# Patient Record
Sex: MALE | Race: WHITE | Employment: UNEMPLOYED | URBAN - METROPOLITAN AREA
[De-identification: names, ages, dates, MRNs, and addresses within clinical notes are randomized per-mention and may not be internally consistent; named-entity substitution may affect disease eponyms.]

---

## 2019-04-30 ENCOUNTER — HOSPITAL ENCOUNTER (OUTPATIENT)
Dept: LAB | Age: 54
Discharge: HOME | End: 2019-04-30
Payer: MEDICAID

## 2019-04-30 LAB — GLUCOSE SERPL-MCNC: 491 MG/DL (ref 65–100)

## 2019-04-30 PROCEDURE — 36415 COLL VENOUS BLD VENIPUNCTURE: CPT

## 2019-04-30 PROCEDURE — 82947 ASSAY GLUCOSE BLOOD QUANT: CPT

## 2021-06-10 ENCOUNTER — APPOINTMENT (OUTPATIENT)
Dept: GENERAL RADIOLOGY | Age: 56
DRG: 143 | End: 2021-06-10
Attending: EMERGENCY MEDICINE
Payer: COMMERCIAL

## 2021-06-10 ENCOUNTER — APPOINTMENT (OUTPATIENT)
Dept: CT IMAGING | Age: 56
DRG: 143 | End: 2021-06-10
Attending: EMERGENCY MEDICINE
Payer: COMMERCIAL

## 2021-06-10 ENCOUNTER — HOSPITAL ENCOUNTER (INPATIENT)
Age: 56
LOS: 8 days | Discharge: LEFT BEFORE DISCHARGE | DRG: 143 | End: 2021-06-18
Attending: EMERGENCY MEDICINE | Admitting: HOSPITALIST
Payer: COMMERCIAL

## 2021-06-10 DIAGNOSIS — I10 ESSENTIAL HYPERTENSION: Chronic | ICD-10-CM

## 2021-06-10 DIAGNOSIS — I47.20 V TACH: Chronic | ICD-10-CM

## 2021-06-10 DIAGNOSIS — J98.2 PNEUMOMEDIASTINUM (HCC): Primary | ICD-10-CM

## 2021-06-10 DIAGNOSIS — F19.10 SUBSTANCE ABUSE (HCC): Chronic | ICD-10-CM

## 2021-06-10 DIAGNOSIS — S11.021A: ICD-10-CM

## 2021-06-10 DIAGNOSIS — I50.22 SYSTOLIC CHF, CHRONIC (HCC): ICD-10-CM

## 2021-06-10 DIAGNOSIS — J44.1 COPD EXACERBATION (HCC): ICD-10-CM

## 2021-06-10 DIAGNOSIS — E11.65 UNCONTROLLED TYPE 2 DIABETES MELLITUS WITH HYPERGLYCEMIA (HCC): Chronic | ICD-10-CM

## 2021-06-10 DIAGNOSIS — Z72.0 TOBACCO ABUSE: Chronic | ICD-10-CM

## 2021-06-10 DIAGNOSIS — R73.9 HYPERGLYCEMIA: ICD-10-CM

## 2021-06-10 DIAGNOSIS — S11.021D: ICD-10-CM

## 2021-06-10 DIAGNOSIS — E87.1 ACUTE HYPONATREMIA: ICD-10-CM

## 2021-06-10 DIAGNOSIS — F15.10 METHAMPHETAMINE USE (HCC): Chronic | ICD-10-CM

## 2021-06-10 DIAGNOSIS — R09.02 HYPOXIA: ICD-10-CM

## 2021-06-10 PROBLEM — E86.0 DEHYDRATION: Status: ACTIVE | Noted: 2021-06-10

## 2021-06-10 LAB
ALBUMIN SERPL-MCNC: 2.2 G/DL (ref 3.5–5)
ALBUMIN/GLOB SERPL: 0.3 {RATIO} (ref 1.2–3.5)
ALP SERPL-CCNC: 184 U/L (ref 50–136)
ALT SERPL-CCNC: 23 U/L (ref 12–65)
ANION GAP SERPL CALC-SCNC: 9 MMOL/L (ref 7–16)
AST SERPL-CCNC: 120 U/L (ref 15–37)
ATRIAL RATE: 89 BPM
ATRIAL RATE: 96 BPM
BASOPHILS # BLD: 0.1 K/UL (ref 0–0.2)
BASOPHILS NFR BLD: 1 % (ref 0–2)
BILIRUB SERPL-MCNC: 1.3 MG/DL (ref 0.2–1.1)
BUN SERPL-MCNC: 31 MG/DL (ref 6–23)
CALCIUM SERPL-MCNC: 9.3 MG/DL (ref 8.3–10.4)
CALCULATED P AXIS, ECG09: 82 DEGREES
CALCULATED P AXIS, ECG09: 86 DEGREES
CALCULATED R AXIS, ECG10: -54 DEGREES
CALCULATED R AXIS, ECG10: -64 DEGREES
CALCULATED T AXIS, ECG11: 101 DEGREES
CALCULATED T AXIS, ECG11: 105 DEGREES
CHLORIDE SERPL-SCNC: 80 MMOL/L (ref 98–107)
CO2 SERPL-SCNC: 32 MMOL/L (ref 21–32)
CREAT SERPL-MCNC: 0.97 MG/DL (ref 0.8–1.5)
DIAGNOSIS, 93000: NORMAL
DIAGNOSIS, 93000: NORMAL
DIFFERENTIAL METHOD BLD: ABNORMAL
EOSINOPHIL # BLD: 0.1 K/UL (ref 0–0.8)
EOSINOPHIL NFR BLD: 0 % (ref 0.5–7.8)
ERYTHROCYTE [DISTWIDTH] IN BLOOD BY AUTOMATED COUNT: 13.9 % (ref 11.9–14.6)
GLOBULIN SER CALC-MCNC: 6.8 G/DL (ref 2.3–3.5)
GLUCOSE SERPL-MCNC: 464 MG/DL (ref 65–100)
HCT VFR BLD AUTO: 54.4 % (ref 41.1–50.3)
HGB BLD-MCNC: 18.4 G/DL (ref 13.6–17.2)
IMM GRANULOCYTES # BLD AUTO: 0 K/UL (ref 0–0.5)
IMM GRANULOCYTES NFR BLD AUTO: 0 % (ref 0–5)
LACTATE SERPL-SCNC: 2.6 MMOL/L (ref 0.4–2)
LYMPHOCYTES # BLD: 1.7 K/UL (ref 0.5–4.6)
LYMPHOCYTES NFR BLD: 15 % (ref 13–44)
MAGNESIUM SERPL-MCNC: 2.2 MG/DL (ref 1.8–2.4)
MCH RBC QN AUTO: 26.7 PG (ref 26.1–32.9)
MCHC RBC AUTO-ENTMCNC: 33.8 G/DL (ref 31.4–35)
MCV RBC AUTO: 79 FL (ref 79.6–97.8)
MONOCYTES # BLD: 0.9 K/UL (ref 0.1–1.3)
MONOCYTES NFR BLD: 8 % (ref 4–12)
NEUTS SEG # BLD: 8.9 K/UL (ref 1.7–8.2)
NEUTS SEG NFR BLD: 76 % (ref 43–78)
NRBC # BLD: 0 K/UL (ref 0–0.2)
P-R INTERVAL, ECG05: 196 MS
P-R INTERVAL, ECG05: 198 MS
PLATELET # BLD AUTO: 384 K/UL (ref 150–450)
PMV BLD AUTO: 10.8 FL (ref 9.4–12.3)
POTASSIUM SERPL-SCNC: 4.5 MMOL/L (ref 3.5–5.1)
POTASSIUM SERPL-SCNC: 7.7 MMOL/L (ref 3.5–5.1)
PROT SERPL-MCNC: 9 G/DL (ref 6.3–8.2)
Q-T INTERVAL, ECG07: 410 MS
Q-T INTERVAL, ECG07: 450 MS
QRS DURATION, ECG06: 150 MS
QRS DURATION, ECG06: 152 MS
QTC CALCULATION (BEZET), ECG08: 517 MS
QTC CALCULATION (BEZET), ECG08: 547 MS
RBC # BLD AUTO: 6.89 M/UL (ref 4.23–5.6)
SODIUM SERPL-SCNC: 121 MMOL/L (ref 136–145)
TROPONIN-HIGH SENSITIVITY: 77.5 PG/ML (ref 0–14)
TROPONIN-HIGH SENSITIVITY: 84.7 PG/ML (ref 0–14)
VENTRICULAR RATE, ECG03: 89 BPM
VENTRICULAR RATE, ECG03: 96 BPM
WBC # BLD AUTO: 11.6 K/UL (ref 4.3–11.1)

## 2021-06-10 PROCEDURE — 74011250636 HC RX REV CODE- 250/636: Performed by: EMERGENCY MEDICINE

## 2021-06-10 PROCEDURE — 81003 URINALYSIS AUTO W/O SCOPE: CPT

## 2021-06-10 PROCEDURE — 71260 CT THORAX DX C+: CPT

## 2021-06-10 PROCEDURE — 74011000258 HC RX REV CODE- 258: Performed by: EMERGENCY MEDICINE

## 2021-06-10 PROCEDURE — 93005 ELECTROCARDIOGRAM TRACING: CPT | Performed by: EMERGENCY MEDICINE

## 2021-06-10 PROCEDURE — 87040 BLOOD CULTURE FOR BACTERIA: CPT

## 2021-06-10 PROCEDURE — 85025 COMPLETE CBC W/AUTO DIFF WBC: CPT

## 2021-06-10 PROCEDURE — 99285 EMERGENCY DEPT VISIT HI MDM: CPT

## 2021-06-10 PROCEDURE — 84132 ASSAY OF SERUM POTASSIUM: CPT

## 2021-06-10 PROCEDURE — 96365 THER/PROPH/DIAG IV INF INIT: CPT

## 2021-06-10 PROCEDURE — 71045 X-RAY EXAM CHEST 1 VIEW: CPT

## 2021-06-10 PROCEDURE — 80053 COMPREHEN METABOLIC PANEL: CPT

## 2021-06-10 PROCEDURE — 65660000000 HC RM CCU STEPDOWN

## 2021-06-10 PROCEDURE — 96361 HYDRATE IV INFUSION ADD-ON: CPT

## 2021-06-10 PROCEDURE — 74011000636 HC RX REV CODE- 636: Performed by: EMERGENCY MEDICINE

## 2021-06-10 PROCEDURE — 83605 ASSAY OF LACTIC ACID: CPT

## 2021-06-10 PROCEDURE — 84484 ASSAY OF TROPONIN QUANT: CPT

## 2021-06-10 PROCEDURE — 83735 ASSAY OF MAGNESIUM: CPT

## 2021-06-10 RX ORDER — SODIUM CHLORIDE 0.9 % (FLUSH) 0.9 %
5-10 SYRINGE (ML) INJECTION AS NEEDED
Status: DISCONTINUED | OUTPATIENT
Start: 2021-06-10 | End: 2021-06-18 | Stop reason: HOSPADM

## 2021-06-10 RX ORDER — SODIUM CHLORIDE 0.9 % (FLUSH) 0.9 %
10 SYRINGE (ML) INJECTION
Status: COMPLETED | OUTPATIENT
Start: 2021-06-10 | End: 2021-06-10

## 2021-06-10 RX ORDER — LISINOPRIL 10 MG/1
10 TABLET ORAL DAILY
COMMUNITY

## 2021-06-10 RX ORDER — INSULIN GLARGINE 100 [IU]/ML
25 INJECTION, SOLUTION SUBCUTANEOUS
COMMUNITY

## 2021-06-10 RX ORDER — METOPROLOL TARTRATE 25 MG/1
25 TABLET, FILM COATED ORAL 2 TIMES DAILY
COMMUNITY
End: 2021-06-18

## 2021-06-10 RX ORDER — ASPIRIN 81 MG/1
81 TABLET ORAL DAILY
COMMUNITY

## 2021-06-10 RX ORDER — SODIUM CHLORIDE 0.9 % (FLUSH) 0.9 %
5-10 SYRINGE (ML) INJECTION EVERY 8 HOURS
Status: DISCONTINUED | OUTPATIENT
Start: 2021-06-10 | End: 2021-06-18 | Stop reason: HOSPADM

## 2021-06-10 RX ADMIN — Medication 10 ML: at 20:53

## 2021-06-10 RX ADMIN — PIPERACILLIN SODIUM AND TAZOBACTAM SODIUM 4.5 G: 4; .5 INJECTION, POWDER, LYOPHILIZED, FOR SOLUTION INTRAVENOUS at 21:32

## 2021-06-10 RX ADMIN — SODIUM CHLORIDE 100 ML: 900 INJECTION, SOLUTION INTRAVENOUS at 20:53

## 2021-06-10 RX ADMIN — IOPAMIDOL 100 ML: 755 INJECTION, SOLUTION INTRAVENOUS at 20:53

## 2021-06-10 RX ADMIN — SODIUM CHLORIDE 1000 ML: 900 INJECTION, SOLUTION INTRAVENOUS at 17:39

## 2021-06-10 NOTE — ED TRIAGE NOTES
Arrives via EMS from home. Complains of SOB beginning 2 days ago. States productive cough. 88% on room air.  Pt placed on 2L supp O2

## 2021-06-10 NOTE — ED PROVIDER NOTES
48-year-old white male history of hypertension and diabetes and heavy tobacco use presents with 3 days of cough and shortness of breath. Cough is productive of a yellow nonbloody sputum. Has had subjective fever. No chest pain. No leg pain or swelling. The history is provided by the patient. Shortness of Breath  Associated symptoms include a fever and cough. Pertinent negatives include no headaches, no neck pain, no chest pain, no vomiting, no abdominal pain and no rash. No past medical history on file. No past surgical history on file. No family history on file. Social History     Socioeconomic History    Marital status: SINGLE     Spouse name: Not on file    Number of children: Not on file    Years of education: Not on file    Highest education level: Not on file   Occupational History    Not on file   Tobacco Use    Smoking status: Not on file   Substance and Sexual Activity    Alcohol use: Not on file    Drug use: Not on file    Sexual activity: Not on file   Other Topics Concern    Not on file   Social History Narrative    Not on file     Social Determinants of Health     Financial Resource Strain:     Difficulty of Paying Living Expenses:    Food Insecurity:     Worried About Running Out of Food in the Last Year:     [address] in the Last Year:    Transportation Needs:     Lack of Transportation (Medical):      Lack of Transportation (Non-Medical):    Physical Activity:     Days of Exercise per Week:     Minutes of Exercise per Session:    Stress:     Feeling of Stress :    Social Connections:     Frequency of Communication with Friends and Family:     Frequency of Social Gatherings with Friends and Family:     Attends Church Services:     Active Member of Clubs or Organizations:     Attends Club or Organization Meetings:     Marital Status:    Intimate Partner Violence:     Fear of Current or Ex-Partner:     Emotionally Abused:     Physically Abused:     Sexually Abused: ALLERGIES: Patient has no known allergies. Review of Systems   Constitutional: Positive for chills and fever. HENT: Positive for congestion. Respiratory: Positive for cough and shortness of breath. Cardiovascular: Negative for chest pain. Gastrointestinal: Negative for abdominal pain, nausea and vomiting. Genitourinary: Negative for dysuria. Musculoskeletal: Negative for back pain and neck pain. Skin: Negative for rash. Neurological: Negative for headaches. Vitals:    06/10/21 1715 06/10/21 1720   BP: 128/80    Pulse: 96    Resp: 24    Temp: 97.9 °F (36.6 °C)    SpO2: (!) 88% 97%            Physical Exam  Vitals and nursing note reviewed. Constitutional:       General: He is not in acute distress. Appearance: Normal appearance. He is not toxic-appearing. HENT:      Head: Normocephalic and atraumatic. Nose: Nose normal.      Mouth/Throat:      Mouth: Mucous membranes are moist.      Pharynx: Oropharynx is clear. Eyes:      Conjunctiva/sclera: Conjunctivae normal.      Pupils: Pupils are equal, round, and reactive to light. Cardiovascular:      Rate and Rhythm: Normal rate and regular rhythm. Pulmonary:      Comments: Appears dyspneic however lungs are clear. Abdominal:      General: There is no distension. Palpations: Abdomen is soft. Tenderness: There is no abdominal tenderness. Musculoskeletal:         General: No swelling. Normal range of motion. Cervical back: Normal range of motion. Skin:     General: Skin is warm and dry. Neurological:      Mental Status: He is alert and oriented to person, place, and time. Psychiatric:         Mood and Affect: Mood normal.         Behavior: Behavior normal.          MDM  Number of Diagnoses or Management Options  Diagnosis management comments: EKG shows sinus rhythm with occasional PVC, nonspecific interventricular block some ST elevation in V3 V4.   Chest x-ray no acute abnormality. Blood work shows leukocytosis 11.6, hyperglycemia 464 without DKA, initial potassium 7.7 with hemolysis. Repeated 4.5. Troponin 84 on repeat 77. Due to patient's hypoxia on arrival he underwent chest CT which shows no evidence of PE. He does have a pneumomediastinum believed to be due to a rupture of the posterior tracheal wall. No other acute abnormality. Bilateral lower lobe bronchi obstructed by mucus impaction.        Amount and/or Complexity of Data Reviewed  Clinical lab tests: ordered and reviewed  Tests in the radiology section of CPT®: ordered and reviewed  Tests in the medicine section of CPT®: ordered and reviewed  Discuss the patient with other providers: yes  Independent visualization of images, tracings, or specimens: yes    Risk of Complications, Morbidity, and/or Mortality  Presenting problems: high  Diagnostic procedures: high  Management options: high           Procedures

## 2021-06-11 PROBLEM — Z72.0 TOBACCO ABUSE: Status: ACTIVE | Noted: 2018-03-31

## 2021-06-11 PROBLEM — J38.00 VOCAL CORD PARALYSIS: Status: ACTIVE | Noted: 2021-03-17

## 2021-06-11 PROBLEM — I10 ESSENTIAL HYPERTENSION: Status: ACTIVE | Noted: 2018-04-09

## 2021-06-11 PROBLEM — J44.1 COPD EXACERBATION (HCC): Status: ACTIVE | Noted: 2021-06-11

## 2021-06-11 PROBLEM — F15.10 METHAMPHETAMINE USE (HCC): Status: ACTIVE | Noted: 2018-03-31

## 2021-06-11 PROBLEM — E11.9 TYPE 2 DIABETES MELLITUS WITHOUT COMPLICATION, WITHOUT LONG-TERM CURRENT USE OF INSULIN (HCC): Status: ACTIVE | Noted: 2018-03-31

## 2021-06-11 LAB
AMPHET UR QL SCN: POSITIVE
ANION GAP SERPL CALC-SCNC: 10 MMOL/L (ref 7–16)
ANION GAP SERPL CALC-SCNC: 7 MMOL/L (ref 7–16)
BARBITURATES UR QL SCN: NEGATIVE
BENZODIAZ UR QL: NEGATIVE
BUN SERPL-MCNC: 24 MG/DL (ref 6–23)
BUN SERPL-MCNC: 29 MG/DL (ref 6–23)
CALCIUM SERPL-MCNC: 8.4 MG/DL (ref 8.3–10.4)
CALCIUM SERPL-MCNC: 8.5 MG/DL (ref 8.3–10.4)
CANNABINOIDS UR QL SCN: NEGATIVE
CHLORIDE SERPL-SCNC: 90 MMOL/L (ref 98–107)
CHLORIDE SERPL-SCNC: 93 MMOL/L (ref 98–107)
CO2 SERPL-SCNC: 28 MMOL/L (ref 21–32)
CO2 SERPL-SCNC: 32 MMOL/L (ref 21–32)
COCAINE UR QL SCN: NEGATIVE
CREAT SERPL-MCNC: 0.61 MG/DL (ref 0.8–1.5)
CREAT SERPL-MCNC: 0.73 MG/DL (ref 0.8–1.5)
ERYTHROCYTE [DISTWIDTH] IN BLOOD BY AUTOMATED COUNT: 12.9 % (ref 11.9–14.6)
GLUCOSE BLD STRIP.AUTO-MCNC: 267 MG/DL (ref 65–100)
GLUCOSE BLD STRIP.AUTO-MCNC: 340 MG/DL (ref 65–100)
GLUCOSE BLD STRIP.AUTO-MCNC: 345 MG/DL (ref 65–100)
GLUCOSE BLD STRIP.AUTO-MCNC: 392 MG/DL (ref 65–100)
GLUCOSE BLD STRIP.AUTO-MCNC: 514 MG/DL (ref 65–100)
GLUCOSE SERPL-MCNC: 348 MG/DL (ref 65–100)
GLUCOSE SERPL-MCNC: 393 MG/DL (ref 65–100)
HCT VFR BLD AUTO: 46.8 % (ref 41.1–50.3)
HGB BLD-MCNC: 15.8 G/DL (ref 13.6–17.2)
LACTATE SERPL-SCNC: 2 MMOL/L (ref 0.4–2)
LACTATE SERPL-SCNC: 2.9 MMOL/L (ref 0.4–2)
MAGNESIUM SERPL-MCNC: 2.1 MG/DL (ref 1.8–2.4)
MAGNESIUM SERPL-MCNC: 2.2 MG/DL (ref 1.8–2.4)
MCH RBC QN AUTO: 26.4 PG (ref 26.1–32.9)
MCHC RBC AUTO-ENTMCNC: 33.8 G/DL (ref 31.4–35)
MCV RBC AUTO: 78.3 FL (ref 79.6–97.8)
METHADONE UR QL: NEGATIVE
NRBC # BLD: 0 K/UL (ref 0–0.2)
OPIATES UR QL: NEGATIVE
PCP UR QL: NEGATIVE
PLATELET # BLD AUTO: 301 K/UL (ref 150–450)
PMV BLD AUTO: 10.1 FL (ref 9.4–12.3)
POTASSIUM SERPL-SCNC: 4.1 MMOL/L (ref 3.5–5.1)
POTASSIUM SERPL-SCNC: 4.4 MMOL/L (ref 3.5–5.1)
RBC # BLD AUTO: 5.98 M/UL (ref 4.23–5.6)
SERVICE CMNT-IMP: ABNORMAL
SODIUM SERPL-SCNC: 128 MMOL/L (ref 138–145)
SODIUM SERPL-SCNC: 132 MMOL/L (ref 136–145)
WBC # BLD AUTO: 16.1 K/UL (ref 4.3–11.1)

## 2021-06-11 PROCEDURE — 80307 DRUG TEST PRSMV CHEM ANLYZR: CPT

## 2021-06-11 PROCEDURE — 99223 1ST HOSP IP/OBS HIGH 75: CPT | Performed by: INTERNAL MEDICINE

## 2021-06-11 PROCEDURE — 74011636637 HC RX REV CODE- 636/637: Performed by: HOSPITALIST

## 2021-06-11 PROCEDURE — 94640 AIRWAY INHALATION TREATMENT: CPT

## 2021-06-11 PROCEDURE — 74011000250 HC RX REV CODE- 250: Performed by: NURSE PRACTITIONER

## 2021-06-11 PROCEDURE — 36415 COLL VENOUS BLD VENIPUNCTURE: CPT

## 2021-06-11 PROCEDURE — 80048 BASIC METABOLIC PNL TOTAL CA: CPT

## 2021-06-11 PROCEDURE — 82962 GLUCOSE BLOOD TEST: CPT

## 2021-06-11 PROCEDURE — 83735 ASSAY OF MAGNESIUM: CPT

## 2021-06-11 PROCEDURE — 74011250637 HC RX REV CODE- 250/637: Performed by: NURSE PRACTITIONER

## 2021-06-11 PROCEDURE — 83605 ASSAY OF LACTIC ACID: CPT

## 2021-06-11 PROCEDURE — 74011000250 HC RX REV CODE- 250: Performed by: INTERNAL MEDICINE

## 2021-06-11 PROCEDURE — 74011000258 HC RX REV CODE- 258: Performed by: HOSPITALIST

## 2021-06-11 PROCEDURE — 85027 COMPLETE CBC AUTOMATED: CPT

## 2021-06-11 PROCEDURE — 74011250637 HC RX REV CODE- 250/637: Performed by: HOSPITALIST

## 2021-06-11 PROCEDURE — 65660000000 HC RM CCU STEPDOWN

## 2021-06-11 PROCEDURE — 77010033678 HC OXYGEN DAILY

## 2021-06-11 PROCEDURE — 74011250636 HC RX REV CODE- 250/636: Performed by: INTERNAL MEDICINE

## 2021-06-11 PROCEDURE — 74011250636 HC RX REV CODE- 250/636: Performed by: HOSPITALIST

## 2021-06-11 PROCEDURE — 94760 N-INVAS EAR/PLS OXIMETRY 1: CPT

## 2021-06-11 PROCEDURE — 94664 DEMO&/EVAL PT USE INHALER: CPT

## 2021-06-11 RX ORDER — BUDESONIDE 0.5 MG/2ML
500 INHALANT ORAL
Status: DISCONTINUED | OUTPATIENT
Start: 2021-06-11 | End: 2021-06-18 | Stop reason: HOSPADM

## 2021-06-11 RX ORDER — INSULIN GLARGINE 100 [IU]/ML
35 INJECTION, SOLUTION SUBCUTANEOUS
Status: DISCONTINUED | OUTPATIENT
Start: 2021-06-11 | End: 2021-06-12

## 2021-06-11 RX ORDER — ALBUTEROL SULFATE 0.83 MG/ML
2.5 SOLUTION RESPIRATORY (INHALATION)
Status: DISCONTINUED | OUTPATIENT
Start: 2021-06-11 | End: 2021-06-14

## 2021-06-11 RX ORDER — ACETAMINOPHEN 325 MG/1
650 TABLET ORAL
Status: DISCONTINUED | OUTPATIENT
Start: 2021-06-11 | End: 2021-06-18 | Stop reason: HOSPADM

## 2021-06-11 RX ORDER — POLYETHYLENE GLYCOL 3350 17 G/17G
17 POWDER, FOR SOLUTION ORAL DAILY PRN
Status: DISCONTINUED | OUTPATIENT
Start: 2021-06-11 | End: 2021-06-18 | Stop reason: HOSPADM

## 2021-06-11 RX ORDER — ACETAMINOPHEN 650 MG/1
650 SUPPOSITORY RECTAL
Status: DISCONTINUED | OUTPATIENT
Start: 2021-06-11 | End: 2021-06-18 | Stop reason: HOSPADM

## 2021-06-11 RX ORDER — SODIUM CHLORIDE 9 MG/ML
50 INJECTION, SOLUTION INTRAVENOUS CONTINUOUS
Status: DISCONTINUED | OUTPATIENT
Start: 2021-06-11 | End: 2021-06-12

## 2021-06-11 RX ORDER — LORAZEPAM 2 MG/ML
1 INJECTION INTRAMUSCULAR
Status: DISCONTINUED | OUTPATIENT
Start: 2021-06-11 | End: 2021-06-18 | Stop reason: HOSPADM

## 2021-06-11 RX ORDER — ASPIRIN 81 MG/1
81 TABLET ORAL DAILY
Status: DISCONTINUED | OUTPATIENT
Start: 2021-06-11 | End: 2021-06-18 | Stop reason: HOSPADM

## 2021-06-11 RX ORDER — ONDANSETRON 4 MG/1
4 TABLET, ORALLY DISINTEGRATING ORAL
Status: DISCONTINUED | OUTPATIENT
Start: 2021-06-11 | End: 2021-06-18 | Stop reason: HOSPADM

## 2021-06-11 RX ORDER — IPRATROPIUM BROMIDE AND ALBUTEROL SULFATE 2.5; .5 MG/3ML; MG/3ML
3 SOLUTION RESPIRATORY (INHALATION)
Status: DISCONTINUED | OUTPATIENT
Start: 2021-06-11 | End: 2021-06-11

## 2021-06-11 RX ORDER — INSULIN GLARGINE 100 [IU]/ML
20 INJECTION, SOLUTION SUBCUTANEOUS
Status: DISCONTINUED | OUTPATIENT
Start: 2021-06-11 | End: 2021-06-11

## 2021-06-11 RX ORDER — INSULIN LISPRO 100 [IU]/ML
INJECTION, SOLUTION INTRAVENOUS; SUBCUTANEOUS
Status: DISCONTINUED | OUTPATIENT
Start: 2021-06-11 | End: 2021-06-11 | Stop reason: SDUPTHER

## 2021-06-11 RX ORDER — INSULIN LISPRO 100 [IU]/ML
INJECTION, SOLUTION INTRAVENOUS; SUBCUTANEOUS
Status: DISCONTINUED | OUTPATIENT
Start: 2021-06-11 | End: 2021-06-18 | Stop reason: HOSPADM

## 2021-06-11 RX ORDER — INSULIN GLARGINE 100 [IU]/ML
35 INJECTION, SOLUTION SUBCUTANEOUS
Status: DISCONTINUED | OUTPATIENT
Start: 2021-06-11 | End: 2021-06-11

## 2021-06-11 RX ORDER — IBUPROFEN 200 MG
1 TABLET ORAL EVERY 24 HOURS
Status: DISCONTINUED | OUTPATIENT
Start: 2021-06-11 | End: 2021-06-18 | Stop reason: HOSPADM

## 2021-06-11 RX ORDER — ONDANSETRON 2 MG/ML
4 INJECTION INTRAMUSCULAR; INTRAVENOUS
Status: DISCONTINUED | OUTPATIENT
Start: 2021-06-11 | End: 2021-06-18 | Stop reason: HOSPADM

## 2021-06-11 RX ORDER — ALBUTEROL SULFATE 0.83 MG/ML
2.5 SOLUTION RESPIRATORY (INHALATION)
Status: DISCONTINUED | OUTPATIENT
Start: 2021-06-11 | End: 2021-06-18 | Stop reason: HOSPADM

## 2021-06-11 RX ORDER — METOPROLOL TARTRATE 25 MG/1
25 TABLET, FILM COATED ORAL EVERY 12 HOURS
Status: DISCONTINUED | OUTPATIENT
Start: 2021-06-11 | End: 2021-06-15

## 2021-06-11 RX ADMIN — METHYLPREDNISOLONE SODIUM SUCCINATE 60 MG: 40 INJECTION, POWDER, FOR SOLUTION INTRAMUSCULAR; INTRAVENOUS at 17:08

## 2021-06-11 RX ADMIN — INSULIN GLARGINE 35 UNITS: 100 INJECTION, SOLUTION SUBCUTANEOUS at 21:45

## 2021-06-11 RX ADMIN — PIPERACILLIN SODIUM AND TAZOBACTAM SODIUM 3.38 G: 3; .375 INJECTION, POWDER, LYOPHILIZED, FOR SOLUTION INTRAVENOUS at 23:10

## 2021-06-11 RX ADMIN — ALBUTEROL SULFATE 2.5 MG: 2.5 SOLUTION RESPIRATORY (INHALATION) at 20:55

## 2021-06-11 RX ADMIN — PIPERACILLIN SODIUM AND TAZOBACTAM SODIUM 3.38 G: 3; .375 INJECTION, POWDER, LYOPHILIZED, FOR SOLUTION INTRAVENOUS at 15:24

## 2021-06-11 RX ADMIN — ALBUTEROL SULFATE 2.5 MG: 2.5 SOLUTION RESPIRATORY (INHALATION) at 15:51

## 2021-06-11 RX ADMIN — INSULIN GLARGINE 35 UNITS: 100 INJECTION, SOLUTION SUBCUTANEOUS at 01:32

## 2021-06-11 RX ADMIN — PIPERACILLIN SODIUM AND TAZOBACTAM SODIUM 3.38 G: 3; .375 INJECTION, POWDER, LYOPHILIZED, FOR SOLUTION INTRAVENOUS at 05:56

## 2021-06-11 RX ADMIN — BUDESONIDE 500 MCG: 0.5 INHALANT RESPIRATORY (INHALATION) at 10:56

## 2021-06-11 RX ADMIN — ASPIRIN 81 MG: 81 TABLET ORAL at 11:31

## 2021-06-11 RX ADMIN — SODIUM CHLORIDE 5 ML: 9 INJECTION, SOLUTION INTRAMUSCULAR; INTRAVENOUS; SUBCUTANEOUS at 00:54

## 2021-06-11 RX ADMIN — SODIUM CHLORIDE 10 ML: 9 INJECTION, SOLUTION INTRAMUSCULAR; INTRAVENOUS; SUBCUTANEOUS at 21:56

## 2021-06-11 RX ADMIN — SODIUM CHLORIDE 50 ML/HR: 900 INJECTION, SOLUTION INTRAVENOUS at 00:51

## 2021-06-11 RX ADMIN — SODIUM CHLORIDE 10 ML: 9 INJECTION, SOLUTION INTRAMUSCULAR; INTRAVENOUS; SUBCUTANEOUS at 06:02

## 2021-06-11 RX ADMIN — METOPROLOL TARTRATE 25 MG: 25 TABLET, FILM COATED ORAL at 11:31

## 2021-06-11 RX ADMIN — INSULIN LISPRO 12 UNITS: 100 INJECTION, SOLUTION INTRAVENOUS; SUBCUTANEOUS at 17:08

## 2021-06-11 RX ADMIN — ALBUTEROL SULFATE 2.5 MG: 2.5 SOLUTION RESPIRATORY (INHALATION) at 10:56

## 2021-06-11 RX ADMIN — METOPROLOL TARTRATE 25 MG: 25 TABLET, FILM COATED ORAL at 21:55

## 2021-06-11 RX ADMIN — INSULIN LISPRO 9 UNITS: 100 INJECTION, SOLUTION INTRAVENOUS; SUBCUTANEOUS at 11:36

## 2021-06-11 RX ADMIN — INSULIN LISPRO 15 UNITS: 100 INJECTION, SOLUTION INTRAVENOUS; SUBCUTANEOUS at 05:56

## 2021-06-11 RX ADMIN — INSULIN LISPRO 12 UNITS: 100 INJECTION, SOLUTION INTRAVENOUS; SUBCUTANEOUS at 21:45

## 2021-06-11 RX ADMIN — BUDESONIDE 500 MCG: 0.5 INHALANT RESPIRATORY (INHALATION) at 20:55

## 2021-06-11 RX ADMIN — METHYLPREDNISOLONE SODIUM SUCCINATE 60 MG: 40 INJECTION, POWDER, FOR SOLUTION INTRAMUSCULAR; INTRAVENOUS at 11:31

## 2021-06-11 RX ADMIN — SODIUM CHLORIDE 5 ML: 9 INJECTION, SOLUTION INTRAMUSCULAR; INTRAVENOUS; SUBCUTANEOUS at 11:32

## 2021-06-11 NOTE — H&P (VIEW-ONLY)
CONSULT NOTE Marisol Guevara 6/11/2021 Date of Admission:  6/10/2021 The patient's chart is reviewed and the patient is discussed with the staff. Subjective:  
 
Patient is a 63 y.o.  male, PMH DM, HTN, tobacco abuse, methamphetamine abuse, Hep C positive in 2018, and cervical disc disorder, seen and evaluated at the request of Dr. Ramos June for pneumomediastinum. The patient presented to the ED here on yesterday via EMS with c/o productive cough w/ yellow sputum and shortness of breath X 3 days. O2 sat on RA was noted to be 88%. CT Chest was performed to r/o PE which revealed no PE but did indicate pneumomediastinum and obstruction of the lower lobar bronchi, likely mucus impaction. WBC has went from 11.6 last night to 16.1 this morning. Serum glucose have ranged from high 300s to high 400s, and Lactic acid on admission was 2.9. Blood cultures X 2 are pending. The patient was started on Zosyn and is on O2 at 3L NC. The patient is very lethargic this morning and unable to stay awake long enough to answer pertinent questions. He does endorse coughing and being hoarse. The patient was admitted to [facility] in March of this year after presenting to the ED at [facility] with c/o cough, dysphonia, and worsening dyspnea. ENT had [facility] was contacted and the patient was transferred there for ENT consultation for stridor. Laryngoscopy was performed and demonstrated nodular mass right vocal cord and thick whitish exudate overlying both vocal cords. Intubation was recommended for potential impending airway compromise. At discharge from that admission the patient was placed on Augmentin and Nystatin for vocal cord mass vs lagryngeal thrush. The patient was also noted to have heavy Haemophilus influenza growth in sputum culture. Echocardiogram during that admission also revealed EF 35-40% with septal hypokinesis and grade 1 diastolic dysfunction.   Hgb A1c was 15.0 at that time as well. Review of Systems A comprehensive review of systems was negative except for that written in the HPI. Patient Active Problem List  
Diagnosis Code  Pneumomediastinum ([facility] J98.2  Substance abuse ([facility] F19.10  Acute hyponatremia E87.1  Uncontrolled type II diabetes mellitus ([facility] E11.65  Dehydration E86.0  Type 2 diabetes mellitus without complication, without long-term current use of insulin (HCC) E11.9  Vocal cord paralysis J38.00  Tobacco abuse Z72.0  Methamphetamine use ([facility] F15.10  Essential hypertension [employer] none Prior to Admission Medications Prescriptions Last Dose Informant Patient Reported? Taking?  
aspirin delayed-release 81 mg tablet Not Taking at Unknown time  Yes No  
Sig: Take 81 mg by mouth daily. Patient not taking: Reported on 2021 insulin glargine (Lantus Solostar U-100 Insulin) 100 unit/mL (3 mL) inpn 2021  Yes Yes Si Units by SubCUTAneous route nightly. lisinopriL (PRINIVIL, ZESTRIL) 10 mg tablet 6/10/2021  Yes Yes Sig: Take 10 mg by mouth daily. metoprolol tartrate (LOPRESSOR) 25 mg tablet 6/10/2021  Yes Yes Sig: Take 25 mg by mouth two (2) times a day. Facility-Administered Medications: None Past Medical History:  
Diagnosis Date  Diabetes mellitus ([facility]  HCV (hepatitis C virus)  HTN (hypertension)  Substance abuse ([facility]  Vocal cord mass History reviewed. No pertinent surgical history. Social History Socioeconomic History  Marital status: SINGLE Spouse name: Not on file  Number of children: Not on file  Years of education: Not on file  Highest education level: Not on file Occupational History  Not on file Tobacco Use  Smoking status: Former Smoker Quit date: 2021 Years since quittin.0  Smokeless tobacco: Never Used Vaping Use  Vaping Use: Never used Substance and Sexual Activity  Alcohol use: Not Currently  Drug use: Not Currently  Sexual activity: Not on file Other Topics Concern   Service No  
 Blood Transfusions No  
 Caffeine Concern No  
 Occupational Exposure No  
 Hobby Hazards No  
 Sleep Concern No  
 Stress Concern No  
 Weight Concern No  
 Special Diet No  
 Back Care No  
 Exercise No  
 Bike Helmet No  
 Seat Belt No  
 Self-Exams No  
Social History Narrative  Not on file Social Determinants of Health Financial Resource Strain:  Difficulty of Paying Living Expenses:   
Food Insecurity:  Worried About [address] in the Last Year:   
[address] in the Last Year:   
Transportation Needs:   
 Lack of Transportation (Medical):  Lack of Transportation (Non-Medical): Physical Activity:   
 Days of Exercise per Week:  Minutes of Exercise per Session:   
Stress:  Feeling of Stress :   
Social Connections:  Frequency of Communication with Friends and Family:  Frequency of Social Gatherings with Friends and Family:  Attends Confucianist Services:  Active Member of Clubs or Organizations:  Attends Club or Organization Meetings:  Marital Status: Intimate Partner Violence:  Fear of Current or Ex-Partner:  Emotionally Abused:   
 Physically Abused:   
 Sexually Abused:   
 
History reviewed. No pertinent family history. No Known Allergies Current Facility-Administered Medications Medication Dose Route Frequency  acetaminophen (TYLENOL) tablet 650 mg  650 mg Oral Q6H PRN Or  
 acetaminophen (TYLENOL) suppository 650 mg  650 mg Rectal Q6H PRN  polyethylene glycol (MIRALAX) packet 17 g  17 g Oral DAILY PRN  
 ondansetron (ZOFRAN ODT) tablet 4 mg  4 mg Oral Q8H PRN Or  
 ondansetron (ZOFRAN) injection 4 mg  4 mg IntraVENous Q6H PRN  
 0.9% sodium chloride infusion  50 mL/hr IntraVENous CONTINUOUS  
 insulin lispro (HUMALOG) injection   SubCUTAneous AC&HS  piperacillin-tazobactam (ZOSYN) 3.375 g in 0.9% sodium chloride (MBP/ADV) 100 mL MBP  3.375 g IntraVENous Q8H  
 metoprolol tartrate (LOPRESSOR) tablet 25 mg  25 mg Oral Q12H  aspirin delayed-release tablet 81 mg  81 mg Oral DAILY  LORazepam (ATIVAN) injection 1 mg  1 mg IntraVENous Q6H PRN  
 insulin glargine (LANTUS) injection 35 Units  35 Units SubCUTAneous QHS  nicotine (NICODERM CQ) 21 mg/24 hr patch 1 Patch  1 Patch TransDERmal Q24H  
 albuterol (PROVENTIL VENTOLIN) nebulizer solution 2.5 mg  2.5 mg Nebulization Q4H PRN  
 budesonide (PULMICORT) 500 mcg/2 ml nebulizer suspension  500 mcg Nebulization BID RT  
 sodium chloride (NS) flush 5-10 mL  5-10 mL IntraVENous Q8H  
 sodium chloride (NS) flush 5-10 mL  5-10 mL IntraVENous PRN Objective:  
 
Vitals:  
 06/10/21 2314 06/11/21 3117 06/11/21 0347 06/11/21 0715 BP:  138/77 (!) 145/78 (!) 143/76 Pulse: 96 85 (!) 104 80 Resp:  25 26 28 Temp:  97.7 °F (36.5 °C) 98.1 °F (36.7 °C) 98 °F (36.7 °C) SpO2: 94% 94% 94% 95% Weight:  161 lb 3.2 oz (73.1 kg) 161 lb 12.8 oz (73.4 kg) Height:  5' 11\" (1.803 m) PHYSICAL EXAM  
 
Constitutional:  the patient is thin and in no acute distress EENMT:  Sclera clear, pupils equal, oral mucosa moist 
Respiratory: On 3L O2 NC, rhonchi especial in upper airways Cardiovascular:  RRR without M,G,R 
Gastrointestinal: soft and non-tender; with positive bowel sounds. Musculoskeletal: warm without cyanosis. There is no lower extremity edema. Skin:  no jaundice or rashes, no wounds Neurologic: no gross neuro deficits Psychiatric:  Lethargic and drowsy, rouses to voice CXR:  6/10/2021 CT Chest: 6/10/2021 IMPRESSION 1. No acute pulmonary embolus. 2. Defect or rupture of the posterior tracheal wall near the thoracic inlet with 
air extending into the upper mediastinal soft tissues indicative of 
pneumomediastinum. 3. No acute abnormality noted in the lungs.  
4. Nodular morphology to the liver can be seen with cirrhosis. 5. Obstruction of the bilateral lower lobar bronchi likely mucus impaction. Aspirated fluid is not excluded. ECHOCARDIOGRAM:  3/21/2021 Recent Labs  
  06/11/21 
0459 06/10/21 
1825 WBC 16.1* 11.6* HGB 15.8 18.4* HCT 46.8 54.4*  
 384 Recent Labs  
  06/11/21 
0459 06/10/21 
1951 06/10/21 
1825 *  --  121*  
K 4.4 4.5 7.7* CL 90*  --  80* *  --  464* CO2 28  --  32 BUN 29*  --  31* CREA 0.61*  --  0.97  
MG 2.1  --  2.2 CA 8.4  --  9.3 ALB  --   --  2.2* TBILI  --   --  1.3* ALT  --   --  23 No results for input(s): PH, PCO2, PO2, HCO3, PHI, PCO2I, PO2I, HCO3I in the last 72 hours. Recent Labs  
  06/11/21 
0459 06/10/21 
2313 06/10/21 
1825 LAC 2.0 2.9* 2.6* Cultures: 
Blood x2 Urine- 
Sputum- Wound- 
Pleural fluid Abdominal fluid- 
 
Inpat Anti-Infectives (From admission, onward) Start     Ordered Stop  
 06/11/21 0600  piperacillin-tazobactam (ZOSYN) 3.375 g in 0.9% sodium chloride (MBP/ADV) 100 mL MBP  3.375 g,   IntraVENous,   EVERY 8 HOURS    
 06/11/21 0031 -- Assessment:  (Medical Decision Making) Hospital Problems  Date Reviewed: 6/11/2021 Codes Class Noted POA * (Principal) Pneumomediastinum ([facility] ICD-10-CM: J98.2 ICD-9-CM: 518.1  6/10/2021 Unknown Substance abuse (HCC) (Chronic) ICD-10-CM: F19.10 ICD-9-CM: 305.90  6/10/2021 Unknown Acute hyponatremia ICD-10-CM: E87.1 ICD-9-CM: 276.1  6/10/2021 Unknown Uncontrolled type II diabetes mellitus (HCC) (Chronic) ICD-10-CM: E11.65 ICD-9-CM: 250.02  6/10/2021 Unknown Dehydration ICD-10-CM: E86.0 ICD-9-CM: 276.51  6/10/2021 Unknown Plan:  (Medical Decision Making) --On 3L O2 NC, wean as tolerated 
--Consider bronchoscopy, patient is NPO however patient's hyponatremia would need to be corrected first 
--Awaiting blood cultures, will check sputum culture and UDS, continue Zosyn 
--Change Duoneb to Albuterol, add Pulmicort 
--Add Nicoderm patch daily 
--GI/DVT prophylaxis 
--Full Code More than 50% of the time documented was spent in face-to-face contact with the patient and in the care of the patient on the floor/unit where the patient is located. Thank you very much for this referral.  We appreciate the opportunity to participate in this patient's care. Will follow along with above stated plan. Gregoria Fowler, NP Lungs:  Diffuse B exp wheeze, rhonchi Heart:  RRR with no Murmur/Rubs/Gallops Additional Comments:   
Patient presenting with what appears to be COPD exacerbation. Wheeze and rhonchi on exam. Will start solumedrol and add albuterol to pulmicort. Also looks to have mucus plugging of RLL. Already on zosyn. F/u sputum culture if can obtain one. Possibility of tracheal wall tear. These typically occur either due to injury (he has had none recently) or iatrogenically during surgery or intubation. He was intubated at [facility] in March so it is possible he sustained injury at that time. However, if it has been present this long, does not seem like an emergent problem. Will need to perform direct visual inspection but safest to do this when his COPD exacerbation has improved and with MAC anesthesia given his reported history of meth abuse. Will look to schedule this for Monday and reassess his respiratory status that day. Can eat for now and will make NPO Sunday night. I have spoken with and examined the patient. I agree with the above assessment and plan as documented.  
 
Payal Oconnell MD

## 2021-06-11 NOTE — ED NOTES
TRANSFER - IN REPORT:    Verbal report received from 210 Samantha Hooker RN(name) on Balaji Book  being received from 809(unit) for routine progression of care      Report consisted of patients Situation, Background, Assessment and   Recommendations(SBAR). Information from the following report(s) SBAR, ED Summary and Med Rec Status was reviewed with the receiving nurse. Opportunity for questions and clarification was provided. Assessment completed upon patients arrival to unit and care assumed.

## 2021-06-11 NOTE — CONSULTS
CONSULT NOTE    Ebenezer Darby    6/11/2021    Date of Admission:  6/10/2021    The patient's chart is reviewed and the patient is discussed with the staff. Subjective:     Patient is a 63 y.o.  male, PMH DM, HTN, tobacco abuse, methamphetamine abuse, Hep C positive in 2018, and cervical disc disorder, seen and evaluated at the request of Dr. Darleen Gonzalez for pneumomediastinum. The patient presented to the ED here on yesterday via EMS with c/o productive cough w/ yellow sputum and shortness of breath X 3 days. O2 sat on RA was noted to be 88%. CT Chest was performed to r/o PE which revealed no PE but did indicate pneumomediastinum and obstruction of the lower lobar bronchi, likely mucus impaction. WBC has went from 11.6 last night to 16.1 this morning. Serum glucose have ranged from high 300s to high 400s, and Lactic acid on admission was 2.9. Blood cultures X 2 are pending. The patient was started on Zosyn and is on O2 at 3L NC. The patient is very lethargic this morning and unable to stay awake long enough to answer pertinent questions. He does endorse coughing and being hoarse. The patient was admitted to [facility] in March of this year after presenting to the ED at [facility] with c/o cough, dysphonia, and worsening dyspnea. ENT had [facility] was contacted and the patient was transferred there for ENT consultation for stridor. Laryngoscopy was performed and demonstrated nodular mass right vocal cord and thick whitish exudate overlying both vocal cords. Intubation was recommended for potential impending airway compromise. At discharge from that admission the patient was placed on Augmentin and Nystatin for vocal cord mass vs lagryngeal thrush. The patient was also noted to have heavy Haemophilus influenza growth in sputum culture. Echocardiogram during that admission also revealed EF 35-40% with septal hypokinesis and grade 1 diastolic dysfunction.   Hgb A1c was 15.0 at that time as well. Review of Systems  A comprehensive review of systems was negative except for that written in the HPI. Patient Active Problem List   Diagnosis Code    Pneumomediastinum ([facility] J98.2    Substance abuse ([facility] F19.10    Acute hyponatremia E87.1    Uncontrolled type II diabetes mellitus ([facility] E11.65    Dehydration E86.0    Type 2 diabetes mellitus without complication, without long-term current use of insulin ([facility] E11.9    Vocal cord paralysis J38.00    Tobacco abuse Z72.0    Methamphetamine use ([facility] F15.10    Essential hypertension [facility] [employer] none    Prior to Admission Medications   Prescriptions Last Dose Informant Patient Reported? Taking?   aspirin delayed-release 81 mg tablet Not Taking at Unknown time  Yes No   Sig: Take 81 mg by mouth daily. Patient not taking: Reported on 2021   insulin glargine (Lantus Solostar U-100 Insulin) 100 unit/mL (3 mL) inpn 2021  Yes Yes   Si Units by SubCUTAneous route nightly. lisinopriL (PRINIVIL, ZESTRIL) 10 mg tablet 6/10/2021  Yes Yes   Sig: Take 10 mg by mouth daily. metoprolol tartrate (LOPRESSOR) 25 mg tablet 6/10/2021  Yes Yes   Sig: Take 25 mg by mouth two (2) times a day. Facility-Administered Medications: None       Past Medical History:   Diagnosis Date    Diabetes mellitus ([facility]     HCV (hepatitis C virus)     HTN (hypertension)     Substance abuse ([facility]     Vocal cord mass      History reviewed. No pertinent surgical history.   Social History     Socioeconomic History    Marital status: SINGLE     Spouse name: Not on file    Number of children: Not on file    Years of education: Not on file    Highest education level: Not on file   Occupational History    Not on file   Tobacco Use    Smoking status: Former Smoker     Quit date: 2021     Years since quittin.0    Smokeless tobacco: Never Used   Vaping Use    Vaping Use: Never used   Substance and Sexual Activity    Alcohol use: Not Currently    Drug use: Not Currently    Sexual activity: Not on file   Other Topics Concern     Service No    Blood Transfusions No    Caffeine Concern No    Occupational Exposure No    Hobby Hazards No    Sleep Concern No    Stress Concern No    Weight Concern No    Special Diet No    Back Care No    Exercise No    Bike Helmet No    Seat Belt No    Self-Exams No   Social History Narrative    Not on file     Social Determinants of Health     Financial Resource Strain:     Difficulty of Paying Living Expenses:    Food Insecurity:     Worried About Running Out of Food in the Last Year:     Ran Out of Food in the Last Year:    Transportation Needs:     Lack of Transportation (Medical):  Lack of Transportation (Non-Medical):    Physical Activity:     Days of Exercise per Week:     Minutes of Exercise per Session:    Stress:     Feeling of Stress :    Social Connections:     Frequency of Communication with Friends and Family:     Frequency of Social Gatherings with Friends and Family:     Attends [employer] Services:     Active Member of Clubs or Organizations:     Attends Club or Organization Meetings:     Marital Status:    Intimate Partner Violence:     Fear of Current or Ex-Partner:     Emotionally Abused:     Physically Abused:     Sexually Abused:      History reviewed. No pertinent family history.   No Known Allergies    Current Facility-Administered Medications   Medication Dose Route Frequency    acetaminophen (TYLENOL) tablet 650 mg  650 mg Oral Q6H PRN    Or    acetaminophen (TYLENOL) suppository 650 mg  650 mg Rectal Q6H PRN    polyethylene glycol (MIRALAX) packet 17 g  17 g Oral DAILY PRN    ondansetron (ZOFRAN ODT) tablet 4 mg  4 mg Oral Q8H PRN    Or    ondansetron (ZOFRAN) injection 4 mg  4 mg IntraVENous Q6H PRN    0.9% sodium chloride infusion  50 mL/hr IntraVENous CONTINUOUS    insulin lispro (HUMALOG) injection   SubCUTAneous AC&HS    piperacillin-tazobactam (ZOSYN) 3.375 g in 0.9% sodium chloride (MBP/ADV) 100 mL MBP  3.375 g IntraVENous Q8H    metoprolol tartrate (LOPRESSOR) tablet 25 mg  25 mg Oral Q12H    aspirin delayed-release tablet 81 mg  81 mg Oral DAILY    LORazepam (ATIVAN) injection 1 mg  1 mg IntraVENous Q6H PRN    insulin glargine (LANTUS) injection 35 Units  35 Units SubCUTAneous QHS    nicotine (NICODERM CQ) 21 mg/24 hr patch 1 Patch  1 Patch TransDERmal Q24H    albuterol (PROVENTIL VENTOLIN) nebulizer solution 2.5 mg  2.5 mg Nebulization Q4H PRN    budesonide (PULMICORT) 500 mcg/2 ml nebulizer suspension  500 mcg Nebulization BID RT    sodium chloride (NS) flush 5-10 mL  5-10 mL IntraVENous Q8H    sodium chloride (NS) flush 5-10 mL  5-10 mL IntraVENous PRN         Objective:     Vitals:    06/10/21 2314 06/11/21 0039 06/11/21 0347 06/11/21 0715   BP:  138/77 (!) 145/78 (!) 143/76   Pulse: 96 85 (!) 104 80   Resp:  25 26 28   Temp:  97.7 °F (36.5 °C) 98.1 °F (36.7 °C) 98 °F (36.7 °C)   SpO2: 94% 94% 94% 95%   Weight:  161 lb 3.2 oz (73.1 kg) 161 lb 12.8 oz (73.4 kg)    Height:  5' 11\" (1.803 m)         PHYSICAL EXAM     Constitutional:  the patient is thin and in no acute distress  EENMT:  Sclera clear, pupils equal, oral mucosa moist  Respiratory: On 3L O2 NC, rhonchi especial in upper airways  Cardiovascular:  RRR without M,G,R  Gastrointestinal: soft and non-tender; with positive bowel sounds. Musculoskeletal: warm without cyanosis. There is no lower extremity edema. Skin:  no jaundice or rashes, no wounds   Neurologic: no gross neuro deficits     Psychiatric:  Lethargic and drowsy, rouses to voice    CXR:  6/10/2021          CT Chest: 6/10/2021      IMPRESSION  1. No acute pulmonary embolus. 2. Defect or rupture of the posterior tracheal wall near the thoracic inlet with  air extending into the upper mediastinal soft tissues indicative of  pneumomediastinum. 3. No acute abnormality noted in the lungs.   4. Nodular morphology to the liver can be seen with cirrhosis. 5. Obstruction of the bilateral lower lobar bronchi likely mucus impaction. Aspirated fluid is not excluded. ECHOCARDIOGRAM:  3/21/2021                    Recent Labs     06/11/21  0459 06/10/21  1825   WBC 16.1* 11.6*   HGB 15.8 18.4*   HCT 46.8 54.4*    384     Recent Labs     06/11/21  0459 06/10/21  1951 06/10/21  1825   *  --  121*   K 4.4 4.5 7.7*   CL 90*  --  80*   *  --  464*   CO2 28  --  32   BUN 29*  --  31*   CREA 0.61*  --  0.97   MG 2.1  --  2.2   CA 8.4  --  9.3   ALB  --   --  2.2*   TBILI  --   --  1.3*   ALT  --   --  23     No results for input(s): PH, PCO2, PO2, HCO3, PHI, PCO2I, PO2I, HCO3I in the last 72 hours.   Recent Labs     06/11/21  0459 06/10/21  2313 06/10/21  1825   LAC 2.0 2.9* 2.6*       Cultures:  Blood x2  Urine-  Sputum-  Wound-  Pleural fluid  Abdominal fluid-    Inpat Anti-Infectives (From admission, onward)     Start     Ordered Stop    06/11/21 0600  piperacillin-tazobactam (ZOSYN) 3.375 g in 0.9% sodium chloride (MBP/ADV) 100 mL MBP  3.375 g,   IntraVENous,   EVERY 8 HOURS      06/11/21 0031 --                  Assessment:  (Medical Decision Making)     Hospital Problems  Date Reviewed: 6/11/2021        Codes Class Noted POA    * (Principal) Pneumomediastinum ([facility] ICD-10-CM: J98.2  ICD-9-CM: 518.1  6/10/2021 Unknown        Substance abuse ([facility] (Chronic) ICD-10-CM: F19.10  ICD-9-CM: 305.90  6/10/2021 Unknown        Acute hyponatremia ICD-10-CM: E87.1  ICD-9-CM: 276.1  6/10/2021 Unknown        Uncontrolled type II diabetes mellitus ([facility] (Chronic) ICD-10-CM: E11.65  ICD-9-CM: 250.02  6/10/2021 Unknown        Dehydration ICD-10-CM: E86.0  ICD-9-CM: 276.51  6/10/2021 Unknown              Plan:  (Medical Decision Making)     --On 3L O2 NC, wean as tolerated  --Consider bronchoscopy, patient is NPO however patient's hyponatremia would need to be corrected first  --Awaiting blood cultures, will check sputum culture and UDS, continue Zosyn  --Change Duoneb to Albuterol, add Pulmicort  --Add Nicoderm patch daily  --GI/DVT prophylaxis  --Full Code      More than 50% of the time documented was spent in face-to-face contact with the patient and in the care of the patient on the floor/unit where the patient is located. Thank you very much for this referral.  We appreciate the opportunity to participate in this patient's care. Will follow along with above stated plan. Jeet Panchal NP       Lungs:  Diffuse B exp wheeze, rhonchi  Heart:  RRR with no Murmur/Rubs/Gallops    Additional Comments:    Patient presenting with what appears to be COPD exacerbation. Wheeze and rhonchi on exam. Will start solumedrol and add albuterol to pulmicort. Also looks to have mucus plugging of RLL. Already on zosyn. F/u sputum culture if can obtain one. Possibility of tracheal wall tear. These typically occur either due to injury (he has had none recently) or iatrogenically during surgery or intubation. He was intubated at [facility] in March so it is possible he sustained injury at that time. However, if it has been present this long, does not seem like an emergent problem. Will need to perform direct visual inspection but safest to do this when his COPD exacerbation has improved and with MAC anesthesia given his reported history of meth abuse. Will look to schedule this for Monday and reassess his respiratory status that day. Can eat for now and will make NPO Sunday night. I have spoken with and examined the patient. I agree with the above assessment and plan as documented.     Tito Duffy MD

## 2021-06-11 NOTE — PROGRESS NOTES
MSN, CM:  Spoke with patient this AM about discharge planning. Patient lives with his wife Silverio Short" in an 8th floor apartment. Patient states there is a working elevator at the apartment complex. Patient has a daughter Jose Harvey" that lives in [address] [facility]. Patient requires help with ADL's in which the wife helps with that. Patient requires the use of a cane or walker for ambulation. Wife requested a motorized w/c in which she was informed that those orders would come from PCP and his insurance would make a determination if patient needed one. Wife verbalized understanding. Wife also inquired about BSC in which this CM explained this CM would get one for the patient via [employer].  Patient denies any home oxygen or rehab in the past.  Patient has received HH with [facility] per wife. Patient confirms PCP is Dr. Roshan Parson and Gamal El drives him to all appointments. PT and OT consulted for evaluatoin and recommendations. Case Management will continue to follow for discharge needs. Care Management Interventions  PCP Verified by CM: Yes (Dr. Roshan Parson)  Mode of Transport at Discharge:  Other (see comment) (family to transport)  Transition of Care Consult (CM Consult): Discharge Planning  Health Maintenance Reviewed: Yes  Physical Therapy Consult: Yes  Occupational Therapy Consult: Yes  Current Support Network: Lives with Spouse, Other (8th floor apartment)  Confirm Follow Up Transport: Family  Freedom of Choice List was Provided with Basic Dialogue that Supports the Patient's Individualized Plan of Care/Goals, Treatment Preferences and Shares the Quality Data Associated with the Providers?: Yes  Discharge Location  Discharge Placement: Unable to determine at this time

## 2021-06-11 NOTE — PROGRESS NOTES
Assumed care of patient. Assessment completed and documented, see docflow. Patient resting quietly in bed. Clammy, afebrile. Attempted to discuss care, assess LOC; patient lethargic, returns quickly to sleep. NAD noted at present. Tachypneic, rhonchi noted bilaterally. IVF infusing without difficulty. Patient remains NPO for Pulmonary consult. Call light within reach. Will continue to monitor.

## 2021-06-11 NOTE — PROGRESS NOTES
Monitor room notified this RN of 12 beat run of VT. Assessed patient. Patient sleeping. VS WNL. Friend at bedside. Dr Priscilla Reid made aware.

## 2021-06-11 NOTE — PROGRESS NOTES
Patient resting in bed, alert and oriented, cooperative with care. Patient on 3 liters of Oxygen via NC. Patient denies pain or distress. Dual skin assessment completed with Wayne Hartley RN no skin issues noted at this time multiple tattoos noted on both arms and right lower shin. Safety measures in place, call light within reach.

## 2021-06-11 NOTE — H&P
[facility] History and Physical       Name:  Koko Griffith  Age:55 y.o. Sex:male   :  1965    MRN:  [id number]   PCP:  None      Admit Date:  6/10/2021  5:12 PM   Chief Complaint: [place] Man via EMS from home. Complains of SOB beginning 2 days ago. States productive cough. 88% on room air. Pt placed on 2L supp O2\"    Reason for Admission:   Pneumomediastinum [facility]) [J98.2]    Assessment & Plan:     Principal Problem:    Pneumomediastinum ([facility] [address] (6/10/2021)    62 yo with h/o  COPD, meth use, NSVT, poorly controlled DM, laryngeal thrush (vs vocal cord mass)  here with persistent SOB and cough, found to have pneumomediastinum. ER  Has contacted pulmonary with plans for possible procedure in AM    Active Problems:    Substance abuse ([facility] [address] (6/10/2021)          Acute hyponatremia (6/10/2021) - due to hypovolemia          Uncontrolled type II diabetes mellitus ([facility] [address] (6/10/2021)          Dehydration (6/10/2021)            PLAN:  1) Admit to tele remote  2) Will keep NPO, hydrate, pain control prn  3) Will continue patient on Abx as started in the ER, due to elevated lactic acid, possible aspiration, post-obstructive process  4) BG control. Resume home meds including lantus and titrate  5) Pulmonary consult in AM  6) Correct electrolyte abnormalities with IVF and BMP q 6h  7) trend lactic acid      Disposition/Expected LOS: 3  Diet: DIET NPO  VTE ppx: scd  GI ppx:   Code status: No Order  Surrogate decision-maker:       History of Presenting Illness:     Koko Griffith is a 63 y.o. male with medical history of poorly controlled DM2 and HTN, COPD, NSVT,  HCV, CHF, and heavy tobacco use presents with 3 days of cough and shortness of breath. Cough is productive of a yellow nonbloody sputum. Has had subjective fever. No chest pain. No leg pain or swelling. He was just seen in ER yesterday at [facility] for same, had workup including NEG CXR, got some nebs and was discharge home.  Family member mentions decreased po intake, weakness and fatigue    Today, he also had CXR which was also benign. This was followed by CT chest which showed pneumomediastinum. Labs revealed hyponatremia 121 and elevated lactic acid. Hospitalist asked to admit. Chart reviewed, shows most recent admissions have been at [facility], last one in march wherein he was found to be in resp failure, required intubation revealing vocal cord mass vs laryngeal thrush. He was evaluated by ENT and treated with diflucan. During his hospitalization an echocardiogram was performed and showed decreased EF 35-40% with septal hypokinesis. Review of Systems:  A 14 point review of systems was taken and pertinent positive as per HPI. Past Medical History:   Diagnosis Date    Diabetes mellitus ([facility] [address]     HCV (hepatitis C virus)     HTN (hypertension)     [facility]     Vocal cord mass        Past surgical history: NONE    Family History : reviewed, nothing significant to present illness       Social History     Tobacco Use    Smoking status: 2-3 ppd   Substance Use Topics    Alcohol use:        No Known Allergies          PTA Medications:  Current Outpatient Medications   Medication Instructions    aspirin delayed-release 81 mg, Oral, DAILY    Lantus Solostar U-100 Insulin 25 Units, SubCUTAneous, EVERY BEDTIME    lisinopriL (PRINIVIL, ZESTRIL) 10 mg, Oral, DAILY    metoprolol tartrate (LOPRESSOR) 25 mg, Oral, 2 TIMES DAILY       Objective:     Patient Vitals for the past 24 hrs:   Temp Pulse Resp BP SpO2   06/10/21 1720     97 %   06/10/21 1715 97.9 °F (36.6 °C) 96 24 128/80 (!) 88 %       Oxygen Therapy  O2 Sat (%): 97 % (06/10/21 1720)  Pulse via Oximetry: 96 beats per minute (06/10/21 1715)  O2 Device: Nasal cannula (06/10/21 2051)  O2 Flow Rate (L/min): 3 l/min (06/10/21 2051)    There is no height or weight on file to calculate BMI.     Physical Exam:    General:  Alert and oriented x 3, moving around, unable to get comfortable, raspy voice, appears older than stated age. HEENT:  Pupils equal and reactive to light and accommodation, oropharynx is clear   Neck:   Supple, no lymphadenopathy, no JVD   Lungs:  Rhonchi throughout bilaterally   CV:   Regular rate, regular rhythm, with normal S1 and S2   Abdomen:  Soft, nontender, nondistended, normoactive bowel sounds   Extremities:  No cyanosis clubbing or edema   Neuro:  Nonfocal, A&O x3   Psych:  Normal mood and affect       Data Reviewed: I have reviewed all labs, meds, and studies. Recent Results (from the past 24 hour(s))   EKG, 12 LEAD, INITIAL    Collection Time: 06/10/21  5:32 PM   Result Value Ref Range    Ventricular Rate 89 BPM    Atrial Rate 89 BPM    P-R Interval 198 ms    QRS Duration 152 ms    Q-T Interval 450 ms    QTC Calculation (Bezet) 547 ms    Calculated P Axis 82 degrees    Calculated R Axis -64 degrees    Calculated T Axis 101 degrees    Diagnosis       !! AGE AND GENDER SPECIFIC ECG ANALYSIS !! Sinus rhythm with Premature supraventricular complexes with occasional   Premature ventricular complexes  Biatrial enlargement  Non-specific intra-ventricular conduction block  anterior infarct, age indeterminant  Abnormal ECG  No previous ECGs available  Confirmed by Sterling Harvey MD (), ELLIS RIZVI ([id number]) on 6/10/2021 6:07:12 PM     CBC WITH AUTOMATED DIFF    Collection Time: 06/10/21  6:25 PM   Result Value Ref Range    WBC 11.6 (H) 4.3 - 11.1 K/uL    RBC 6.89 (H) 4.23 - 5.6 M/uL    HGB 18.4 (H) 13.6 - 17.2 g/dL    HCT 54.4 (H) 41.1 - 50.3 %    MCV 79.0 (L) 79.6 - 97.8 FL    MCH 26.7 26.1 - 32.9 PG    MCHC 33.8 31.4 - 35.0 g/dL    RDW 13.9 11.9 - 14.6 %    PLATELET 514 076 - 632 K/uL    MPV 10.8 9.4 - 12.3 FL    ABSOLUTE NRBC 0.00 0.0 - 0.2 K/uL    DF AUTOMATED      NEUTROPHILS 76 43 - 78 %    LYMPHOCYTES 15 13 - 44 %    MONOCYTES 8 4.0 - 12.0 %    EOSINOPHILS 0 (L) 0.5 - 7.8 %    BASOPHILS 1 0.0 - 2.0 %    IMMATURE GRANULOCYTES 0 0.0 - 5.0 %    ABS. NEUTROPHILS 8.9 (H) 1.7 - 8.2 K/UL    ABS. LYMPHOCYTES 1.7 0.5 - 4.6 K/UL    ABS. MONOCYTES 0.9 0.1 - 1.3 K/UL    ABS. EOSINOPHILS 0.1 0.0 - 0.8 K/UL    ABS. BASOPHILS 0.1 0.0 - 0.2 K/UL    ABS. IMM. GRANS. 0.0 0.0 - 0.5 K/UL   MAGNESIUM    Collection Time: 06/10/21  6:25 PM   Result Value Ref Range    Magnesium 2.2 1.8 - 2.4 mg/dL   LACTIC ACID    Collection Time: 06/10/21  6:25 PM   Result Value Ref Range    Lactic acid 2.6 (H) 0.4 - 2.0 MMOL/L   METABOLIC PANEL, COMPREHENSIVE    Collection Time: 06/10/21  6:25 PM   Result Value Ref Range    Sodium 121 (L) 136 - 145 mmol/L    Potassium 7.7 (HH) 3.5 - 5.1 mmol/L    Chloride 80 (L) 98 - 107 mmol/L    CO2 32 21 - 32 mmol/L    Anion gap 9 7 - 16 mmol/L    Glucose 464 (HH) 65 - 100 mg/dL    BUN 31 (H) 6 - 23 MG/DL    Creatinine 0.97 0.8 - 1.5 MG/DL    GFR est AA >60 >60 ml/min/1.73m2    GFR est non-AA >60 >60 ml/min/1.73m2    Calcium 9.3 8.3 - 10.4 MG/DL    Bilirubin, total 1.3 (H) 0.2 - 1.1 MG/DL    ALT (SGPT) 23 12 - 65 U/L    AST (SGOT) 120 (H) 15 - 37 U/L    Alk.  phosphatase 184 (H) 50 - 136 U/L    Protein, total 9.0 (H) 6.3 - 8.2 g/dL    Albumin 2.2 (L) 3.5 - 5.0 g/dL    Globulin 6.8 (H) 2.3 - 3.5 g/dL    A-G Ratio 0.3 (L) 1.2 - 3.5     TROPONIN-HIGH SENSITIVITY    Collection Time: 06/10/21  6:25 PM   Result Value Ref Range    Troponin-High Sensitivity 84.7 (H) 0 - 14 pg/mL   POTASSIUM    Collection Time: 06/10/21  7:51 PM   Result Value Ref Range    Potassium 4.5 3.5 - 5.1 mmol/L   TROPONIN-HIGH SENSITIVITY    Collection Time: 06/10/21  7:51 PM   Result Value Ref Range    Troponin-High Sensitivity 77.5 (H) 0 - 14 pg/mL   EKG, 12 LEAD, INITIAL    Collection Time: 06/10/21  7:54 PM   Result Value Ref Range    Ventricular Rate 96 BPM    Atrial Rate 96 BPM    P-R Interval 196 ms    QRS Duration 150 ms    Q-T Interval 410 ms    QTC Calculation (Bezet) 517 ms    Calculated P Axis 86 degrees    Calculated R Axis -54 degrees    Calculated T Axis 105 degrees    Diagnosis Sinus rhythm with sinus arrhythmia with occasional Premature ventricular   complexes  Biatrial enlargement  Non-specific intra-ventricular conduction block  Abnormal ECG  When compared with ECG of 10-NICHOLE-2021 17:32,  Premature supraventricular complexes are no longer Present  Serial changes of Lateral infarct Present  Confirmed by Nikole Santos ([id number]) on 6/10/2021 8:52:09 PM         EKG Results     Procedure 720 Value Units Date/Time    EKG, 12 LEAD, INITIAL [[id number]] Collected: 06/10/21 1954    Order Status: Completed Updated: 06/10/21 2052     Ventricular Rate 96 BPM      Atrial Rate 96 BPM      P-R Interval 196 ms      QRS Duration 150 ms      Q-T Interval 410 ms      QTC Calculation (Bezet) 517 ms      Calculated P Axis 86 degrees      Calculated R Axis -54 degrees      Calculated T Axis 105 degrees      Diagnosis --       Sinus rhythm with sinus arrhythmia with occasional Premature ventricular   complexes  Biatrial enlargement  Non-specific intra-ventricular conduction block  Abnormal ECG  When compared with ECG of 10-NICHOLE-2021 17:32,  Premature supraventricular complexes are no longer Present  Serial changes of Lateral infarct Present  Confirmed by Nikole Santos ([id number]) on 6/10/2021 8:52:09 PM      EKG [[id number]] Collected: 06/10/21 1732    Order Status: Completed Updated: 06/10/21 1807     Ventricular Rate 89 BPM      Atrial Rate 89 BPM      P-R Interval 198 ms      QRS Duration 152 ms      Q-T Interval 450 ms      QTC Calculation (Bezet) 547 ms      Calculated P Axis 82 degrees      Calculated R Axis -64 degrees      Calculated T Axis 101 degrees      Diagnosis --     !! AGE AND GENDER SPECIFIC ECG ANALYSIS !!   Sinus rhythm with Premature supraventricular complexes with occasional   Premature ventricular complexes  Biatrial enlargement  Non-specific intra-ventricular conduction block  anterior infarct, age indeterminant  Abnormal ECG  No previous ECGs available  Confirmed by Sterling Harvey MD (), Enoch Maciel ([id number]) on 6/10/2021 6:07:12 PM            All Micro Results     Procedure Component Value Units Date/Time    CULTURE, BLOOD [[id number]] Collected: 06/10/21 1825    Order Status: Completed Specimen: Blood Updated: 06/10/21 2008    CULTURE, BLOOD [[id number]] Collected: 06/10/21 1825    Order Status: Completed Specimen: Blood Updated: 06/10/21 2008          Other Studies:  XR CHEST PORT    Result Date: 6/10/2021  Portable chest x-ray CLINICAL INDICATION: Syncope FINDINGS: Single AP view of the chest submitted without comparison show the lungs to be expanded and clear. No pleural effusion or pneumothorax. The cardiac silhouette and mediastinum are unremarkable. The bones are normal.     Unremarkable portable chest x-ray. No acute abnormality evident. CT CHEST PULMONARY EMBOLISM    Result Date: 6/10/2021  CT OF THE CHEST WITH CONTRAST, PULMONARY EMBOLUS PROTOCOL. CLINICAL INDICATION: Shortness of breath for two days; productive cough, hypoxia PROCEDURE: Serial thin section axial images are obtained from the thoracic inlet through the upper abdomen following the administration of intravenous contrast per a dedicated, institutional pulmonary embolus protocol. Coronal MIP reformatted images are generated. Radiation dose reduction techniques were used for this study. Our CT scanners use one or all of the following: Automated exposure control, adjusted of the mA and/or kV according to patient size, iterative reconstruction COMPARISON: No prior FINDINGS: There is adequate opacification of the central pulmonary arterial tree. No central intraluminal filling defect noted to indicate acute pulmonary embolus. No mediastinal or axillary adenopathy evident. There is no pneumothorax. No airspace consolidation noted. There is no pleural or pericardial effusion. There is no mediastinal or axillary adenopathy.  There is a defect in the posterior wall of the trachea in the upper mediastinum at the thoracic inlet with extension of air into the paratracheal soft tissues in keeping with a small pneumomediastinum. There is obstruction of the lobar bronchi to the bilateral lower lobes with intrabronchial substance presumably mucus impaction Limited evaluation of the upper abdomen shows a nodular morphology to the liver. Cirrhosis could be considered. No aggressive osseous lesions identified. 1. No acute pulmonary embolus. 2. Defect or rupture of the posterior tracheal wall near the thoracic inlet with air extending into the upper mediastinal soft tissues indicative of pneumomediastinum. 3. No acute abnormality noted in the lungs. 4. Nodular morphology to the liver can be seen with cirrhosis. 5. Obstruction of the bilateral lower lobar bronchi likely mucus impaction. Aspirated fluid is not excluded.          Medications:  Medications Administered      Medications Administered     iopamidoL (ISOVUE-370) 76 % injection 100 mL     Admin Date  06/10/2021 Action  Given Dose  100 mL Route  IntraVENous Administered By  Melanie VALDES          piperacillin-tazobactam (ZOSYN) 4.5 g in 0.9% sodium chloride (MBP/ADV) 100 mL MBP     Admin Date  06/10/2021 Action  New Bag Dose  4.5 g Rate  200 mL/hr Route  IntraVENous Administered By  Makeda Rosales RN          saline peripheral flush soln 10 mL     Admin Date  06/10/2021 Action  Given Dose  10 mL Route  InterCATHeter Administered By  Jose Juan Lam          sodium chloride 0.9 % bolus infusion 1,000 mL     Admin Date  06/10/2021 Action  New Bag Dose  1,000 mL Rate  1,000 mL/hr Route  IntraVENous Administered By  Jocelynn Cuevas RN          sodium chloride 0.9 % bolus infusion 100 mL     Admin Date  06/10/2021 Action  New Bag Dose  100 mL Rate   Route  IntraVENous Administered By  Jose Juan Lam                    Signed By: Scarlet Carrizales MD   [employer] [facility]    Jessica 10, 2021   10:32 PM

## 2021-06-11 NOTE — PROGRESS NOTES
TRANSFER - IN REPORT:    Verbal report received from [facility] on Dellila Colla  being received from ED for routine progression of care      Report consisted of patients Situation, Background, Assessment and   Recommendations(SBAR). Information from the following report(s) Kardex was reviewed with the receiving nurse. Opportunity for questions and clarification was provided. Assessment completed upon patients arrival to unit and care assumed.

## 2021-06-11 NOTE — PROGRESS NOTES
[facility]ist Progress Note     Name:  Aide Rosenberg  Age:55 y.o. Sex:male   :  1965    MRN:  [id number]     Admit Date:  6/10/2021    Reason for Admission:  Pneumomediastinum [facility]) [J98.2]    Assessment & Plan       Acute respiratory failure with COPD exacerbation, pneumonia, possible tracheal defect with pneumomediastinum:  · Wean O2 as tolerant  · D1 zosyn  · IV steroids  · Scheduled nebs, pulmicort  · Possible bronch  · appreciate pulmonary  · Needs smoking cessation       Dm2:  · SSI  · Continued lantus   · Gentle IVF        Hyponatremia:  · Corrected for hyperglycemia 135  · Trend BMP      VTACH:  · followup remote tele   · Recheck potassium/ magnesium  · Continued metoprolol       Elevated LFTS, HEPC:  · Trend LFTS            Diet:  DIET ADULT  DIET NPO  DVT PPx: SCD  GI Ppx:  none  Code: Full Code    Dispo/Discharge Planning: pending     Hospital Course/Subjective:       Mr. Kathy Dennis is a 64 yo male PMH of COPD, DM2, HTN, tobacco use, methamphetamine use, HEPC, HFrEF, VTACH admitted with COPD exacerbation. CTA chest shows defect or rupture of posterior tracheal wall near thoracic inlet with air into the upper mediastinum and soft tissues, obstruction of bilateral lower bronchi. He has been seen by pulmonary and placed on nebs, pulmicort, IV steroids and zosyn. BC pending. Plans are for bronch when respiratory status has improved.        Discharge plans pending         Subjective/24 hr Events (21):    Sleeping, significant other present , some dyspnea, didn't eat too much    RN reports VTACH    Objective:     Patient Vitals for the past 24 hrs:   Temp Pulse Resp BP SpO2   21 1245 97.5 °F (36.4 °C) 79 24 113/60 91 %   21 1126 98.2 °F (36.8 °C) 78 24 122/70 92 %   21 1056     92 %   21 0715 98 °F (36.7 °C) 80 28 (!) 143/76 95 %   21 0347 98.1 °F (36.7 °C) (!) 104 26 (!) 145/78 94 %   21 0039 97.7 °F (36.5 °C) 85 25 138/77 94 %   06/10/21 2314  96   94 %   06/10/21 2238  92   98 %   06/10/21 2203  (!) 103 27  92 %   06/10/21 2111  (!) 107  137/81 91 %   06/10/21 1720     97 %   06/10/21 1715 97.9 °F (36.6 °C) 96 24 128/80 (!) 88 %     Oxygen Therapy  O2 Sat (%): 91 % (06/11/21 1245)  Pulse via Oximetry: 60 beats per minute (06/11/21 1056)  O2 Device: Nasal cannula (06/11/21 1056)  Skin Assessment: Clean, dry, & intact (06/11/21 1056)  O2 Flow Rate (L/min): 3 l/min (06/11/21 1056)    Body mass index is 22.57 kg/m². Physical Exam:   General:     Sleepy, no distress    Lungs:   Coarse anterior   Cardiac:   RRR, Normal S1 and S2. No S3, S4 or murmurs. No edema   Abdomen:   Soft, non distended, nontender, +BS, no guarding/rebound  Neuro:   nonfocal        Data Review:  I have reviewed all labs, meds, and studies from the last 24 hours:    Labs:  Recent Results (from the past 24 hour(s))   EKG, 12 LEAD, INITIAL    Collection Time: 06/10/21  5:32 PM   Result Value Ref Range    Ventricular Rate 89 BPM    Atrial Rate 89 BPM    P-R Interval 198 ms    QRS Duration 152 ms    Q-T Interval 450 ms    QTC Calculation (Bezet) 547 ms    Calculated P Axis 82 degrees    Calculated R Axis -64 degrees    Calculated T Axis 101 degrees    Diagnosis       !! AGE AND GENDER SPECIFIC ECG ANALYSIS !!   Sinus rhythm with Premature supraventricular complexes with occasional   Premature ventricular complexes  Biatrial enlargement  Non-specific intra-ventricular conduction block  anterior infarct, age indeterminant  Abnormal ECG  No previous ECGs available  Confirmed by Thomas Byrd MD (), ELLIS RIZVI ([id number]) on 6/10/2021 6:07:12 PM     CBC WITH AUTOMATED DIFF    Collection Time: 06/10/21  6:25 PM   Result Value Ref Range    WBC 11.6 (H) 4.3 - 11.1 K/uL    RBC 6.89 (H) 4.23 - 5.6 M/uL    HGB 18.4 (H) 13.6 - 17.2 g/dL    HCT 54.4 (H) 41.1 - 50.3 %    MCV 79.0 (L) 79.6 - 97.8 FL    MCH 26.7 26.1 - 32.9 PG    MCHC 33.8 31.4 - 35.0 g/dL    RDW 13.9 11.9 - 14.6 % PLATELET 739 294 - 500 K/uL    MPV 10.8 9.4 - 12.3 FL    ABSOLUTE NRBC 0.00 0.0 - 0.2 K/uL    DF AUTOMATED      NEUTROPHILS 76 43 - 78 %    LYMPHOCYTES 15 13 - 44 %    MONOCYTES 8 4.0 - 12.0 %    EOSINOPHILS 0 (L) 0.5 - 7.8 %    BASOPHILS 1 0.0 - 2.0 %    IMMATURE GRANULOCYTES 0 0.0 - 5.0 %    ABS. NEUTROPHILS 8.9 (H) 1.7 - 8.2 K/UL    ABS. LYMPHOCYTES 1.7 0.5 - 4.6 K/UL    ABS. MONOCYTES 0.9 0.1 - 1.3 K/UL    ABS. EOSINOPHILS 0.1 0.0 - 0.8 K/UL    ABS. BASOPHILS 0.1 0.0 - 0.2 K/UL    ABS. IMM. GRANS. 0.0 0.0 - 0.5 K/UL   MAGNESIUM    Collection Time: 06/10/21  6:25 PM   Result Value Ref Range    Magnesium 2.2 1.8 - 2.4 mg/dL   LACTIC ACID    Collection Time: 06/10/21  6:25 PM   Result Value Ref Range    Lactic acid 2.6 (H) 0.4 - 2.0 MMOL/L   CULTURE, BLOOD    Collection Time: 06/10/21  6:25 PM    Specimen: Blood   Result Value Ref Range    Special Requests: LAC     Culture result: NO GROWTH AFTER 10 HOURS     CULTURE, BLOOD    Collection Time: 06/10/21  6:25 PM    Specimen: Blood   Result Value Ref Range    Special Requests: RAC     Culture result: NO GROWTH AFTER 10 HOURS     METABOLIC PANEL, COMPREHENSIVE    Collection Time: 06/10/21  6:25 PM   Result Value Ref Range    Sodium 121 (L) 136 - 145 mmol/L    Potassium 7.7 (HH) 3.5 - 5.1 mmol/L    Chloride 80 (L) 98 - 107 mmol/L    CO2 32 21 - 32 mmol/L    Anion gap 9 7 - 16 mmol/L    Glucose 464 (HH) 65 - 100 mg/dL    BUN 31 (H) 6 - 23 MG/DL    Creatinine 0.97 0.8 - 1.5 MG/DL    GFR est AA >60 >60 ml/min/1.73m2    GFR est non-AA >60 >60 ml/min/1.73m2    Calcium 9.3 8.3 - 10.4 MG/DL    Bilirubin, total 1.3 (H) 0.2 - 1.1 MG/DL    ALT (SGPT) 23 12 - 65 U/L    AST (SGOT) 120 (H) 15 - 37 U/L    Alk.  phosphatase 184 (H) 50 - 136 U/L    Protein, total 9.0 (H) 6.3 - 8.2 g/dL    Albumin 2.2 (L) 3.5 - 5.0 g/dL    Globulin 6.8 (H) 2.3 - 3.5 g/dL    A-G Ratio 0.3 (L) 1.2 - 3.5     TROPONIN-HIGH SENSITIVITY    Collection Time: 06/10/21  6:25 PM   Result Value Ref Range Troponin-High Sensitivity 84.7 (H) 0 - 14 pg/mL   POTASSIUM    Collection Time: 06/10/21  7:51 PM   Result Value Ref Range    Potassium 4.5 3.5 - 5.1 mmol/L   TROPONIN-HIGH SENSITIVITY    Collection Time: 06/10/21  7:51 PM   Result Value Ref Range    Troponin-High Sensitivity 77.5 (H) 0 - 14 pg/mL   EKG, 12 LEAD, INITIAL    Collection Time: 06/10/21  7:54 PM   Result Value Ref Range    Ventricular Rate 96 BPM    Atrial Rate 96 BPM    P-R Interval 196 ms    QRS Duration 150 ms    Q-T Interval 410 ms    QTC Calculation (Bezet) 517 ms    Calculated P Axis 86 degrees    Calculated R Axis -54 degrees    Calculated T Axis 105 degrees    Diagnosis         Sinus rhythm with sinus arrhythmia with occasional Premature ventricular   complexes  Biatrial enlargement  Non-specific intra-ventricular conduction block  Abnormal ECG  When compared with ECG of 10-NICHOLE-2021 17:32,  Premature supraventricular complexes are no longer Present  Serial changes of Lateral infarct Present  Confirmed by Maria Del Carmen Bautista ([id number]) on 6/10/2021 8:52:09 PM     LACTIC ACID    Collection Time: 06/10/21 11:13 PM   Result Value Ref Range    Lactic acid 2.9 (H) 0.4 - 2.0 MMOL/L   GLUCOSE, POC    Collection Time: 06/11/21 12:57 AM   Result Value Ref Range    Glucose (POC) 514 (HH) 65 - 100 mg/dL    Performed by Rebecca    MAGNESIUM    Collection Time: 06/11/21  4:59 AM   Result Value Ref Range    Magnesium 2.1 1.8 - 2.4 mg/dL   CBC W/O DIFF    Collection Time: 06/11/21  4:59 AM   Result Value Ref Range    WBC 16.1 (H) 4.3 - 11.1 K/uL    RBC 5.98 (H) 4.23 - 5.6 M/uL    HGB 15.8 13.6 - 17.2 g/dL    HCT 46.8 41.1 - 50.3 %    MCV 78.3 (L) 79.6 - 97.8 FL    MCH 26.4 26.1 - 32.9 PG    MCHC 33.8 31.4 - 35.0 g/dL    RDW 12.9 11.9 - 14.6 %    PLATELET 599 602 - 068 K/uL    MPV 10.1 9.4 - 12.3 FL    ABSOLUTE NRBC 0.00 0.0 - 0.2 K/uL   LACTIC ACID    Collection Time: 06/11/21  4:59 AM   Result Value Ref Range    Lactic acid 2.0 0.4 - 2.0 MMOL/L   METABOLIC PANEL, BASIC    Collection Time: 06/11/21  4:59 AM   Result Value Ref Range    Sodium 128 (L) 138 - 145 mmol/L    Potassium 4.4 3.5 - 5.1 mmol/L    Chloride 90 (L) 98 - 107 mmol/L    CO2 28 21 - 32 mmol/L    Anion gap 10 7 - 16 mmol/L    Glucose 393 (H) 65 - 100 mg/dL    BUN 29 (H) 6 - 23 MG/DL    Creatinine 0.61 (L) 0.8 - 1.5 MG/DL    GFR est AA >60 >60 ml/min/1.73m2    GFR est non-AA >60 >60 ml/min/1.73m2    Calcium 8.4 8.3 - 10.4 MG/DL   GLUCOSE, POC    Collection Time: 06/11/21  5:34 AM   Result Value Ref Range    Glucose (POC) 392 (H) 65 - 100 mg/dL    Performed by [employer]    GLUCOSE, POC    Collection Time: 06/11/21 11:17 AM   Result Value Ref Range    Glucose (POC) 267 (H) 65 - 100 mg/dL    Performed by [address] [place] [address], URINE    Collection Time: 06/11/21  1:30 PM   Result Value Ref Range    PCP(PHENCYCLIDINE) Negative      BENZODIAZEPINES Negative      COCAINE Negative      AMPHETAMINES Positive      METHADONE Negative      THC (TH-CANNABINOL) Negative      OPIATES Negative      BARBITURATES Negative         All Micro Results     Procedure Component Value Units Date/Time    CULTURE, RESPIRATORY/SPUTUM/BRONCH Farhana Church [[id number]]     Order Status: Sent Specimen: Sputum     CULTURE, BLOOD [[id number]] Collected: 06/10/21 1825    Order Status: Completed Specimen: Blood Updated: 06/11/21 0701     Special Requests: RAC     Culture result: NO GROWTH AFTER 10 HOURS       CULTURE, BLOOD [[id number]] Collected: 06/10/21 1825    Order Status: Completed Specimen: Blood Updated: 06/11/21 0701     Special Requests: LAC     Culture result: NO GROWTH AFTER 10 HOURS             EKG Results     Procedure 720 Value Units Date/Time    EKG, 12 LEAD, INITIAL [[id number]] Collected: 06/10/21 1954    Order Status: Completed Updated: 06/10/21 2052     Ventricular Rate 96 BPM      Atrial Rate 96 BPM      P-R Interval 196 ms      QRS Duration 150 ms      Q-T Interval 410 ms      QTC Calculation (Bezet) 517 ms Calculated P Axis 86 degrees      Calculated R Axis -54 degrees      Calculated T Axis 105 degrees      Diagnosis --       Sinus rhythm with sinus arrhythmia with occasional Premature ventricular   complexes  Biatrial enlargement  Non-specific intra-ventricular conduction block  Abnormal ECG  When compared with ECG of 10-NICHOLE-2021 17:32,  Premature supraventricular complexes are no longer Present  Serial changes of Lateral infarct Present  Confirmed by Nikole Santos ([id number]) on 6/10/2021 8:52:09 PM      EKG [[id number]] Collected: 06/10/21 1732    Order Status: Completed Updated: 06/10/21 1807     Ventricular Rate 89 BPM      Atrial Rate 89 BPM      P-R Interval 198 ms      QRS Duration 152 ms      Q-T Interval 450 ms      QTC Calculation (Bezet) 547 ms      Calculated P Axis 82 degrees      Calculated R Axis -64 degrees      Calculated T Axis 101 degrees      Diagnosis --     !! AGE AND GENDER SPECIFIC ECG ANALYSIS !! Sinus rhythm with Premature supraventricular complexes with occasional   Premature ventricular complexes  Biatrial enlargement  Non-specific intra-ventricular conduction block  anterior infarct, age indeterminant  Abnormal ECG  No previous ECGs available  Confirmed by [facility]  MD ()ELLIS ([id number]) on 6/10/2021 6:07:12 PM            Other Studies:  XR CHEST PORT    Result Date: 6/10/2021  Portable chest x-ray CLINICAL INDICATION: Syncope FINDINGS: Single AP view of the chest submitted without comparison show the lungs to be expanded and clear. No pleural effusion or pneumothorax. The cardiac silhouette and mediastinum are unremarkable. The bones are normal.     Unremarkable portable chest x-ray. No acute abnormality evident. CT CHEST PULMONARY EMBOLISM    Result Date: 6/10/2021  CT OF THE CHEST WITH CONTRAST, PULMONARY EMBOLUS PROTOCOL.  CLINICAL INDICATION: Shortness of breath for two days; productive cough, hypoxia PROCEDURE: Serial thin section axial images are obtained from the thoracic inlet through the upper abdomen following the administration of intravenous contrast per a dedicated, institutional pulmonary embolus protocol. Coronal MIP reformatted images are generated. Radiation dose reduction techniques were used for this study. Our CT scanners use one or all of the following: Automated exposure control, adjusted of the mA and/or kV according to patient size, iterative reconstruction COMPARISON: No prior FINDINGS: There is adequate opacification of the central pulmonary arterial tree. No central intraluminal filling defect noted to indicate acute pulmonary embolus. No mediastinal or axillary adenopathy evident. There is no pneumothorax. No airspace consolidation noted. There is no pleural or pericardial effusion. There is no mediastinal or axillary adenopathy. There is a defect in the posterior wall of the trachea in the upper mediastinum at the thoracic inlet with extension of air into the paratracheal soft tissues in keeping with a small pneumomediastinum. There is obstruction of the lobar bronchi to the bilateral lower lobes with intrabronchial substance presumably mucus impaction Limited evaluation of the upper abdomen shows a nodular morphology to the liver. Cirrhosis could be considered. No aggressive osseous lesions identified. 1. No acute pulmonary embolus. 2. Defect or rupture of the posterior tracheal wall near the thoracic inlet with air extending into the upper mediastinal soft tissues indicative of pneumomediastinum. 3. No acute abnormality noted in the lungs. 4. Nodular morphology to the liver can be seen with cirrhosis. 5. Obstruction of the bilateral lower lobar bronchi likely mucus impaction. Aspirated fluid is not excluded.        Current Meds:   Current Facility-Administered Medications   Medication Dose Route Frequency    acetaminophen (TYLENOL) tablet 650 mg  650 mg Oral Q6H PRN    Or    acetaminophen (TYLENOL) suppository 650 mg  650 mg Rectal Q6H PRN    polyethylene glycol (MIRALAX) packet 17 g  17 g Oral DAILY PRN    ondansetron (ZOFRAN ODT) tablet 4 mg  4 mg Oral Q8H PRN    Or    ondansetron (ZOFRAN) injection 4 mg  4 mg IntraVENous Q6H PRN    0.9% sodium chloride infusion  50 mL/hr IntraVENous CONTINUOUS    insulin lispro (HUMALOG) injection   SubCUTAneous AC&HS    piperacillin-tazobactam (ZOSYN) 3.375 g in 0.9% sodium chloride (MBP/ADV) 100 mL MBP  3.375 g IntraVENous Q8H    metoprolol tartrate (LOPRESSOR) tablet 25 mg  25 mg Oral Q12H    aspirin delayed-release tablet 81 mg  81 mg Oral DAILY    LORazepam (ATIVAN) injection 1 mg  1 mg IntraVENous Q6H PRN    insulin glargine (LANTUS) injection 35 Units  35 Units SubCUTAneous QHS    nicotine (NICODERM CQ) 21 mg/24 hr patch 1 Patch  1 Patch TransDERmal Q24H    albuterol (PROVENTIL VENTOLIN) nebulizer solution 2.5 mg  2.5 mg Nebulization Q4H PRN    budesonide (PULMICORT) 500 mcg/2 ml nebulizer suspension  500 mcg Nebulization BID RT    methylPREDNISolone (PF) (SOLU-MEDROL) injection 60 mg  60 mg IntraVENous Q6H    albuterol (PROVENTIL VENTOLIN) nebulizer solution 2.5 mg  2.5 mg Nebulization Q4H RT    sodium chloride (NS) flush 5-10 mL  5-10 mL IntraVENous Q8H    sodium chloride (NS) flush 5-10 mL  5-10 mL IntraVENous PRN       Problem List:  Hospital Problems as of 6/11/2021 Date Reviewed: 6/11/2021        Codes Class Noted - Resolved POA    COPD exacerbation ([facility] ICD-10-CM: J44.1  ICD-9-CM: 491.21  6/11/2021 - Present Unknown        * (Principal) Pneumomediastinum ([facility] 75.) ICD-10-CM: J98.2  ICD-9-CM: 518.1  6/10/2021 - Present Unknown        Substance abuse ([facility] 75.) (Chronic) ICD-10-CM: F19.10  ICD-9-CM: 305.90  6/10/2021 - Present Unknown        Acute hyponatremia ICD-10-CM: E87.1  ICD-9-CM: 276.1  6/10/2021 - Present Unknown        Uncontrolled type II diabetes mellitus (HCC) (Chronic) ICD-10-CM: E11.65  ICD-9-CM: 250.02  6/10/2021 - Present Unknown        Dehydration ICD-10-CM: E86.0  ICD-9-CM: 276.51  6/10/2021 - Present Unknown                   Part of this note was written by using a voice dictation software and the note has been proof read but may still contain some grammatical/other typographical errors.     Signed By: Marleny Reynoso MD   [facility] Hospitalist Service    June 11, 2021  3:16 PM

## 2021-06-12 LAB
ALBUMIN SERPL-MCNC: 1.9 G/DL (ref 3.5–5)
ALBUMIN/GLOB SERPL: 0.4 {RATIO} (ref 1.2–3.5)
ALP SERPL-CCNC: 133 U/L (ref 50–136)
ALT SERPL-CCNC: 14 U/L (ref 12–65)
ANION GAP SERPL CALC-SCNC: 10 MMOL/L (ref 7–16)
AST SERPL-CCNC: 15 U/L (ref 15–37)
BASOPHILS # BLD: 0 K/UL (ref 0–0.2)
BASOPHILS NFR BLD: 0 % (ref 0–2)
BILIRUB SERPL-MCNC: 0.8 MG/DL (ref 0.2–1.1)
BUN SERPL-MCNC: 22 MG/DL (ref 6–23)
CALCIUM SERPL-MCNC: 8.8 MG/DL (ref 8.3–10.4)
CHLORIDE SERPL-SCNC: 94 MMOL/L (ref 98–107)
CO2 SERPL-SCNC: 31 MMOL/L (ref 21–32)
CREAT SERPL-MCNC: 0.77 MG/DL (ref 0.8–1.5)
DIFFERENTIAL METHOD BLD: ABNORMAL
EOSINOPHIL # BLD: 0.2 K/UL (ref 0–0.8)
EOSINOPHIL NFR BLD: 3 % (ref 0.5–7.8)
ERYTHROCYTE [DISTWIDTH] IN BLOOD BY AUTOMATED COUNT: 12.9 % (ref 11.9–14.6)
GLOBULIN SER CALC-MCNC: 5 G/DL (ref 2.3–3.5)
GLUCOSE BLD STRIP.AUTO-MCNC: 251 MG/DL (ref 65–100)
GLUCOSE BLD STRIP.AUTO-MCNC: 318 MG/DL (ref 65–100)
GLUCOSE BLD STRIP.AUTO-MCNC: 327 MG/DL (ref 65–100)
GLUCOSE BLD STRIP.AUTO-MCNC: 377 MG/DL (ref 65–100)
GLUCOSE SERPL-MCNC: 279 MG/DL (ref 65–100)
HCT VFR BLD AUTO: 46.1 % (ref 41.1–50.3)
HGB BLD-MCNC: 15.5 G/DL (ref 13.6–17.2)
IMM GRANULOCYTES # BLD AUTO: 0 K/UL (ref 0–0.5)
IMM GRANULOCYTES NFR BLD AUTO: 1 % (ref 0–5)
LYMPHOCYTES # BLD: 0.9 K/UL (ref 0.5–4.6)
LYMPHOCYTES NFR BLD: 10 % (ref 13–44)
MCH RBC QN AUTO: 27 PG (ref 26.1–32.9)
MCHC RBC AUTO-ENTMCNC: 33.6 G/DL (ref 31.4–35)
MCV RBC AUTO: 80.3 FL (ref 79.6–97.8)
MONOCYTES # BLD: 0.2 K/UL (ref 0.1–1.3)
MONOCYTES NFR BLD: 3 % (ref 4–12)
NEUTS SEG # BLD: 7 K/UL (ref 1.7–8.2)
NEUTS SEG NFR BLD: 84 % (ref 43–78)
NRBC # BLD: 0 K/UL (ref 0–0.2)
PLATELET # BLD AUTO: 175 K/UL (ref 150–450)
PMV BLD AUTO: 10.3 FL (ref 9.4–12.3)
POTASSIUM SERPL-SCNC: 3.5 MMOL/L (ref 3.5–5.1)
PROT SERPL-MCNC: 6.9 G/DL (ref 6.3–8.2)
RBC # BLD AUTO: 5.74 M/UL (ref 4.23–5.6)
SERVICE CMNT-IMP: ABNORMAL
SODIUM SERPL-SCNC: 135 MMOL/L (ref 136–145)
WBC # BLD AUTO: 8.3 K/UL (ref 4.3–11.1)

## 2021-06-12 PROCEDURE — 74011000258 HC RX REV CODE- 258: Performed by: HOSPITALIST

## 2021-06-12 PROCEDURE — 74011250637 HC RX REV CODE- 250/637: Performed by: HOSPITALIST

## 2021-06-12 PROCEDURE — 80053 COMPREHEN METABOLIC PANEL: CPT

## 2021-06-12 PROCEDURE — 74011636637 HC RX REV CODE- 636/637: Performed by: INTERNAL MEDICINE

## 2021-06-12 PROCEDURE — 36415 COLL VENOUS BLD VENIPUNCTURE: CPT

## 2021-06-12 PROCEDURE — 74011000250 HC RX REV CODE- 250: Performed by: INTERNAL MEDICINE

## 2021-06-12 PROCEDURE — 94760 N-INVAS EAR/PLS OXIMETRY 1: CPT

## 2021-06-12 PROCEDURE — 74011250636 HC RX REV CODE- 250/636: Performed by: INTERNAL MEDICINE

## 2021-06-12 PROCEDURE — 94640 AIRWAY INHALATION TREATMENT: CPT

## 2021-06-12 PROCEDURE — 65660000000 HC RM CCU STEPDOWN

## 2021-06-12 PROCEDURE — 74011250636 HC RX REV CODE- 250/636: Performed by: HOSPITALIST

## 2021-06-12 PROCEDURE — 74011000250 HC RX REV CODE- 250: Performed by: NURSE PRACTITIONER

## 2021-06-12 PROCEDURE — 85025 COMPLETE CBC W/AUTO DIFF WBC: CPT

## 2021-06-12 PROCEDURE — 99232 SBSQ HOSP IP/OBS MODERATE 35: CPT | Performed by: INTERNAL MEDICINE

## 2021-06-12 PROCEDURE — 74011250637 HC RX REV CODE- 250/637: Performed by: NURSE PRACTITIONER

## 2021-06-12 PROCEDURE — 82962 GLUCOSE BLOOD TEST: CPT

## 2021-06-12 PROCEDURE — 74011636637 HC RX REV CODE- 636/637: Performed by: HOSPITALIST

## 2021-06-12 PROCEDURE — 74011250637 HC RX REV CODE- 250/637: Performed by: INTERNAL MEDICINE

## 2021-06-12 PROCEDURE — 77010033678 HC OXYGEN DAILY

## 2021-06-12 RX ORDER — INSULIN GLARGINE 100 [IU]/ML
50 INJECTION, SOLUTION SUBCUTANEOUS
Status: DISCONTINUED | OUTPATIENT
Start: 2021-06-12 | End: 2021-06-13

## 2021-06-12 RX ORDER — INSULIN GLARGINE 100 [IU]/ML
40 INJECTION, SOLUTION SUBCUTANEOUS
Status: DISCONTINUED | OUTPATIENT
Start: 2021-06-12 | End: 2021-06-12

## 2021-06-12 RX ORDER — ACETYLCYSTEINE 100 MG/ML
4 SOLUTION ORAL; RESPIRATORY (INHALATION)
Status: DISCONTINUED | OUTPATIENT
Start: 2021-06-12 | End: 2021-06-14 | Stop reason: RX

## 2021-06-12 RX ORDER — POTASSIUM CHLORIDE 20 MEQ/1
40 TABLET, EXTENDED RELEASE ORAL
Status: COMPLETED | OUTPATIENT
Start: 2021-06-12 | End: 2021-06-12

## 2021-06-12 RX ADMIN — SODIUM CHLORIDE 10 ML: 9 INJECTION, SOLUTION INTRAMUSCULAR; INTRAVENOUS; SUBCUTANEOUS at 14:17

## 2021-06-12 RX ADMIN — SODIUM CHLORIDE 10 ML: 9 INJECTION, SOLUTION INTRAMUSCULAR; INTRAVENOUS; SUBCUTANEOUS at 21:46

## 2021-06-12 RX ADMIN — METHYLPREDNISOLONE SODIUM SUCCINATE 60 MG: 125 INJECTION, POWDER, FOR SOLUTION INTRAMUSCULAR; INTRAVENOUS at 14:16

## 2021-06-12 RX ADMIN — ASPIRIN 81 MG: 81 TABLET ORAL at 08:22

## 2021-06-12 RX ADMIN — SODIUM CHLORIDE 50 ML/HR: 900 INJECTION, SOLUTION INTRAVENOUS at 08:21

## 2021-06-12 RX ADMIN — PIPERACILLIN SODIUM AND TAZOBACTAM SODIUM 3.38 G: 3; .375 INJECTION, POWDER, LYOPHILIZED, FOR SOLUTION INTRAVENOUS at 14:16

## 2021-06-12 RX ADMIN — BUDESONIDE 500 MCG: 0.5 INHALANT RESPIRATORY (INHALATION) at 20:21

## 2021-06-12 RX ADMIN — POTASSIUM CHLORIDE 40 MEQ: 20 TABLET, EXTENDED RELEASE ORAL at 11:54

## 2021-06-12 RX ADMIN — METHYLPREDNISOLONE SODIUM SUCCINATE 60 MG: 40 INJECTION, POWDER, FOR SOLUTION INTRAMUSCULAR; INTRAVENOUS at 06:10

## 2021-06-12 RX ADMIN — ALBUTEROL SULFATE 2.5 MG: 2.5 SOLUTION RESPIRATORY (INHALATION) at 15:20

## 2021-06-12 RX ADMIN — METOPROLOL TARTRATE 25 MG: 25 TABLET, FILM COATED ORAL at 08:22

## 2021-06-12 RX ADMIN — METHYLPREDNISOLONE SODIUM SUCCINATE 60 MG: 125 INJECTION, POWDER, FOR SOLUTION INTRAMUSCULAR; INTRAVENOUS at 21:46

## 2021-06-12 RX ADMIN — ALBUTEROL SULFATE 2.5 MG: 2.5 SOLUTION RESPIRATORY (INHALATION) at 07:55

## 2021-06-12 RX ADMIN — PIPERACILLIN SODIUM AND TAZOBACTAM SODIUM 3.38 G: 3; .375 INJECTION, POWDER, LYOPHILIZED, FOR SOLUTION INTRAVENOUS at 21:47

## 2021-06-12 RX ADMIN — ALBUTEROL SULFATE 2.5 MG: 2.5 SOLUTION RESPIRATORY (INHALATION) at 17:42

## 2021-06-12 RX ADMIN — METOPROLOL TARTRATE 25 MG: 25 TABLET, FILM COATED ORAL at 21:46

## 2021-06-12 RX ADMIN — PIPERACILLIN SODIUM AND TAZOBACTAM SODIUM 3.38 G: 3; .375 INJECTION, POWDER, LYOPHILIZED, FOR SOLUTION INTRAVENOUS at 06:11

## 2021-06-12 RX ADMIN — INSULIN LISPRO 15 UNITS: 100 INJECTION, SOLUTION INTRAVENOUS; SUBCUTANEOUS at 21:49

## 2021-06-12 RX ADMIN — INSULIN GLARGINE 50 UNITS: 100 INJECTION, SOLUTION SUBCUTANEOUS at 22:00

## 2021-06-12 RX ADMIN — INSULIN LISPRO 9 UNITS: 100 INJECTION, SOLUTION INTRAVENOUS; SUBCUTANEOUS at 06:10

## 2021-06-12 RX ADMIN — ALBUTEROL SULFATE 2.5 MG: 2.5 SOLUTION RESPIRATORY (INHALATION) at 01:10

## 2021-06-12 RX ADMIN — ALBUTEROL SULFATE 2.5 MG: 2.5 SOLUTION RESPIRATORY (INHALATION) at 11:20

## 2021-06-12 RX ADMIN — ALBUTEROL SULFATE 2.5 MG: 2.5 SOLUTION RESPIRATORY (INHALATION) at 04:45

## 2021-06-12 RX ADMIN — BUDESONIDE 500 MCG: 0.5 INHALANT RESPIRATORY (INHALATION) at 07:55

## 2021-06-12 RX ADMIN — INSULIN LISPRO 12 UNITS: 100 INJECTION, SOLUTION INTRAVENOUS; SUBCUTANEOUS at 17:14

## 2021-06-12 RX ADMIN — INSULIN LISPRO 12 UNITS: 100 INJECTION, SOLUTION INTRAVENOUS; SUBCUTANEOUS at 11:53

## 2021-06-12 RX ADMIN — SODIUM CHLORIDE 10 ML: 9 INJECTION, SOLUTION INTRAMUSCULAR; INTRAVENOUS; SUBCUTANEOUS at 06:11

## 2021-06-12 RX ADMIN — ALBUTEROL SULFATE 2.5 MG: 2.5 SOLUTION RESPIRATORY (INHALATION) at 20:21

## 2021-06-12 RX ADMIN — METHYLPREDNISOLONE SODIUM SUCCINATE 60 MG: 40 INJECTION, POWDER, FOR SOLUTION INTRAMUSCULAR; INTRAVENOUS at 00:53

## 2021-06-12 NOTE — PROGRESS NOTES
[facility]ist Progress Note     Name:  Octavia May  Age:55 y.o. Sex:male   :  1965    MRN:  [id number]     Admit Date:  6/10/2021    Reason for Admission:  Pneumomediastinum [facility]) [J98.2]    Assessment & Plan       Acute respiratory failure with COPD exacerbation, pneumonia, possible tracheal defect with pneumomediastinum:  · Wean O2 as tolerant, on 3 L NC  · D2 zosyn  · IV steroids  · Scheduled nebs, pulmicort  · Possible bronch Monday  · appreciate pulmonary  · Needs smoking cessation       Dm2:  · SSI  · Increase  lantus        Hyponatremia:  · Corrected for hyperglycemia 135  · Trend BMP-improved       VTACH:  · followup remote tele   · Recheck potassium/ magnesium  · Continued metoprolol       Elevated LFTS, HEPC:  · Trend LFTS      Hypokalemia:  · Replace and repeat lab        Diet:  DIET ADULT  DIET NPO  DVT PPx: SCD  GI Ppx:  none  Code: Full Code    Dispo/Discharge Planning: pending     Hospital Course/Subjective:       Mr. Kyle Monreal is a 62 yo male PMH of COPD, DM2, HTN, tobacco use, methamphetamine use, HEPC, HFrEF, VTACH admitted with COPD exacerbation. CTA chest shows defect or rupture of posterior tracheal wall near thoracic inlet with air into the upper mediastinum and soft tissues, obstruction of bilateral lower bronchi. He has been seen by pulmonary and placed on nebs, pulmicort, IV steroids and zosyn. BC pending. Plans are for bronch when respiratory status has improved.        Discharge plans pending         Subjective/24 hr Events (21):    Eating ok, had BM, less short of breath, some cough,     Objective:     Patient Vitals for the past 24 hrs:   Temp Pulse Resp BP SpO2   21 1122     96 %   21 1110 98 °F (36.7 °C) 79 18 136/77 95 %   21 0755     94 %   21 0747 97.9 °F (36.6 °C) 74 18 122/62 94 %   21 0451     96 %   21 0402 97.8 °F (36.6 °C) 62 18 114/67 98 %   21 0110     97 %   21 2249 98.1 °F (36.7 °C) 72 22 (!) 145/62 97 %   06/11/21 2154  80  (!) 159/61    06/11/21 2055     96 %   06/11/21 1926 98 °F (36.7 °C) 68 22 134/86 96 %   06/11/21 1551     97 %   06/11/21 1535 97.4 °F (36.3 °C) 96 20 (!) 144/72 96 %     Oxygen Therapy  O2 Sat (%): 96 % (06/12/21 1122)  Pulse via Oximetry: 88 beats per minute (06/12/21 1122)  O2 Device: Nasal cannula (06/12/21 1122)  Skin Assessment: Clean, dry, & intact (06/11/21 1056)  O2 Flow Rate (L/min): 3 l/min (06/12/21 1122)    Body mass index is 22.45 kg/m². Physical Exam:   General:     Alert, no distress    Lungs:   Coarse   Cardiac:   RRR, Normal S1 and S2. No S3, S4 or murmurs.   No edema   Abdomen:   Soft, non distended, tender mid epigastric region, +BS, no guarding/rebound  Neuro:   nonfocal        Data Review:  I have reviewed all labs, meds, and studies from the last 24 hours:    Labs:  Recent Results (from the past 24 hour(s))   GLUCOSE, POC    Collection Time: 06/11/21  4:03 PM   Result Value Ref Range    Glucose (POC) 340 (H) 65 - 100 mg/dL    Performed by [facility]    METABOLIC PANEL, BASIC    Collection Time: 06/11/21  5:14 PM   Result Value Ref Range    Sodium 132 (L) 136 - 145 mmol/L    Potassium 4.1 3.5 - 5.1 mmol/L    Chloride 93 (L) 98 - 107 mmol/L    CO2 32 21 - 32 mmol/L    Anion gap 7 7 - 16 mmol/L    Glucose 348 (H) 65 - 100 mg/dL    BUN 24 (H) 6 - 23 MG/DL    Creatinine 0.73 (L) 0.8 - 1.5 MG/DL    GFR est AA >60 >60 ml/min/1.73m2    GFR est non-AA >60 >60 ml/min/1.73m2    Calcium 8.5 8.3 - 10.4 MG/DL   MAGNESIUM    Collection Time: 06/11/21  5:14 PM   Result Value Ref Range    Magnesium 2.2 1.8 - 2.4 mg/dL   GLUCOSE, POC    Collection Time: 06/11/21  8:43 PM   Result Value Ref Range    Glucose (POC) 345 (H) 65 - 100 mg/dL    [facility]    GLUCOSE, POC    Collection Time: 06/12/21  5:45 AM   Result Value Ref Range    Glucose (POC) 251 (H) 65 - 100 mg/dL    Performed by [facility]    METABOLIC PANEL, COMPREHENSIVE Collection Time: 06/12/21  8:29 AM   Result Value Ref Range    Sodium 135 (L) 136 - 145 mmol/L    Potassium 3.5 3.5 - 5.1 mmol/L    Chloride 94 (L) 98 - 107 mmol/L    CO2 31 21 - 32 mmol/L    Anion gap 10 7 - 16 mmol/L    Glucose 279 (H) 65 - 100 mg/dL    BUN 22 6 - 23 MG/DL    Creatinine 0.77 (L) 0.8 - 1.5 MG/DL    GFR est AA >60 >60 ml/min/1.73m2    GFR est non-AA >60 >60 ml/min/1.73m2    Calcium 8.8 8.3 - 10.4 MG/DL    Bilirubin, total 0.8 0.2 - 1.1 MG/DL    ALT (SGPT) 14 12 - 65 U/L    AST (SGOT) 15 15 - 37 U/L    Alk. phosphatase 133 50 - 136 U/L    Protein, total 6.9 6.3 - 8.2 g/dL    Albumin 1.9 (L) 3.5 - 5.0 g/dL    Globulin 5.0 (H) 2.3 - 3.5 g/dL    A-G Ratio 0.4 (L) 1.2 - 3.5     CBC WITH AUTOMATED DIFF    Collection Time: 06/12/21  8:29 AM   Result Value Ref Range    WBC 8.3 4.3 - 11.1 K/uL    RBC 5.74 (H) 4.23 - 5.6 M/uL    HGB 15.5 13.6 - 17.2 g/dL    HCT 46.1 41.1 - 50.3 %    MCV 80.3 79.6 - 97.8 FL    MCH 27.0 26.1 - 32.9 PG    MCHC 33.6 31.4 - 35.0 g/dL    RDW 12.9 11.9 - 14.6 %    PLATELET 497 188 - 578 K/uL    MPV 10.3 9.4 - 12.3 FL    ABSOLUTE NRBC 0.00 0.0 - 0.2 K/uL    DF AUTOMATED      NEUTROPHILS 84 (H) 43 - 78 %    LYMPHOCYTES 10 (L) 13 - 44 %    MONOCYTES 3 (L) 4.0 - 12.0 %    EOSINOPHILS 3 0.5 - 7.8 %    BASOPHILS 0 0.0 - 2.0 %    IMMATURE GRANULOCYTES 1 0.0 - 5.0 %    ABS. NEUTROPHILS 7.0 1.7 - 8.2 K/UL    ABS. LYMPHOCYTES 0.9 0.5 - 4.6 K/UL    ABS. MONOCYTES 0.2 0.1 - 1.3 K/UL    ABS. EOSINOPHILS 0.2 0.0 - 0.8 K/UL    ABS. BASOPHILS 0.0 0.0 - 0.2 K/UL    ABS. IMM.  GRANS. 0.0 0.0 - 0.5 K/UL   GLUCOSE, POC    Collection Time: 06/12/21 11:35 AM   Result Value Ref Range    Glucose (POC) 318 (H) 65 - 100 mg/dL    Performed by [facility]        All Micro Results     Procedure Component Value Units Date/Time    CULTURE, BLOOD [[id number]] Collected: 06/10/21 1825    Order Status: Completed Specimen: Blood Updated: 06/12/21 1126     Special Requests: LAC     Culture result: NO GROWTH 2 DAYS CULTURE, BLOOD [[id number]] Collected: 06/10/21 1825    Order Status: Completed Specimen: Blood Updated: 06/12/21 1126     Special Requests: RAC     Culture result: NO GROWTH 2 DAYS       CULTURE, RESPIRATORY/SPUTUM/BRONCH [place] STAIN [[id number]]     Order Status: Sent Specimen: Sputum           EKG Results     Procedure 720 Value Units Date/Time    EKG, 12 LEAD, INITIAL [[id number]] Collected: 06/10/21 1954    Order Status: Completed Updated: 06/10/21 2052     Ventricular Rate 96 BPM      Atrial Rate 96 BPM      P-R Interval 196 ms      QRS Duration 150 ms      Q-T Interval 410 ms      QTC Calculation (Bezet) 517 ms      Calculated P Axis 86 degrees      Calculated R Axis -54 degrees      Calculated T Axis 105 degrees      Diagnosis --       Sinus rhythm with sinus arrhythmia with occasional Premature ventricular   complexes  Biatrial enlargement  Non-specific intra-ventricular conduction block  Abnormal ECG  When compared with ECG of 10-NICHOLE-2021 17:32,  Premature supraventricular complexes are no longer Present  Serial changes of Lateral infarct Present  Confirmed by Reyes Blalock ([id number]) on 6/10/2021 8:52:09 PM      EKG [[id number]] Collected: 06/10/21 1732    Order Status: Completed Updated: 06/10/21 1807     Ventricular Rate 89 BPM      Atrial Rate 89 BPM      P-R Interval 198 ms      QRS Duration 152 ms      Q-T Interval 450 ms      QTC Calculation (Bezet) 547 ms      Calculated P Axis 82 degrees      Calculated R Axis -64 degrees      Calculated T Axis 101 degrees      Diagnosis --     !! AGE AND GENDER SPECIFIC ECG ANALYSIS !!   Sinus rhythm with Premature supraventricular complexes with occasional   Premature ventricular complexes  Biatrial enlargement  Non-specific intra-ventricular conduction block  anterior infarct, age indeterminant  Abnormal ECG  No previous ECGs available  Confirmed by Priscilla De Los Santos MD (), ELLIS RIZVI ([id number]) on 6/10/2021 6:07:12 PM            Other Studies:  No results found.    Current Meds:   Current Facility-Administered Medications   Medication Dose Route Frequency    methylPREDNISolone (PF) (SOLU-MEDROL) injection 60 mg  60 mg IntraVENous Q8H    insulin glargine (LANTUS) injection 50 Units  50 Units SubCUTAneous QHS    acetaminophen (TYLENOL) tablet 650 mg  650 mg Oral Q6H PRN    Or    acetaminophen (TYLENOL) suppository 650 mg  650 mg Rectal Q6H PRN    polyethylene glycol (MIRALAX) packet 17 g  17 g Oral DAILY PRN    ondansetron (ZOFRAN ODT) tablet 4 mg  4 mg Oral Q8H PRN    Or    ondansetron (ZOFRAN) injection 4 mg  4 mg IntraVENous Q6H PRN    insulin lispro (HUMALOG) injection   SubCUTAneous AC&HS    piperacillin-tazobactam (ZOSYN) 3.375 g in 0.9% sodium chloride (MBP/ADV) 100 mL MBP  3.375 g IntraVENous Q8H    metoprolol tartrate (LOPRESSOR) tablet 25 mg  25 mg Oral Q12H    aspirin delayed-release tablet 81 mg  81 mg Oral DAILY    LORazepam (ATIVAN) injection 1 mg  1 mg IntraVENous Q6H PRN    nicotine (NICODERM CQ) 21 mg/24 hr patch 1 Patch  1 Patch TransDERmal Q24H    albuterol (PROVENTIL VENTOLIN) nebulizer solution 2.5 mg  2.5 mg Nebulization Q4H PRN    budesonide (PULMICORT) 500 mcg/2 ml nebulizer suspension  500 mcg Nebulization BID RT    albuterol (PROVENTIL VENTOLIN) nebulizer solution 2.5 mg  2.5 mg Nebulization Q4H RT    sodium chloride (NS) flush 5-10 mL  5-10 mL IntraVENous Q8H    sodium chloride (NS) flush 5-10 mL  5-10 mL IntraVENous PRN       Problem List:  Hospital Problems as of 6/12/2021 Date Reviewed: 6/11/2021        Codes Class Noted - Resolved POA    COPD exacerbation ([facility] ICD-10-CM: J44.1  ICD-9-CM: 491.21  6/11/2021 - Present Unknown        * (Principal) Pneumomediastinum ([facility] ICD-10-CM: J98.2  ICD-9-CM: 518.1  6/10/2021 - Present Unknown        Substance abuse ([facility] (Chronic) ICD-10-CM: F19.10  ICD-9-CM: 305.90  6/10/2021 - Present Unknown        Acute hyponatremia ICD-10-CM: E87.1  ICD-9-CM: 276.1  6/10/2021 - Present Unknown Uncontrolled type II diabetes mellitus ([facility] (Chronic) ICD-10-CM: E11.65  ICD-9-CM: 250.02  6/10/2021 - Present Unknown        Dehydration ICD-10-CM: E86.0  ICD-9-CM: 276.51  6/10/2021 - Present Unknown                   Part of this note was written by using a voice dictation software and the note has been proof read but may still contain some grammatical/other typographical errors.     Signed By: Eulalia Muro MD   [employer] [facility]    June 12, 2021  3:16 PM

## 2021-06-12 NOTE — PROGRESS NOTES
Pt resting in bed watching tv. 3L NC in place. No s/sx of distress noted. Family at bedside. Call light within reach. Will monitor.

## 2021-06-12 NOTE — PROGRESS NOTES
Pt had an episode of shortness of breath and coughing seemed to be choking on mucous. O2 sat remained 97 to 100 the whole time. He was able to expel the mucous on his own by coughing hard. It was very thick and green/gray. He has calmed down since and caught his breath. RT called and  Assessed him. Currently recieving breathing treatment. Dr. Radha Lynch notified of event via perfect serve. Orders for mucomyst received. Will continue to monitor.

## 2021-06-12 NOTE — PROGRESS NOTES
Patient resting in bed, alert and oriented, cooperative with care. Patient on 3 liters of Oxygen via NC. IV in place patent and infusing Normal Saline at 50 ml/hr. Patient denies pain or distress,  multiple tattoos noted on both arms and right lower shin. Safety measures in place, call light within reach.

## 2021-06-12 NOTE — PROGRESS NOTES
Pt resting in bed with eyes closed. Awakens when spoken to. Alert and oriented times 3 at this time. On 3L NC. No s/sx of distress noted. Denies any pain at this time. Encouraged to call for assistance as needed. Call light within reach. Will continue to monitor.

## 2021-06-12 NOTE — PROGRESS NOTES
Marie Darby  Admission Date: 6/10/2021             Daily Progress Note: 6/12/2021    The patient's chart is reviewed and the patient is discussed with the staff. Patient is a 63 y.o.  male, PMH DM, HTN, tobacco abuse, methamphetamine abuse, Hep C positive in 2018, and cervical disc disorder, seen and evaluated at the request of Dr. Corey Mack for pneumomediastinum. The patient presented to the ED here on yesterday via EMS with c/o productive cough w/ yellow sputum and shortness of breath X 3 days. O2 sat on RA was noted to be 88%. CT Chest was performed to r/o PE which revealed no PE but did indicate pneumomediastinum and obstruction of the lower lobar bronchi, likely mucus impaction. WBC has went from 11.6 last night to 16.1 this morning. Serum glucose have ranged from high 300s to high 400s, and Lactic acid on admission was 2.9. Blood cultures X 2 are pending. The patient was started on Zosyn and is on O2 at 3L NC. The patient is very lethargic this morning and unable to stay awake long enough to answer pertinent questions. He does endorse coughing and being hoarse.     The patient was admitted to [facility] in March of this year after presenting to the ED at [facility] with c/o cough, dysphonia, and worsening dyspnea. ENT had [facility] was contacted and the patient was transferred there for ENT consultation for stridor. Laryngoscopy was performed and demonstrated nodular mass right vocal cord and thick whitish exudate overlying both vocal cords. Intubation was recommended for potential impending airway compromise. At discharge from that admission the patient was placed on Augmentin and Nystatin for vocal cord mass vs lagryngeal thrush. The patient was also noted to have heavy Haemophilus influenza growth in sputum culture. Echocardiogram during that admission also revealed EF 35-40% with septal hypokinesis and grade 1 diastolic dysfunction.   Hgb A1c was 15.0 at that time as well.       Subjective:     Currently on 3L NC with sat of 94%. Feels better today. Getting neb treatment at present. Plans for bronch on Monday reviewed.      Current Facility-Administered Medications   Medication Dose Route Frequency    acetaminophen (TYLENOL) tablet 650 mg  650 mg Oral Q6H PRN    Or    acetaminophen (TYLENOL) suppository 650 mg  650 mg Rectal Q6H PRN    polyethylene glycol (MIRALAX) packet 17 g  17 g Oral DAILY PRN    ondansetron (ZOFRAN ODT) tablet 4 mg  4 mg Oral Q8H PRN    Or    ondansetron (ZOFRAN) injection 4 mg  4 mg IntraVENous Q6H PRN    0.9% sodium chloride infusion  50 mL/hr IntraVENous CONTINUOUS    insulin lispro (HUMALOG) injection   SubCUTAneous AC&HS    piperacillin-tazobactam (ZOSYN) 3.375 g in 0.9% sodium chloride (MBP/ADV) 100 mL MBP  3.375 g IntraVENous Q8H    metoprolol tartrate (LOPRESSOR) tablet 25 mg  25 mg Oral Q12H    aspirin delayed-release tablet 81 mg  81 mg Oral DAILY    LORazepam (ATIVAN) injection 1 mg  1 mg IntraVENous Q6H PRN    insulin glargine (LANTUS) injection 35 Units  35 Units SubCUTAneous QHS    nicotine (NICODERM CQ) 21 mg/24 hr patch 1 Patch  1 Patch TransDERmal Q24H    albuterol (PROVENTIL VENTOLIN) nebulizer solution 2.5 mg  2.5 mg Nebulization Q4H PRN    budesonide (PULMICORT) 500 mcg/2 ml nebulizer suspension  500 mcg Nebulization BID RT    methylPREDNISolone (PF) (SOLU-MEDROL) injection 60 mg  60 mg IntraVENous Q6H    albuterol (PROVENTIL VENTOLIN) nebulizer solution 2.5 mg  2.5 mg Nebulization Q4H RT    sodium chloride (NS) flush 5-10 mL  5-10 mL IntraVENous Q8H    sodium chloride (NS) flush 5-10 mL  5-10 mL IntraVENous PRN       Review of Systems    Constitutional: negative for fever, chills, sweats  Cardiovascular: negative for chest pain, palpitations, syncope, edema  Gastrointestinal:  negative for dysphagia, reflux, vomiting, diarrhea, abdominal pain, or melena  Neurologic:  negative for focal weakness, numbness, headache    Objective:     Vitals:    06/12/21 0402 06/12/21 0451 06/12/21 0747 06/12/21 0755   BP: 114/67  122/62    Pulse: 62  74    Resp: 18  18    Temp: 97.8 °F (36.6 °C)  97.9 °F (36.6 °C)    SpO2: 98% 96% 94% 94%   Weight: 161 lb (73 kg)      Height:             Intake/Output Summary (Last 24 hours) at 6/12/2021 1111  Last data filed at 6/12/2021 2983  Gross per 24 hour   Intake 1320 ml   Output 1025 ml   Net 295 ml       Physical Exam:   Constitution:  the patient is well developed and in no acute distress  EENMT:  Sclera clear, pupils equal, oral mucosa moist  Respiratory: decreased BS bilaterally with a few scattered rhonchi  Cardiovascular:  RRR without M,G,R  Gastrointestinal: soft and non-tender; with positive bowel sounds. Musculoskeletal: warm without cyanosis. There is no lower extremity edema. Skin:  no jaundice or rashes  Neurologic: no gross neuro deficits     Psychiatric:  alert and oriented x 3    CXR:           LAB  Recent Labs     06/12/21  0545 06/11/21  2043 06/11/21  1603 06/11/21  1117 06/11/21  0534   GLUCPOC 251* 345* 340* 267* 392*      Recent Labs     06/12/21  0829 06/11/21  0459 06/10/21  1825   WBC 8.3 16.1* 11.6*   HGB 15.5 15.8 18.4*   HCT 46.1 46.8 54.4*    301 384     Recent Labs     06/12/21  0829 06/11/21  1714 06/11/21  0459 06/10/21  1825   * 132* 128* 121*   K 3.5 4.1 4.4 7.7*   CL 94* 93* 90* 80*   CO2 31 32 28 32   * 348* 393* 464*   BUN 22 24* 29* 31*   CREA 0.77* 0.73* 0.61* 0.97   MG  --  2.2 2.1 2.2   CA 8.8 8.5 8.4 9.3   ALB 1.9*  --   --  2.2*   TBILI 0.8  --   --  1.3*   ALT 14  --   --  23     No results for input(s): PH, PCO2, PO2, HCO3, PHI, PCO2I, PO2I, HCO3I in the last 72 hours.   Recent Labs     06/11/21  0459 06/10/21  2313 06/10/21  1825   LAC 2.0 2.9* 2.6*         Assessment:  (Medical Decision Making)     Hospital Problems  Date Reviewed: 6/11/2021        Codes Class Noted POA    COPD exacerbation ([facility] ICD-10-CM: J44.1  ICD-9-CM: 491.21  6/11/2021 Unknown    Clinically better    * (Principal) Pneumomediastinum ([facility] ICD-10-CM: J98.2  ICD-9-CM: 518.1  6/10/2021 Unknown    Follow. Bronch on Monday for inspection of possible airway tear    Substance abuse ([facility] (Chronic) ICD-10-CM: F19.10  ICD-9-CM: 305.90  6/10/2021 Unknown        Acute hyponatremia ICD-10-CM: E87.1  ICD-9-CM: 276.1  6/10/2021 Unknown    Better    Uncontrolled type II diabetes mellitus ([facility] (Chronic) ICD-10-CM: E11.65  ICD-9-CM: 250.02  6/10/2021 Unknown    Steroids worsening glycemic control. Dehydration ICD-10-CM: E86.0  ICD-9-CM: 276.51  6/10/2021 Unknown              Plan:  (Medical Decision Making)     Decrease SM to q8h. Increase lantus to 50U qHS. Add mucinex. Continue nebs. Bronch Monday. --    More than 50% of the time documented was spent in face-to-face contact with the patient and in the care of the patient on the floor/unit where the patient is located.     Melina Chapman MD

## 2021-06-13 LAB
GLUCOSE BLD STRIP.AUTO-MCNC: 276 MG/DL (ref 65–100)
GLUCOSE BLD STRIP.AUTO-MCNC: 300 MG/DL (ref 65–100)
GLUCOSE BLD STRIP.AUTO-MCNC: 315 MG/DL (ref 65–100)
GLUCOSE BLD STRIP.AUTO-MCNC: 343 MG/DL (ref 65–100)
GLUCOSE BLD STRIP.AUTO-MCNC: 410 MG/DL (ref 65–100)
SERVICE CMNT-IMP: ABNORMAL

## 2021-06-13 PROCEDURE — 74011250636 HC RX REV CODE- 250/636: Performed by: HOSPITALIST

## 2021-06-13 PROCEDURE — 74011000250 HC RX REV CODE- 250: Performed by: NURSE PRACTITIONER

## 2021-06-13 PROCEDURE — 77010033678 HC OXYGEN DAILY

## 2021-06-13 PROCEDURE — 74011250637 HC RX REV CODE- 250/637: Performed by: HOSPITALIST

## 2021-06-13 PROCEDURE — 74011636637 HC RX REV CODE- 636/637: Performed by: HOSPITALIST

## 2021-06-13 PROCEDURE — 74011250637 HC RX REV CODE- 250/637: Performed by: INTERNAL MEDICINE

## 2021-06-13 PROCEDURE — 94760 N-INVAS EAR/PLS OXIMETRY 1: CPT

## 2021-06-13 PROCEDURE — 94640 AIRWAY INHALATION TREATMENT: CPT

## 2021-06-13 PROCEDURE — 74011250636 HC RX REV CODE- 250/636: Performed by: INTERNAL MEDICINE

## 2021-06-13 PROCEDURE — 74011250637 HC RX REV CODE- 250/637: Performed by: NURSE PRACTITIONER

## 2021-06-13 PROCEDURE — 74011636637 HC RX REV CODE- 636/637: Performed by: INTERNAL MEDICINE

## 2021-06-13 PROCEDURE — 65660000000 HC RM CCU STEPDOWN

## 2021-06-13 PROCEDURE — 74011000250 HC RX REV CODE- 250: Performed by: INTERNAL MEDICINE

## 2021-06-13 PROCEDURE — 74011000250 HC RX REV CODE- 250: Performed by: HOSPITALIST

## 2021-06-13 PROCEDURE — 74011000258 HC RX REV CODE- 258: Performed by: HOSPITALIST

## 2021-06-13 PROCEDURE — 82962 GLUCOSE BLOOD TEST: CPT

## 2021-06-13 PROCEDURE — 99232 SBSQ HOSP IP/OBS MODERATE 35: CPT | Performed by: INTERNAL MEDICINE

## 2021-06-13 RX ORDER — INSULIN GLARGINE 100 [IU]/ML
20 INJECTION, SOLUTION SUBCUTANEOUS
Status: DISCONTINUED | OUTPATIENT
Start: 2021-06-13 | End: 2021-06-16

## 2021-06-13 RX ORDER — POTASSIUM CHLORIDE 20 MEQ/1
20 TABLET, EXTENDED RELEASE ORAL
Status: COMPLETED | OUTPATIENT
Start: 2021-06-13 | End: 2021-06-13

## 2021-06-13 RX ADMIN — METOPROLOL TARTRATE 25 MG: 25 TABLET, FILM COATED ORAL at 10:07

## 2021-06-13 RX ADMIN — ACETYLCYSTEINE 400 MG: 100 SOLUTION ORAL; RESPIRATORY (INHALATION) at 20:20

## 2021-06-13 RX ADMIN — ALBUTEROL SULFATE 2.5 MG: 2.5 SOLUTION RESPIRATORY (INHALATION) at 07:45

## 2021-06-13 RX ADMIN — ALBUTEROL SULFATE 2.5 MG: 2.5 SOLUTION RESPIRATORY (INHALATION) at 12:17

## 2021-06-13 RX ADMIN — ASPIRIN 81 MG: 81 TABLET ORAL at 10:07

## 2021-06-13 RX ADMIN — INSULIN LISPRO 15 UNITS: 100 INJECTION, SOLUTION INTRAVENOUS; SUBCUTANEOUS at 22:46

## 2021-06-13 RX ADMIN — ACETYLCYSTEINE 400 MG: 100 SOLUTION ORAL; RESPIRATORY (INHALATION) at 01:00

## 2021-06-13 RX ADMIN — BUDESONIDE 500 MCG: 0.5 INHALANT RESPIRATORY (INHALATION) at 20:20

## 2021-06-13 RX ADMIN — SODIUM CHLORIDE 10 ML: 9 INJECTION, SOLUTION INTRAMUSCULAR; INTRAVENOUS; SUBCUTANEOUS at 13:24

## 2021-06-13 RX ADMIN — METOPROLOL TARTRATE 25 MG: 25 TABLET, FILM COATED ORAL at 21:15

## 2021-06-13 RX ADMIN — PIPERACILLIN SODIUM AND TAZOBACTAM SODIUM 3.38 G: 3; .375 INJECTION, POWDER, LYOPHILIZED, FOR SOLUTION INTRAVENOUS at 21:15

## 2021-06-13 RX ADMIN — BUDESONIDE 500 MCG: 0.5 INHALANT RESPIRATORY (INHALATION) at 07:45

## 2021-06-13 RX ADMIN — SODIUM CHLORIDE 10 ML: 9 INJECTION, SOLUTION INTRAMUSCULAR; INTRAVENOUS; SUBCUTANEOUS at 21:15

## 2021-06-13 RX ADMIN — INSULIN GLARGINE 10 UNITS: 100 INJECTION, SOLUTION SUBCUTANEOUS at 22:46

## 2021-06-13 RX ADMIN — PIPERACILLIN SODIUM AND TAZOBACTAM SODIUM 3.38 G: 3; .375 INJECTION, POWDER, LYOPHILIZED, FOR SOLUTION INTRAVENOUS at 06:31

## 2021-06-13 RX ADMIN — INSULIN LISPRO 9 UNITS: 100 INJECTION, SOLUTION INTRAVENOUS; SUBCUTANEOUS at 06:30

## 2021-06-13 RX ADMIN — INSULIN LISPRO 12 UNITS: 100 INJECTION, SOLUTION INTRAVENOUS; SUBCUTANEOUS at 11:59

## 2021-06-13 RX ADMIN — ACETYLCYSTEINE 400 MG: 100 SOLUTION ORAL; RESPIRATORY (INHALATION) at 14:49

## 2021-06-13 RX ADMIN — ALBUTEROL SULFATE 2.5 MG: 2.5 SOLUTION RESPIRATORY (INHALATION) at 01:00

## 2021-06-13 RX ADMIN — SODIUM CHLORIDE 10 ML: 9 INJECTION, SOLUTION INTRAMUSCULAR; INTRAVENOUS; SUBCUTANEOUS at 06:36

## 2021-06-13 RX ADMIN — ALBUTEROL SULFATE 2.5 MG: 2.5 SOLUTION RESPIRATORY (INHALATION) at 20:20

## 2021-06-13 RX ADMIN — METHYLPREDNISOLONE SODIUM SUCCINATE 60 MG: 125 INJECTION, POWDER, FOR SOLUTION INTRAMUSCULAR; INTRAVENOUS at 21:15

## 2021-06-13 RX ADMIN — POTASSIUM CHLORIDE 20 MEQ: 20 TABLET, EXTENDED RELEASE ORAL at 11:59

## 2021-06-13 RX ADMIN — METHYLPREDNISOLONE SODIUM SUCCINATE 60 MG: 125 INJECTION, POWDER, FOR SOLUTION INTRAMUSCULAR; INTRAVENOUS at 06:30

## 2021-06-13 RX ADMIN — ALBUTEROL SULFATE 2.5 MG: 2.5 SOLUTION RESPIRATORY (INHALATION) at 14:49

## 2021-06-13 RX ADMIN — INSULIN LISPRO 12 UNITS: 100 INJECTION, SOLUTION INTRAVENOUS; SUBCUTANEOUS at 16:55

## 2021-06-13 RX ADMIN — ALBUTEROL SULFATE 2.5 MG: 2.5 SOLUTION RESPIRATORY (INHALATION) at 04:51

## 2021-06-13 RX ADMIN — PIPERACILLIN SODIUM AND TAZOBACTAM SODIUM 3.38 G: 3; .375 INJECTION, POWDER, LYOPHILIZED, FOR SOLUTION INTRAVENOUS at 14:48

## 2021-06-13 NOTE — PROGRESS NOTES
Patient blood sugar above normal levels see MAR for results. MD aware and ordered to monitor this patient blood sugar no new orders received. Charge nurse aware about this patient status.

## 2021-06-13 NOTE — PROGRESS NOTES
Pt resting in bed eating dinner. On 1L NC. No s/sx of distress. Denies any pain. Call light within reach. Will monitor.

## 2021-06-13 NOTE — PROGRESS NOTES
Patient blood sugar above normal level MD paged and aware. Orders to give his schedule insulin and recheck this patient insulin level  In 2 hours received. Charge nurse notified.

## 2021-06-13 NOTE — PROGRESS NOTES
Pt resting in bed with eyes closed. Awakens when spoken to. Alert and oriented times 3 at this time. On 1L NC. No s/sx of distress noted. Denies any pain at this time. Visitor in bed with patient. Encouraged to call for assistance as needed. Call light within reach. Will continue to monitor.

## 2021-06-13 NOTE — PROGRESS NOTES
Patient resting in bed, alert and oriented, cooperative with care. Patient on 1 liter of Oxygen via NC. Patient denies pain or distress, safety measures in place, call light within reach.

## 2021-06-13 NOTE — PROGRESS NOTES
INITIAL SPIRITUAL ASSESSMENT     NOTED:    No gianfranco preference    Girlfriend -  Armando Briones    Lives in [facility]    Full code    No acp on file    Exp d/c  5 days    No prior admits x 12 mos    High risk for readmission        WILL ASSESS HOW WE CAN BEST SERVE THIS FAMILY

## 2021-06-13 NOTE — PROGRESS NOTES
Elvia Darby  Admission Date: 6/10/2021             Daily Progress Note: 6/13/2021    The patient's chart is reviewed and the patient is discussed with the staff. Patient is a 63 y.o.  male, PMH DM, HTN, tobacco abuse, methamphetamine abuse, Hep C positive in 2018, and cervical disc disorder, seen and evaluated at the request of Dr. Liz Su for pneumomediastinum. The patient presented to the ED here on yesterday via EMS with c/o productive cough w/ yellow sputum and shortness of breath X 3 days. O2 sat on RA was noted to be 88%. CT Chest was performed to r/o PE which revealed no PE but did indicate pneumomediastinum and obstruction of the lower lobar bronchi, likely mucus impaction. WBC has went from 11.6 last night to 16.1 this morning. Serum glucose have ranged from high 300s to high 400s, and Lactic acid on admission was 2.9. Blood cultures X 2 are pending. The patient was started on Zosyn and is on O2 at 3L NC. The patient is very lethargic this morning and unable to stay awake long enough to answer pertinent questions. He does endorse coughing and being hoarse.     The patient was admitted to [facility] in March of this year after presenting to the ED at [facility] with c/o cough, dysphonia, and worsening dyspnea. ENT had [facility] was contacted and the patient was transferred there for ENT consultation for stridor. Laryngoscopy was performed and demonstrated nodular mass right vocal cord and thick whitish exudate overlying both vocal cords. Intubation was recommended for potential impending airway compromise. At discharge from that admission the patient was placed on Augmentin and Nystatin for vocal cord mass vs lagryngeal thrush. The patient was also noted to have heavy Haemophilus influenza growth in sputum culture. Echocardiogram during that admission also revealed EF 35-40% with septal hypokinesis and grade 1 diastolic dysfunction.   Hgb A1c was 15.0 at that time as well.       Subjective: Weaned to 1L. Sat on 2L was 100%. Looks and feels better. Slept well. BS high.          Current Facility-Administered Medications   Medication Dose Route Frequency    insulin glargine (LANTUS) injection 20 Units  20 Units SubCUTAneous QHS    methylPREDNISolone (PF) (SOLU-MEDROL) injection 60 mg  60 mg IntraVENous Q8H    acetylcysteine (MUCOMYST) 100 mg/mL (10 %) nebulizer solution 400 mg  4 mL Nebulization Q6H RT    acetaminophen (TYLENOL) tablet 650 mg  650 mg Oral Q6H PRN    Or    acetaminophen (TYLENOL) suppository 650 mg  650 mg Rectal Q6H PRN    polyethylene glycol (MIRALAX) packet 17 g  17 g Oral DAILY PRN    ondansetron (ZOFRAN ODT) tablet 4 mg  4 mg Oral Q8H PRN    Or    ondansetron (ZOFRAN) injection 4 mg  4 mg IntraVENous Q6H PRN    insulin lispro (HUMALOG) injection   SubCUTAneous AC&HS    piperacillin-tazobactam (ZOSYN) 3.375 g in 0.9% sodium chloride (MBP/ADV) 100 mL MBP  3.375 g IntraVENous Q8H    metoprolol tartrate (LOPRESSOR) tablet 25 mg  25 mg Oral Q12H    aspirin delayed-release tablet 81 mg  81 mg Oral DAILY    LORazepam (ATIVAN) injection 1 mg  1 mg IntraVENous Q6H PRN    nicotine (NICODERM CQ) 21 mg/24 hr patch 1 Patch  1 Patch TransDERmal Q24H    albuterol (PROVENTIL VENTOLIN) nebulizer solution 2.5 mg  2.5 mg Nebulization Q4H PRN    budesonide (PULMICORT) 500 mcg/2 ml nebulizer suspension  500 mcg Nebulization BID RT    albuterol (PROVENTIL VENTOLIN) nebulizer solution 2.5 mg  2.5 mg Nebulization Q4H RT    sodium chloride (NS) flush 5-10 mL  5-10 mL IntraVENous Q8H    sodium chloride (NS) flush 5-10 mL  5-10 mL IntraVENous PRN       Review of Systems    Constitutional: negative for fever, chills, sweats  Cardiovascular: negative for chest pain, palpitations, syncope, edema  Gastrointestinal:  negative for dysphagia, reflux, vomiting, diarrhea, abdominal pain, or melena  Neurologic:  negative for focal weakness, numbness, headache    Objective:     Vitals:    06/13/21 0402 06/13/21 0451 06/13/21 0734 06/13/21 0745   BP: 118/62  134/75    Pulse: 67  (!) 101    Resp: 18  18    Temp: 97.9 °F (36.6 °C)  98.2 °F (36.8 °C)    SpO2: 92% 95% 97% 100%   Weight: 146 lb 8 oz (66.5 kg)      Height:             Intake/Output Summary (Last 24 hours) at 6/13/2021 1030  Last data filed at 6/12/2021 2328  Gross per 24 hour   Intake 680 ml   Output    Net 680 ml       Physical Exam:   Constitution:  the patient is well developed and in no acute distress  EENMT:  Sclera clear, pupils equal, oral mucosa moist  Respiratory: decreased BS bilaterally with a few scattered rhonchi  Cardiovascular:  RRR without M,G,R  Gastrointestinal: soft and non-tender; with positive bowel sounds. Musculoskeletal: warm without cyanosis. There is no lower extremity edema. Skin:  no jaundice or rashes  Neurologic: no gross neuro deficits     Psychiatric:  alert and oriented x 3    CXR:           LAB  Recent Labs     06/13/21  0556 06/13/21  0020 06/12/21  2051 06/12/21  1614 06/12/21  1135   GLUCPOC 276* 300* 377* 327* 318*      Recent Labs     06/12/21  0829 06/11/21  0459 06/10/21  1825   WBC 8.3 16.1* 11.6*   HGB 15.5 15.8 18.4*   HCT 46.1 46.8 54.4*    301 384     Recent Labs     06/12/21  0829 06/11/21  1714 06/11/21  0459 06/10/21  1825   * 132* 128* 121*   K 3.5 4.1 4.4 7.7*   CL 94* 93* 90* 80*   CO2 31 32 28 32   * 348* 393* 464*   BUN 22 24* 29* 31*   CREA 0.77* 0.73* 0.61* 0.97   MG  --  2.2 2.1 2.2   CA 8.8 8.5 8.4 9.3   ALB 1.9*  --   --  2.2*   TBILI 0.8  --   --  1.3*   ALT 14  --   --  23     No results for input(s): PH, PCO2, PO2, HCO3, PHI, PCO2I, PO2I, HCO3I in the last 72 hours.   Recent Labs     06/11/21  0459 06/10/21  2313 06/10/21  1825   LAC 2.0 2.9* 2.6*         Assessment:  (Medical Decision Making)     Hospital Problems  Date Reviewed: 6/11/2021        Codes Class Noted POA    COPD exacerbation ([facility] ICD-10-CM: J44.1  ICD-9-CM: 491.21  6/11/2021 Unknown    Clinically better. * (Principal) Pneumomediastinum ([facility] [address] ICD-10-CM: J98.2  ICD-9-CM: 518.1  6/10/2021 Unknown    Follow. Bronch on Monday for inspection of possible airway tear    Substance abuse ([facility] [address] (Chronic) ICD-10-CM: F19.10  ICD-9-CM: 305.90  6/10/2021 Unknown        Acute hyponatremia ICD-10-CM: E87.1  ICD-9-CM: 276.1  6/10/2021 Unknown    Better    Uncontrolled type II diabetes mellitus ([facility] [address] (Chronic) ICD-10-CM: E11.65  ICD-9-CM: 250.02  6/10/2021 Unknown    Steroids worsening glycemic control. Decreasing to BID and increasing SS. Dehydration ICD-10-CM: E86.0  ICD-9-CM: 276.51  6/10/2021 Unknown              Plan:  (Medical Decision Making)     Decrease SM to BID  Increased lantus to 50U qHS yesterday. Dose dropped to 20 Usince he will be NPO for bronch Monday. Continue nebs. Dr. Sky Cuellar to inspect airway tomorrow. Continue Zosyn. Potassium 20 meq x 1  --    More than 50% of the time documented was spent in face-to-face contact with the patient and in the care of the patient on the floor/unit where the patient is located.     Baldemar Galaviz MD

## 2021-06-13 NOTE — PROGRESS NOTES
[facility]ist Progress Note     Name:  Wan De La Torre  Age:55 y.o. Sex:male   :  1965    MRN:  [id number]     Admit Date:  6/10/2021    Reason for Admission:  Pneumomediastinum [facility]) [J98.2]    Assessment & Plan       Acute respiratory failure with COPD exacerbation, pneumonia, possible tracheal defect with pneumomediastinum:  · Wean O2 as tolerant, on 1 L NC  · D2 zosyn  · IV steroid taper   · Scheduled nebs, pulmicort  ·  bronch tomorrow   · appreciate pulmonary  · Needs smoking cessation       Dm2:  · SSI  · decrease  lantus for NPO at midnight        Hyponatremia:  · Corrected for hyperglycemia   · Trend BMP-improved       VTACH:  · followup remote tele   · Follow potassium/ magnesium  · Continued metoprolol       Elevated LFTS, HEPC:  · Trend LFTS      Hypokalemia:  · Replace and repeat lab         Diet:  DIET ADULT  DIET NPO  DIET NPO  DVT PPx: SCD  GI Ppx:  none  Code: Full Code    Dispo/Discharge Planning: pending     Hospital Course/Subjective:       Mr. Farhat Blackburn is a 62 yo male PMH of COPD, DM2, HTN, tobacco use, methamphetamine use, HEPC, HFrEF, VTACH admitted with COPD exacerbation. CTA chest shows defect or rupture of posterior tracheal wall near thoracic inlet with air into the upper mediastinum and soft tissues, obstruction of bilateral lower bronchi. He has been seen by pulmonary and placed on nebs, pulmicort, IV steroids and zosyn. BC NGTD. Plans are for bronch 6-14.        Discharge plans pending         Subjective/24 hr Events (21):    Eating lunch, some dyspnea and cough, no edema, having BM     Objective:     Patient Vitals for the past 24 hrs:   Temp Pulse Resp BP SpO2   21 1113 98.1 °F (36.7 °C) 100 18 130/75 98 %   21 0745     100 %   21 0734 98.2 °F (36.8 °C) (!) 101 18 134/75 97 %   21 0451     95 %   21 0402 97.9 °F (36.6 °C) 67 18 118/62 92 %   21 0100     98 %   21 2328 98.5 °F (36.9 °C) 76 18 138/70 95 % 06/12/21 2145  90  (!) 144/61    06/12/21 2021     97 %   06/12/21 1923 97.5 °F (36.4 °C) 84 18 124/67 93 %   06/12/21 1742     99 %   06/12/21 1520     96 %   06/12/21 1517 98 °F (36.7 °C) 87 18 122/72 97 %     Oxygen Therapy  O2 Sat (%): 98 % (06/13/21 1113)  Pulse via Oximetry: 49 beats per minute (06/13/21 0745)  O2 Device: Nasal cannula (06/13/21 0759)  Skin Assessment: Clean, dry, & intact (06/12/21 1742)  O2 Flow Rate (L/min): 1 l/min (06/13/21 0759)  FIO2 (%): 28 % (06/13/21 0451)    Body mass index is 20.43 kg/m². Physical Exam:   General:     Alert, no distress    Lungs:   Coarse   Cardiac:   RRR, Normal S1 and S2. No S3, S4 or murmurs.   No edema   Abdomen:   Soft, non distended, tender mid epigastric region, +BS, no guarding/rebound  Neuro:   nonfocal        Data Review:  I have reviewed all labs, meds, and studies from the last 24 hours:    Labs:  Recent Results (from the past 24 hour(s))   GLUCOSE, POC    Collection Time: 06/12/21  4:14 PM   Result Value Ref Range    Glucose (POC) 327 (H) 65 - 100 mg/dL    Performed by [employer]    GLUCOSE, POC    Collection Time: 06/12/21  8:51 PM   Result Value Ref Range    Glucose (POC) 377 (H) 65 - 100 mg/dL    Performed by [employer]    GLUCOSE, POC    Collection Time: 06/13/21 12:20 AM   Result Value Ref Range    Glucose (POC) 300 (H) 65 - 100 mg/dL    Performed by PRATIMA VALDES [facility]    GLUCOSE, POC    Collection Time: 06/13/21  5:56 AM   Result Value Ref Range    Glucose (POC) 276 (H) 65 - 100 mg/dL    Performed by [employer]        All Micro Results     Procedure Component Value Units Date/Time    CULTURE, BLOOD [[id number]] Collected: 06/10/21 1825    Order Status: Completed Specimen: Blood Updated: 06/13/21 1032     Special Requests: LAC     Culture result: NO GROWTH 3 DAYS       CULTURE, BLOOD [[id number]] Collected: 06/10/21 1825    Order Status: Completed Specimen: Blood Updated: 06/13/21 1032     Special Requests: RAC Culture result: NO GROWTH 3 DAYS       CULTURE, RESPIRATORY/SPUTUM/BRONCH Willie Granado [[id number]]     Order Status: Sent Specimen: Sputum           EKG Results     Procedure 720 Value Units Date/Time    EKG, 12 LEAD, INITIAL [[id number]] Collected: 06/10/21 1954    Order Status: Completed Updated: 06/10/21 2052     Ventricular Rate 96 BPM      Atrial Rate 96 BPM      P-R Interval 196 ms      QRS Duration 150 ms      Q-T Interval 410 ms      QTC Calculation (Bezet) 517 ms      Calculated P Axis 86 degrees      Calculated R Axis -54 degrees      Calculated T Axis 105 degrees      Diagnosis --       Sinus rhythm with sinus arrhythmia with occasional Premature ventricular   complexes  Biatrial enlargement  Non-specific intra-ventricular conduction block  Abnormal ECG  When compared with ECG of 10-NICHOLE-2021 17:32,  Premature supraventricular complexes are no longer Present  Serial changes of Lateral infarct Present  Confirmed by Gia Garcia ([id number]) on 6/10/2021 8:52:09 PM      EKG [[id number]] Collected: 06/10/21 1732    Order Status: Completed Updated: 06/10/21 1807     Ventricular Rate 89 BPM      Atrial Rate 89 BPM      P-R Interval 198 ms      QRS Duration 152 ms      Q-T Interval 450 ms      QTC Calculation (Bezet) 547 ms      Calculated P Axis 82 degrees      Calculated R Axis -64 degrees      Calculated T Axis 101 degrees      Diagnosis --     !! AGE AND GENDER SPECIFIC ECG ANALYSIS !! Sinus rhythm with Premature supraventricular complexes with occasional   Premature ventricular complexes  Biatrial enlargement  Non-specific intra-ventricular conduction block  anterior infarct, age indeterminant  Abnormal ECG  No previous ECGs available  Confirmed by Ramon Watson MD (), ELLIS RIZVI ([id number]) on 6/10/2021 6:07:12 PM            Other Studies:  No results found.     Current Meds:   Current Facility-Administered Medications   Medication Dose Route Frequency    insulin glargine (LANTUS) injection 20 Units  20 Units SubCUTAneous QHS    methylPREDNISolone (PF) (Solu-MEDROL) injection 60 mg  60 mg IntraVENous BID    potassium chloride (K-DUR, KLOR-CON) SR tablet 20 mEq  20 mEq Oral NOW    acetylcysteine (MUCOMYST) 100 mg/mL (10 %) nebulizer solution 400 mg  4 mL Nebulization Q6H RT    acetaminophen (TYLENOL) tablet 650 mg  650 mg Oral Q6H PRN    Or    acetaminophen (TYLENOL) suppository 650 mg  650 mg Rectal Q6H PRN    polyethylene glycol (MIRALAX) packet 17 g  17 g Oral DAILY PRN    ondansetron (ZOFRAN ODT) tablet 4 mg  4 mg Oral Q8H PRN    Or    ondansetron (ZOFRAN) injection 4 mg  4 mg IntraVENous Q6H PRN    insulin lispro (HUMALOG) injection   SubCUTAneous AC&HS    piperacillin-tazobactam (ZOSYN) 3.375 g in 0.9% sodium chloride (MBP/ADV) 100 mL MBP  3.375 g IntraVENous Q8H    metoprolol tartrate (LOPRESSOR) tablet 25 mg  25 mg Oral Q12H    aspirin delayed-release tablet 81 mg  81 mg Oral DAILY    LORazepam (ATIVAN) injection 1 mg  1 mg IntraVENous Q6H PRN    nicotine (NICODERM CQ) 21 mg/24 hr patch 1 Patch  1 Patch TransDERmal Q24H    albuterol (PROVENTIL VENTOLIN) nebulizer solution 2.5 mg  2.5 mg Nebulization Q4H PRN    budesonide (PULMICORT) 500 mcg/2 ml nebulizer suspension  500 mcg Nebulization BID RT    albuterol (PROVENTIL VENTOLIN) nebulizer solution 2.5 mg  2.5 mg Nebulization Q4H RT    sodium chloride (NS) flush 5-10 mL  5-10 mL IntraVENous Q8H    sodium chloride (NS) flush 5-10 mL  5-10 mL IntraVENous PRN       Problem List:  Hospital Problems as of 6/13/2021 Date Reviewed: 6/11/2021        Codes Class Noted - Resolved POA    COPD exacerbation ([facility] ICD-10-CM: J44.1  ICD-9-CM: 491.21  6/11/2021 - Present Unknown        * (Principal) Pneumomediastinum ([facility] ICD-10-CM: J98.2  ICD-9-CM: 518.1  6/10/2021 - Present Unknown        Substance abuse ([facility] [address] (Chronic) ICD-10-CM: F19.10  ICD-9-CM: 305.90  6/10/2021 - Present Unknown        Acute hyponatremia ICD-10-CM: E87.1  ICD-9-CM: 276.1  6/10/2021 - Present Unknown        Uncontrolled type II diabetes mellitus ([facility] (Chronic) ICD-10-CM: E11.65  ICD-9-CM: 250.02  6/10/2021 - Present Unknown        Dehydration ICD-10-CM: E86.0  ICD-9-CM: 276.51  6/10/2021 - Present Unknown                   Part of this note was written by using a voice dictation software and the note has been proof read but may still contain some grammatical/other typographical errors.     Signed By: Ruth Little MD   [employer] [facility]    June 13, 2021  3:16 PM

## 2021-06-14 ENCOUNTER — ANESTHESIA EVENT (OUTPATIENT)
Dept: ENDOSCOPY | Age: 56
DRG: 143 | End: 2021-06-14
Payer: COMMERCIAL

## 2021-06-14 ENCOUNTER — ANESTHESIA (OUTPATIENT)
Dept: ENDOSCOPY | Age: 56
DRG: 143 | End: 2021-06-14
Payer: COMMERCIAL

## 2021-06-14 PROBLEM — S11.021A: Status: ACTIVE | Noted: 2021-06-14

## 2021-06-14 PROBLEM — F15.10 METHAMPHETAMINE USE (HCC): Chronic | Status: ACTIVE | Noted: 2018-03-31

## 2021-06-14 PROBLEM — J38.00 VOCAL CORD PARALYSIS: Chronic | Status: ACTIVE | Noted: 2021-03-17

## 2021-06-14 PROBLEM — I10 ESSENTIAL HYPERTENSION: Chronic | Status: ACTIVE | Noted: 2018-04-09

## 2021-06-14 PROBLEM — E11.9 TYPE 2 DIABETES MELLITUS WITHOUT COMPLICATION, WITHOUT LONG-TERM CURRENT USE OF INSULIN (HCC): Chronic | Status: ACTIVE | Noted: 2018-03-31

## 2021-06-14 PROBLEM — Z72.0 TOBACCO ABUSE: Chronic | Status: ACTIVE | Noted: 2018-03-31

## 2021-06-14 LAB
ALBUMIN SERPL-MCNC: 2 G/DL (ref 3.5–5)
ALBUMIN/GLOB SERPL: 0.5 {RATIO} (ref 1.2–3.5)
ALP SERPL-CCNC: 142 U/L (ref 50–136)
ALT SERPL-CCNC: 24 U/L (ref 12–65)
ANION GAP SERPL CALC-SCNC: 5 MMOL/L (ref 7–16)
AST SERPL-CCNC: 21 U/L (ref 15–37)
BILIRUB DIRECT SERPL-MCNC: 0.1 MG/DL
BILIRUB SERPL-MCNC: 0.4 MG/DL (ref 0.2–1.1)
BUN SERPL-MCNC: 20 MG/DL (ref 6–23)
CALCIUM SERPL-MCNC: 8.6 MG/DL (ref 8.3–10.4)
CHLORIDE SERPL-SCNC: 98 MMOL/L (ref 98–107)
CO2 SERPL-SCNC: 33 MMOL/L (ref 21–32)
CREAT SERPL-MCNC: 0.62 MG/DL (ref 0.8–1.5)
ERYTHROCYTE [DISTWIDTH] IN BLOOD BY AUTOMATED COUNT: 12.8 % (ref 11.9–14.6)
GLOBULIN SER CALC-MCNC: 3.9 G/DL (ref 2.3–3.5)
GLUCOSE BLD STRIP.AUTO-MCNC: 112 MG/DL (ref 65–100)
GLUCOSE BLD STRIP.AUTO-MCNC: 112 MG/DL (ref 65–100)
GLUCOSE BLD STRIP.AUTO-MCNC: 139 MG/DL (ref 65–100)
GLUCOSE BLD STRIP.AUTO-MCNC: 183 MG/DL (ref 65–100)
GLUCOSE BLD STRIP.AUTO-MCNC: 254 MG/DL (ref 65–100)
GLUCOSE SERPL-MCNC: 150 MG/DL (ref 65–100)
HCT VFR BLD AUTO: 40.1 % (ref 41.1–50.3)
HGB BLD-MCNC: 13.3 G/DL (ref 13.6–17.2)
MAGNESIUM SERPL-MCNC: 1.9 MG/DL (ref 1.8–2.4)
MCH RBC QN AUTO: 26.2 PG (ref 26.1–32.9)
MCHC RBC AUTO-ENTMCNC: 33.2 G/DL (ref 31.4–35)
MCV RBC AUTO: 78.9 FL (ref 79.6–97.8)
NRBC # BLD: 0 K/UL (ref 0–0.2)
PLATELET # BLD AUTO: 161 K/UL (ref 150–450)
PMV BLD AUTO: 10.2 FL (ref 9.4–12.3)
POTASSIUM SERPL-SCNC: 3.9 MMOL/L (ref 3.5–5.1)
PROT SERPL-MCNC: 5.9 G/DL (ref 6.3–8.2)
RBC # BLD AUTO: 5.08 M/UL (ref 4.23–5.6)
SERVICE CMNT-IMP: ABNORMAL
SODIUM SERPL-SCNC: 136 MMOL/L (ref 138–145)
WBC # BLD AUTO: 7.9 K/UL (ref 4.3–11.1)

## 2021-06-14 PROCEDURE — 94760 N-INVAS EAR/PLS OXIMETRY 1: CPT

## 2021-06-14 PROCEDURE — 77030013670 HC FCPS BIOP DISP ENDOSC OCOA -B: Performed by: INTERNAL MEDICINE

## 2021-06-14 PROCEDURE — 99232 SBSQ HOSP IP/OBS MODERATE 35: CPT | Performed by: INTERNAL MEDICINE

## 2021-06-14 PROCEDURE — 31635 BRONCHOSCOPY W/FB REMOVAL: CPT | Performed by: INTERNAL MEDICINE

## 2021-06-14 PROCEDURE — 77030012699 HC VLV SUC CNTRL OCOA -A: Performed by: INTERNAL MEDICINE

## 2021-06-14 PROCEDURE — 80048 BASIC METABOLIC PNL TOTAL CA: CPT

## 2021-06-14 PROCEDURE — 76040000026: Performed by: INTERNAL MEDICINE

## 2021-06-14 PROCEDURE — 77010033678 HC OXYGEN DAILY

## 2021-06-14 PROCEDURE — 2709999900 HC NON-CHARGEABLE SUPPLY: Performed by: INTERNAL MEDICINE

## 2021-06-14 PROCEDURE — 76060000033 HC ANESTHESIA 1 TO 1.5 HR: Performed by: INTERNAL MEDICINE

## 2021-06-14 PROCEDURE — 74011000250 HC RX REV CODE- 250: Performed by: NURSE ANESTHETIST, CERTIFIED REGISTERED

## 2021-06-14 PROCEDURE — 74011000258 HC RX REV CODE- 258: Performed by: HOSPITALIST

## 2021-06-14 PROCEDURE — 65660000000 HC RM CCU STEPDOWN

## 2021-06-14 PROCEDURE — 74011250637 HC RX REV CODE- 250/637: Performed by: INTERNAL MEDICINE

## 2021-06-14 PROCEDURE — 74011250636 HC RX REV CODE- 250/636: Performed by: NURSE PRACTITIONER

## 2021-06-14 PROCEDURE — 74011000250 HC RX REV CODE- 250: Performed by: INTERNAL MEDICINE

## 2021-06-14 PROCEDURE — 74011000250 HC RX REV CODE- 250: Performed by: NURSE PRACTITIONER

## 2021-06-14 PROCEDURE — 94640 AIRWAY INHALATION TREATMENT: CPT

## 2021-06-14 PROCEDURE — 74011250636 HC RX REV CODE- 250/636: Performed by: INTERNAL MEDICINE

## 2021-06-14 PROCEDURE — 77030021593 HC FCPS BIOP ENDOSC BSC -A: Performed by: INTERNAL MEDICINE

## 2021-06-14 PROCEDURE — 88300 SURGICAL PATH GROSS: CPT

## 2021-06-14 PROCEDURE — 36415 COLL VENOUS BLD VENIPUNCTURE: CPT

## 2021-06-14 PROCEDURE — 74011250636 HC RX REV CODE- 250/636: Performed by: NURSE ANESTHETIST, CERTIFIED REGISTERED

## 2021-06-14 PROCEDURE — 87106 FUNGI IDENTIFICATION YEAST: CPT

## 2021-06-14 PROCEDURE — 0BC18ZZ EXTIRPATION OF MATTER FROM TRACHEA, VIA NATURAL OR ARTIFICIAL OPENING ENDOSCOPIC: ICD-10-PCS | Performed by: INTERNAL MEDICINE

## 2021-06-14 PROCEDURE — 74011636637 HC RX REV CODE- 636/637: Performed by: INTERNAL MEDICINE

## 2021-06-14 PROCEDURE — 74011000250 HC RX REV CODE- 250: Performed by: HOSPITALIST

## 2021-06-14 PROCEDURE — 85027 COMPLETE CBC AUTOMATED: CPT

## 2021-06-14 PROCEDURE — 82962 GLUCOSE BLOOD TEST: CPT

## 2021-06-14 PROCEDURE — 74011250637 HC RX REV CODE- 250/637: Performed by: NURSE PRACTITIONER

## 2021-06-14 PROCEDURE — 74011250636 HC RX REV CODE- 250/636: Performed by: HOSPITALIST

## 2021-06-14 PROCEDURE — 74011250637 HC RX REV CODE- 250/637: Performed by: HOSPITALIST

## 2021-06-14 PROCEDURE — 87070 CULTURE OTHR SPECIMN AEROBIC: CPT

## 2021-06-14 PROCEDURE — 83735 ASSAY OF MAGNESIUM: CPT

## 2021-06-14 PROCEDURE — 74011636637 HC RX REV CODE- 636/637: Performed by: HOSPITALIST

## 2021-06-14 PROCEDURE — 80076 HEPATIC FUNCTION PANEL: CPT

## 2021-06-14 PROCEDURE — 77030041030 HC DEV AEROSZR TRACHBRONCH STNT MRTM -C: Performed by: INTERNAL MEDICINE

## 2021-06-14 RX ORDER — SODIUM CHLORIDE, SODIUM LACTATE, POTASSIUM CHLORIDE, CALCIUM CHLORIDE 600; 310; 30; 20 MG/100ML; MG/100ML; MG/100ML; MG/100ML
INJECTION, SOLUTION INTRAVENOUS
Status: DISCONTINUED | OUTPATIENT
Start: 2021-06-14 | End: 2021-06-14 | Stop reason: HOSPADM

## 2021-06-14 RX ORDER — LIDOCAINE HYDROCHLORIDE 20 MG/ML
INJECTION, SOLUTION EPIDURAL; INFILTRATION; INTRACAUDAL; PERINEURAL AS NEEDED
Status: DISCONTINUED | OUTPATIENT
Start: 2021-06-14 | End: 2021-06-14 | Stop reason: HOSPADM

## 2021-06-14 RX ORDER — POTASSIUM CHLORIDE 20 MEQ/1
20 TABLET, EXTENDED RELEASE ORAL
Status: COMPLETED | OUTPATIENT
Start: 2021-06-14 | End: 2021-06-14

## 2021-06-14 RX ORDER — ACETYLCYSTEINE 200 MG/ML
400 SOLUTION ORAL; RESPIRATORY (INHALATION)
Status: DISCONTINUED | OUTPATIENT
Start: 2021-06-14 | End: 2021-06-15

## 2021-06-14 RX ORDER — MAGNESIUM SULFATE HEPTAHYDRATE 40 MG/ML
2 INJECTION, SOLUTION INTRAVENOUS ONCE
Status: COMPLETED | OUTPATIENT
Start: 2021-06-14 | End: 2021-06-14

## 2021-06-14 RX ORDER — LISINOPRIL 5 MG/1
10 TABLET ORAL DAILY
Status: DISCONTINUED | OUTPATIENT
Start: 2021-06-15 | End: 2021-06-18 | Stop reason: HOSPADM

## 2021-06-14 RX ORDER — ALBUTEROL SULFATE 0.83 MG/ML
2.5 SOLUTION RESPIRATORY (INHALATION)
Status: DISCONTINUED | OUTPATIENT
Start: 2021-06-14 | End: 2021-06-18 | Stop reason: HOSPADM

## 2021-06-14 RX ORDER — PROPOFOL 10 MG/ML
INJECTION, EMULSION INTRAVENOUS AS NEEDED
Status: DISCONTINUED | OUTPATIENT
Start: 2021-06-14 | End: 2021-06-14 | Stop reason: HOSPADM

## 2021-06-14 RX ADMIN — ACETYLCYSTEINE 400 MG: 200 SOLUTION ORAL; RESPIRATORY (INHALATION) at 08:15

## 2021-06-14 RX ADMIN — SODIUM CHLORIDE, SODIUM LACTATE, POTASSIUM CHLORIDE, AND CALCIUM CHLORIDE: 600; 310; 30; 20 INJECTION, SOLUTION INTRAVENOUS at 14:35

## 2021-06-14 RX ADMIN — ACETYLCYSTEINE 400 MG: 100 SOLUTION ORAL; RESPIRATORY (INHALATION) at 01:00

## 2021-06-14 RX ADMIN — BUDESONIDE 500 MCG: 0.5 INHALANT RESPIRATORY (INHALATION) at 08:16

## 2021-06-14 RX ADMIN — BUDESONIDE 500 MCG: 0.5 INHALANT RESPIRATORY (INHALATION) at 20:27

## 2021-06-14 RX ADMIN — ACETYLCYSTEINE 400 MG: 200 SOLUTION ORAL; RESPIRATORY (INHALATION) at 11:44

## 2021-06-14 RX ADMIN — PROPOFOL 100 MCG/KG/MIN: 10 INJECTION, EMULSION INTRAVENOUS at 14:42

## 2021-06-14 RX ADMIN — LIDOCAINE HYDROCHLORIDE 40 MG: 20 INJECTION, SOLUTION EPIDURAL; INFILTRATION; INTRACAUDAL; PERINEURAL at 14:41

## 2021-06-14 RX ADMIN — ALBUTEROL SULFATE 2.5 MG: 2.5 SOLUTION RESPIRATORY (INHALATION) at 04:38

## 2021-06-14 RX ADMIN — METOPROLOL TARTRATE 25 MG: 25 TABLET, FILM COATED ORAL at 21:26

## 2021-06-14 RX ADMIN — ALBUTEROL SULFATE 2.5 MG: 2.5 SOLUTION RESPIRATORY (INHALATION) at 01:00

## 2021-06-14 RX ADMIN — PIPERACILLIN SODIUM AND TAZOBACTAM SODIUM 3.38 G: 3; .375 INJECTION, POWDER, LYOPHILIZED, FOR SOLUTION INTRAVENOUS at 05:56

## 2021-06-14 RX ADMIN — PHENYLEPHRINE HYDROCHLORIDE 100 MCG: 10 INJECTION INTRAVENOUS at 15:07

## 2021-06-14 RX ADMIN — INSULIN GLARGINE 20 UNITS: 100 INJECTION, SOLUTION SUBCUTANEOUS at 21:26

## 2021-06-14 RX ADMIN — PROPOFOL 40 MG: 10 INJECTION, EMULSION INTRAVENOUS at 14:41

## 2021-06-14 RX ADMIN — POTASSIUM CHLORIDE 20 MEQ: 20 TABLET, EXTENDED RELEASE ORAL at 11:48

## 2021-06-14 RX ADMIN — INSULIN LISPRO 9 UNITS: 100 INJECTION, SOLUTION INTRAVENOUS; SUBCUTANEOUS at 21:26

## 2021-06-14 RX ADMIN — ALBUTEROL SULFATE 2.5 MG: 2.5 SOLUTION RESPIRATORY (INHALATION) at 20:27

## 2021-06-14 RX ADMIN — ALBUTEROL SULFATE 2.5 MG: 2.5 SOLUTION RESPIRATORY (INHALATION) at 11:44

## 2021-06-14 RX ADMIN — METHYLPREDNISOLONE SODIUM SUCCINATE 40 MG: 40 INJECTION, POWDER, FOR SOLUTION INTRAMUSCULAR; INTRAVENOUS at 21:25

## 2021-06-14 RX ADMIN — SODIUM CHLORIDE 10 ML: 9 INJECTION, SOLUTION INTRAMUSCULAR; INTRAVENOUS; SUBCUTANEOUS at 05:16

## 2021-06-14 RX ADMIN — SODIUM CHLORIDE 10 ML: 9 INJECTION, SOLUTION INTRAMUSCULAR; INTRAVENOUS; SUBCUTANEOUS at 21:40

## 2021-06-14 RX ADMIN — MAGNESIUM SULFATE HEPTAHYDRATE 2 G: 40 INJECTION, SOLUTION INTRAVENOUS at 12:03

## 2021-06-14 RX ADMIN — ACETYLCYSTEINE 400 MG: 200 SOLUTION ORAL; RESPIRATORY (INHALATION) at 20:27

## 2021-06-14 RX ADMIN — SODIUM CHLORIDE 10 ML: 9 INJECTION, SOLUTION INTRAMUSCULAR; INTRAVENOUS; SUBCUTANEOUS at 11:47

## 2021-06-14 RX ADMIN — ACETAMINOPHEN 650 MG: 325 TABLET ORAL at 21:26

## 2021-06-14 RX ADMIN — ALBUTEROL SULFATE 2.5 MG: 2.5 SOLUTION RESPIRATORY (INHALATION) at 08:16

## 2021-06-14 RX ADMIN — PIPERACILLIN SODIUM AND TAZOBACTAM SODIUM 3.38 G: 3; .375 INJECTION, POWDER, LYOPHILIZED, FOR SOLUTION INTRAVENOUS at 21:25

## 2021-06-14 NOTE — PROGRESS NOTES
TRANSFER - IN REPORT:    Verbal report received from [facility] on [address]  being received from [facility] for routine progression of care      Report consisted of patients Situation, Background, Assessment and   Recommendations(SBAR). Information from the following report(s) SBAR, Kardex, OR Summary, Procedure Summary, Intake/Output, MAR, Accordion and Recent Results was reviewed with the receiving nurse. Opportunity for questions and clarification was provided. Assessment completed upon patients arrival to unit and care assumed.

## 2021-06-14 NOTE — PROGRESS NOTES
TRANSFER - OUT REPORT:    Verbal report given to Rome Haro RN on Isaiah Flores  being transferred to GI lab for Bronch for ordered procedure       Report consisted of patients Situation, Background, Assessment and   Recommendations(SBAR). Information from the following report(s) SBAR, Kardex, ED Summary, Intake/Output, MAR, Accordion, Recent Results and Med Rec Status was reviewed with the receiving nurse. Lines:   Peripheral IV 06/10/21 Right Antecubital (Active)   Site Assessment Clean, dry, & intact 06/14/21 0712   Phlebitis Assessment 0 06/14/21 0712   Infiltration Assessment 0 06/14/21 0712   Dressing Status Clean, dry, & intact 06/14/21 0712   Dressing Type Transparent 06/14/21 0712   Hub Color/Line Status Infusing 06/14/21 6438   Action Taken Dressing changed 06/13/21 2050   Alcohol Cap Used No 06/14/21 0310        Opportunity for questions and clarification was provided.       Patient transported with:   [employer]

## 2021-06-14 NOTE — PROGRESS NOTES
LUKASZ page to MD Jt that pt had 12 beat run of VT. Current VS: 140/76 (101), 74, 95% RA. Pt denies CP and SOB. BMP pending.

## 2021-06-14 NOTE — PROGRESS NOTES
Assessment completed and documented, see docflow. Patient resting quietly in bed. NAD noted at present. Respirations even and non labored on RA. Awakens to voice, alert and oriented x 4. Drowsy. Call light within reach. Girlfriend at bedside. Will continue to monitor.

## 2021-06-14 NOTE — INTERVAL H&P NOTE
Update History & Physical 
 
The Patient's History and Physical of June 11,  
 was reviewed with the patient and I examined the patient. There was no change. The surgical site was confirmed by the patient and me. Plan:  The risk, benefits, expected outcome, and alternative to the recommended procedure have been discussed with the patient. Patient understands and wants to proceed with the procedure.  
 
Electronically signed by Lance Fernandez MD on 6/14/2021 at 2:35 PM

## 2021-06-14 NOTE — PROGRESS NOTES
No acute events overnight. Pt reports no pain. Weaned to RA. Scheduled for bronch at 1430. Problem: Falls - Risk of  Goal: *Absence of Falls  Description: Document Leeanna Sukhdev Fall Risk and appropriate interventions in the flowsheet. Outcome: Progressing Towards Goal  Note: Fall Risk Interventions:  Mobility Interventions: Communicate number of staff needed for ambulation/transfer, Bed/chair exit alarm, Patient to call before getting OOB, Strengthening exercises (ROM-active/passive)         Medication Interventions: Bed/chair exit alarm    Elimination Interventions: Bed/chair exit alarm              Problem: Patient Education: Go to Patient Education Activity  Goal: Patient/Family Education  Outcome: Progressing Towards Goal     Problem: Pressure Injury - Risk of  Goal: *Prevention of pressure injury  Description: Document Kenneth Scale and appropriate interventions in the flowsheet.   Outcome: Progressing Towards Goal  Note: Pressure Injury Interventions:  Sensory Interventions: Assess changes in LOC, Maintain/enhance activity level    Moisture Interventions: Minimize layers    Activity Interventions: PT/OT evaluation    Mobility Interventions: Pressure redistribution bed/mattress (bed type)    Nutrition Interventions: Document food/fluid/supplement intake    Friction and Shear Interventions: Minimize layers                Problem: Patient Education: Go to Patient Education Activity  Goal: Patient/Family Education  Outcome: Progressing Towards Goal

## 2021-06-14 NOTE — PERIOP NOTES
TRANSFER - OUT REPORT:    Verbal report given to Talat Pearson RN (name) on [facility]  being transferred to room 809 (unit) for routine progression of care       Report consisted of patients Situation, Background, Assessment and   Recommendations(SBAR). Information from the following report(s) SBAR, Kardex and Procedure Summary was reviewed with the receiving nurse. Lines:   Peripheral IV 06/10/21 Right Antecubital (Active)   Site Assessment Clean, dry, & intact 06/14/21 0712   Phlebitis Assessment 0 06/14/21 0712   Infiltration Assessment 0 06/14/21 0712   Dressing Status Clean, dry, & intact 06/14/21 0712   Dressing Type Transparent 06/14/21 0712   Hub Color/Line Status Infusing 06/14/21 1758   Action Taken Dressing changed 06/13/21 2050   Alcohol Cap Used No 06/14/21 0310        Opportunity for questions and clarification was provided.       Patient transported with:   Ynes Escoto

## 2021-06-14 NOTE — ANESTHESIA POSTPROCEDURE EVALUATION
Procedure(s):  BRONCHOSCOPY **MD REQUEST NO INTUBATION**   BRONCHIAL TREE FOREIGN BODY REMOVAL. total IV anesthesia    Anesthesia Post Evaluation      Multimodal analgesia: multimodal analgesia used between 6 hours prior to anesthesia start to PACU discharge  Patient location during evaluation: PACU  Patient participation: complete - patient participated  Level of consciousness: awake and alert  Pain score: 0  Pain management: adequate  Airway patency: patent  Anesthetic complications: no  Cardiovascular status: acceptable and hemodynamically stable  Respiratory status: acceptable and spontaneous ventilation  Hydration status: acceptable  Post anesthesia nausea and vomiting:  none  Final Post Anesthesia Temperature Assessment:  Normothermia (36.0-37.5 degrees C)      INITIAL Post-op Vital signs:   Vitals Value Taken Time   /59 06/14/21 1538   Temp 36.7 °C (98 °F) 06/14/21 1537   Pulse 77 06/14/21 1543   Resp 12 06/14/21 1537   SpO2 98 % 06/14/21 1543   Vitals shown include unvalidated device data.

## 2021-06-14 NOTE — PROGRESS NOTES
[facility]ist Progress Note     Name:  Carlos Chapa  Age:55 y.o. Sex:male   :  1965    MRN:  [id number]     Admit Date:  6/10/2021    Reason for Admission:  Pneumomediastinum [facility]) [J98.2]    Assessment & Plan       Acute respiratory failure with COPD exacerbation, pneumonia, possible tracheal defect with pneumomediastinum:  · Wean O2 as tolerant, on room air   · D3 zosyn  · IV steroid taper   · Scheduled nebs, pulmicort  ·  bronch today   · appreciate pulmonary  · Needs smoking cessation       Dm2:  · SSI  · decrease  lantus since NPO        Hyponatremia:  · Corrected for hyperglycemia   · Trend BMP-improved       VTACH, HFrEF:  · followup remote tele   · Follow potassium/ magnesium- supplement   · Continued metoprolol /  lisinopril      Elevated LFTS, HEPC:  · Trend LFTS      Hypokalemia:  · Replace and repeat lab         Diet:  DIET NPO  DVT PPx: SCD  GI Ppx:  none  Code: Full Code    Dispo/Discharge Planning: pending , hopefully home -15    Hospital Course/Subjective:       Mr. Austin Welsh is a 62 yo male PMH of COPD, DM2, HTN, tobacco use, methamphetamine use, HEPC, HFrEF, VTACH admitted with COPD exacerbation. CTA chest shows defect or rupture of posterior tracheal wall near thoracic inlet with air into the upper mediastinum and soft tissues, obstruction of bilateral lower bronchi. He has been seen by pulmonary and placed on nebs, pulmicort, IV steroids and zosyn. BC NGTD. Plans are for bronch .        Discharge plans pending         Subjective/24 hr Events (21):    NPO, sleeping soundly, friend present     Objective:     Patient Vitals for the past 24 hrs:   Temp Pulse Resp BP SpO2   21 1144     94 %   21 1117 98 °F (36.7 °C) 93 20 125/67 97 %   21 0816     94 %   21 0704 98 °F (36.7 °C) 71 17 129/73 94 %   21 0648  74 16 (!) 140/76 95 %   21 0438     94 %   21 0310 98.8 °F (37.1 °C) 81 16 128/64 95 %   21 0100     99 %   06/14/21 0000 98.6 °F (37 °C) 73 16 139/70 94 %   06/13/21 2020     99 %   06/13/21 1915 99 °F (37.2 °C) 94 18 109/62 99 %   06/13/21 1536 98.3 °F (36.8 °C) 76 18 106/61 97 %   06/13/21 1450     97 %     Oxygen Therapy  O2 Sat (%): 94 % (06/14/21 1144)  Pulse via Oximetry: 87 beats per minute (06/14/21 1144)  O2 Device: None (Room air) (06/14/21 1144)  Skin Assessment: Clean, dry, & intact (06/12/21 1742)  O2 Flow Rate (L/min): 1 l/min (06/13/21 2050)  FIO2 (%): 24 % (06/13/21 2020)    Body mass index is 20.43 kg/m². Physical Exam:   General:     Sleepy, no distress    Lungs:   Clear   Cardiac:   RRR, Normal S1 and S2. No S3, S4 or murmurs.   No edema   Neuro:   nonfocal        Data Review:  I have reviewed all labs, meds, and studies from the last 24 hours:    Labs:  Recent Results (from the past 24 hour(s))   GLUCOSE, POC    Collection Time: 06/13/21  4:31 PM   Result Value Ref Range    Glucose (POC) 343 (H) 65 - 100 mg/dL    Performed by [facility]    GLUCOSE, POC    Collection Time: 06/13/21  8:53 PM   Result Value Ref Range    Glucose (POC) 410 (H) 65 - 100 mg/dL    Performed by [facility], [facility]    CBC W/O DIFF    Collection Time: 06/14/21  5:46 AM   Result Value Ref Range    WBC 7.9 4.3 - 11.1 K/uL    RBC 5.08 4.23 - 5.6 M/uL    HGB 13.3 (L) 13.6 - 17.2 g/dL    HCT 40.1 (L) 41.1 - 50.3 %    MCV 78.9 (L) 79.6 - 97.8 FL    MCH 26.2 26.1 - 32.9 PG    MCHC 33.2 31.4 - 35.0 g/dL    RDW 12.8 11.9 - 14.6 %    PLATELET 760 215 - 979 K/uL    MPV 10.2 9.4 - 12.3 FL    ABSOLUTE NRBC 0.00 0.0 - 0.2 K/uL   METABOLIC PANEL, BASIC    Collection Time: 06/14/21  5:46 AM   Result Value Ref Range    Sodium 136 (L) 138 - 145 mmol/L    Potassium 3.9 3.5 - 5.1 mmol/L    Chloride 98 98 - 107 mmol/L    CO2 33 (H) 21 - 32 mmol/L    Anion gap 5 (L) 7 - 16 mmol/L    Glucose 150 (H) 65 - 100 mg/dL    BUN 20 6 - 23 MG/DL    Creatinine 0.62 (L) 0.8 - 1.5 MG/DL    GFR est AA >60 >60 ml/min/1.73m2    GFR est non-AA >60 >60 ml/min/1.73m2    Calcium 8.6 8.3 - 10.4 MG/DL   HEPATIC FUNCTION PANEL    Collection Time: 06/14/21  5:46 AM   Result Value Ref Range    Protein, total 5.9 (L) 6.3 - 8.2 g/dL    Albumin 2.0 (L) 3.5 - 5.0 g/dL    Globulin 3.9 (H) 2.3 - 3.5 g/dL    A-G Ratio 0.5 (L) 1.2 - 3.5      Bilirubin, total 0.4 0.2 - 1.1 MG/DL    Bilirubin, direct 0.1 <0.4 MG/DL    Alk.  phosphatase 142 (H) 50 - 136 U/L    AST (SGOT) 21 15 - 37 U/L    ALT (SGPT) 24 12 - 65 U/L   MAGNESIUM    Collection Time: 06/14/21  5:46 AM   Result Value Ref Range    Magnesium 1.9 1.8 - 2.4 mg/dL   GLUCOSE, POC    Collection Time: 06/14/21  5:52 AM   Result Value Ref Range    Glucose (POC) 183 (H) 65 - 100 mg/dL    Performed by [facility]    GLUCOSE, POC    Collection Time: 06/14/21 11:29 AM   Result Value Ref Range    Glucose (POC) 139 (H) 65 - 100 mg/dL    Performed by [facility]        All Micro Results     Procedure Component Value Units Date/Time    CULTURE, BLOOD [[id number]] Collected: 06/10/21 1825    Order Status: Completed Specimen: Blood Updated: 06/14/21 0751     Special Requests: LAC     Culture result: NO GROWTH 4 DAYS       CULTURE, BLOOD [[id number]] Collected: 06/10/21 1825    Order Status: Completed Specimen: Blood Updated: 06/14/21 0751     Special Requests: RAC     Culture result: NO GROWTH 4 DAYS       CULTURE, RESPIRATORY/SPUTUM/BRONCH Saida Hash STAIN [[id number]] Collected: 06/11/21 0930    Order Status: Canceled Specimen: Sputum           EKG Results     Procedure 720 Value Units Date/Time    EKG, 12 LEAD, INITIAL [[id number]] Collected: 06/10/21 1954    Order Status: Completed Updated: 06/10/21 2052     Ventricular Rate 96 BPM      Atrial Rate 96 BPM      P-R Interval 196 ms      QRS Duration 150 ms      Q-T Interval 410 ms      QTC Calculation (Bezet) 517 ms      Calculated P Axis 86 degrees      Calculated R Axis -54 degrees      Calculated T Axis 105 degrees      Diagnosis --       Sinus rhythm with sinus arrhythmia with occasional Premature ventricular   complexes  Biatrial enlargement  Non-specific intra-ventricular conduction block  Abnormal ECG  When compared with ECG of 10-NICHOLE-2021 17:32,  Premature supraventricular complexes are no longer Present  Serial changes of Lateral infarct Present  Confirmed by Matt Archibald ([id number]) on 6/10/2021 8:52:09 PM      EKG [[id number]] Collected: 06/10/21 1732    Order Status: Completed Updated: 06/10/21 1807     Ventricular Rate 89 BPM      Atrial Rate 89 BPM      P-R Interval 198 ms      QRS Duration 152 ms      Q-T Interval 450 ms      QTC Calculation (Bezet) 547 ms      Calculated P Axis 82 degrees      Calculated R Axis -64 degrees      Calculated T Axis 101 degrees      Diagnosis --     !! AGE AND GENDER SPECIFIC ECG ANALYSIS !! Sinus rhythm with Premature supraventricular complexes with occasional   Premature ventricular complexes  Biatrial enlargement  Non-specific intra-ventricular conduction block  anterior infarct, age indeterminant  Abnormal ECG  No previous ECGs available  Confirmed by Makeda Vital MD (), ELLIS RIZVI ([id number]) on 6/10/2021 6:07:12 PM            Other Studies:  No results found.     Current Meds:   Current Facility-Administered Medications   Medication Dose Route Frequency    acetylcysteine (MUCOMYST) 200 mg/mL (20 %) solution 400 mg  400 mg Nebulization QID RT    albuterol (PROVENTIL VENTOLIN) nebulizer solution 2.5 mg  2.5 mg Nebulization QID RT    methylPREDNISolone (PF) (Solu-MEDROL) injection 40 mg  40 mg IntraVENous BID    insulin glargine (LANTUS) injection 20 Units  20 Units SubCUTAneous QHS    acetaminophen (TYLENOL) tablet 650 mg  650 mg Oral Q6H PRN    Or    acetaminophen (TYLENOL) suppository 650 mg  650 mg Rectal Q6H PRN    polyethylene glycol (MIRALAX) packet 17 g  17 g Oral DAILY PRN    ondansetron (ZOFRAN ODT) tablet 4 mg  4 mg Oral Q8H PRN    Or    ondansetron (ZOFRAN) injection 4 mg  4 mg IntraVENous Q6H PRN    insulin lispro (HUMALOG) injection   SubCUTAneous AC&HS    piperacillin-tazobactam (ZOSYN) 3.375 g in 0.9% sodium chloride (MBP/ADV) 100 mL MBP  3.375 g IntraVENous Q8H    metoprolol tartrate (LOPRESSOR) tablet 25 mg  25 mg Oral Q12H    aspirin delayed-release tablet 81 mg  81 mg Oral DAILY    LORazepam (ATIVAN) injection 1 mg  1 mg IntraVENous Q6H PRN    nicotine (NICODERM CQ) 21 mg/24 hr patch 1 Patch  1 Patch TransDERmal Q24H    albuterol (PROVENTIL VENTOLIN) nebulizer solution 2.5 mg  2.5 mg Nebulization Q4H PRN    budesonide (PULMICORT) 500 mcg/2 ml nebulizer suspension  500 mcg Nebulization BID RT    sodium chloride (NS) flush 5-10 mL  5-10 mL IntraVENous Q8H    sodium chloride (NS) flush 5-10 mL  5-10 mL IntraVENous PRN       Problem List:  Hospital Problems as of 6/14/2021 Date Reviewed: 6/11/2021        Codes Class Noted - Resolved POA    COPD exacerbation ([facility].) ICD-10-CM: J44.1  ICD-9-CM: 491.21  6/11/2021 - Present Yes        * (Principal) [facility]iast[facility]um ([facility] ICD-10-CM: J98.2  ICD-9-CM: 518.1  6/10/2021 - Present Yes        Substance abuse ([facility] (Chronic) ICD-10-CM: F19.10  ICD-9-CM: 305.90  6/10/2021 - Present Yes        Acute hyponatremia ICD-10-CM: E87.1  ICD-9-CM: 276.1  6/10/2021 - Present Yes        Uncontrolled type II diabetes mellitus ([facility] ICD-10-CM: E11.65  ICD-9-CM: 250.02  6/10/2021 - Present Yes        Dehydration ICD-10-CM: E86.0  ICD-9-CM: 276.51  6/10/2021 - Present Yes                   Part of this note was written by using a voice dictation software and the note has been proof read but may still contain some grammatical/other typographical errors.     Signed By: Lane Stern MD   [email or web address]] Hospitalist Service    June 14, 2021  3:16 PM

## 2021-06-14 NOTE — PROGRESS NOTES
[facility]  Admission Date: 6/10/2021             Daily Progress Note: 6/14/2021    The patient's chart is reviewed and the patient is discussed with the staff.  [age] S[age] y.o.  male, PMH DM, HTN, tobacco abuse, methamphetamine abuse, Hep C positive in 2018, and cervical disc disorder, seen and evaluated at the request of Kimberli Whelan for pneumomediastinum.  The patient presented to the ED here via EMS with c/o productive cough w/ yellow sputum and shortness of breath X 3 days.  O2 sat on RA was noted to be 88%. CT Chest was performed to r/o PE which revealed no PE but did indicate pneumomediastinum and obstruction of the lower lobar bronchi, likely mucus impaction. WBC has went from 11.6 on admission to 16.1. The patient was started on Zosyn and is on O2 at 3L NC.         The patient was admitted to [facility] in March of this year after presenting to the ED at [facility] with c/o cough, dysphonia, and worsening dyspnea.  Laryngoscopy was performed and demonstrated nodular mass right vocal cord and thick whitish exudate overlying both vocal cords. At discharge from that admission the patient was placed on Augmentin and Nystatin for vocal cord mass vs lagryngeal thrush.  The patient had heavy Haemophilus influenza growth in sputum culture.  Echocardiogram during that admission revealed EF 35-40% with septal hypokinesis and grade 1 diastolic dysfunction.      Subjective:     He is less congested now. Was coughing up large amount of yellowish green material at home. Wife at bedside. He was smoking up until admission.      Current Facility-Administered Medications   Medication Dose Route Frequency    acetylcysteine (MUCOMYST) 200 mg/mL (20 %) solution 400 mg  400 mg Nebulization QID RT    magnesium sulfate 2 g/50 ml IVPB (premix or compounded)  2 g IntraVENous ONCE    potassium chloride (K-DUR, KLOR-CON) SR tablet 20 mEq  20 mEq Oral NOW    insulin glargine (LANTUS) injection 20 Units  20 Units SubCUTAneous QHS    methylPREDNISolone (PF) (Solu-MEDROL) injection 60 mg  60 mg IntraVENous BID    acetaminophen (TYLENOL) tablet 650 mg  650 mg Oral Q6H PRN    Or    acetaminophen (TYLENOL) suppository 650 mg  650 mg Rectal Q6H PRN    polyethylene glycol (MIRALAX) packet 17 g  17 g Oral DAILY PRN    ondansetron (ZOFRAN ODT) tablet 4 mg  4 mg Oral Q8H PRN    Or    ondansetron (ZOFRAN) injection 4 mg  4 mg IntraVENous Q6H PRN    insulin lispro (HUMALOG) injection   SubCUTAneous AC&HS    piperacillin-tazobactam (ZOSYN) 3.375 g in 0.9% sodium chloride (MBP/ADV) 100 mL MBP  3.375 g IntraVENous Q8H    metoprolol tartrate (LOPRESSOR) tablet 25 mg  25 mg Oral Q12H    aspirin delayed-release tablet 81 mg  81 mg Oral DAILY    LORazepam (ATIVAN) injection 1 mg  1 mg IntraVENous Q6H PRN    nicotine (NICODERM CQ) 21 mg/24 hr patch 1 Patch  1 Patch TransDERmal Q24H    albuterol (PROVENTIL VENTOLIN) nebulizer solution 2.5 mg  2.5 mg Nebulization Q4H PRN    budesonide (PULMICORT) 500 mcg/2 ml nebulizer suspension  500 mcg Nebulization BID RT    albuterol (PROVENTIL VENTOLIN) nebulizer solution 2.5 mg  2.5 mg Nebulization Q4H RT    sodium chloride (NS) flush 5-10 mL  5-10 mL IntraVENous Q8H    sodium chloride (NS) flush 5-10 mL  5-10 mL IntraVENous PRN         Objective:     Vitals:    06/14/21 0648 06/14/21 0704 06/14/21 0816 06/14/21 1117   BP: (!) 140/76 129/73  125/67   Pulse: 74 71  93   Resp: 16 17  20   Temp:  98 °F (36.7 °C)  98 °F (36.7 °C)   SpO2: 95% 94% 94% 97%   Weight:       Height:         Intake and Output:   06/12 1901 - 06/14 0700  In: 1160 [P.O.:1160]  Out: 300 [Urine:300]  06/14 0701 - 06/14 1900  In: 308 [I.V.:308]  Out: -     Physical Exam:   Constitutional:  the patient is well developed and in no acute distress  HEENT:  Sclera clear, pupils equal, oral mucosa moist  Lungs: harsh breath sounds on the left from posterior.  Wearing 1 liter cannula  Cardiovascular:  RRR without M,G,R  Abd/GI: soft and non-tender; with positive bowel sounds. Ext: warm without cyanosis. There is no lower leg edema. Musculoskeletal: moves all four extremities with equal strength  Skin:  no jaundice or rashes, no wounds   Neuro: no gross neuro deficits   Musculoskeletal: can ambulate. No deformity  Psychiatric: Calm. Review of Systems - Respiratory ROS: positive for - cough and shortness of breath  Cardiovascular ROS: no chest pain or dyspnea on exertion  Gastrointestinal ROS: no abdominal pain, change in bowel habits, or black or bloody stools   Lines: peripheral IV    CHEST XRAY: none today      LAB  Recent Labs     06/14/21  0546 06/12/21  0829   WBC 7.9 8.3   HGB 13.3* 15.5   HCT 40.1* 46.1    175     Recent Labs     06/14/21  0546 06/12/21  0829 06/11/21  1714   * 135* 132*   K 3.9 3.5 4.1   CL 98 94* 93*   CO2 33* 31 32   * 279* 348*   BUN 20 22 24*   CREA 0.62* 0.77* 0.73*   MG 1.9  --  2.2   ALB 2.0* 1.9*  --          Assessment:  (Medical Decision Making)     Hospital Problems  Date Reviewed: 6/11/2021        Codes Class Noted POA    COPD exacerbation ([facility] ICD-10-CM: J44.1  ICD-9-CM: 491.21  6/11/2021 Yes    No wheezing on exam today    * (Principal) Pneumomediastinum ([facility] ICD-10-CM: J98.2  ICD-9-CM: 518.1  6/10/2021 Yes    Associated with recent cough    Substance abuse ([facility]) (Chronic) ICD-10-CM: F19.10  ICD-9-CM: 305.90  6/10/2021 Yes    noted    Acute hyponatremia ICD-10-CM: E87.1  ICD-9-CM: 276.1  6/10/2021 Yes    Corrected with hydration    Uncontrolled type II diabetes mellitus ([facility] ICD-10-CM: E11.65  ICD-9-CM: 250.02  6/10/2021 Yes    BS up to the 400s    Dehydration ICD-10-CM: E86.0  ICD-9-CM: 276.51  6/10/2021 Yes    As above          Plan:  (Medical Decision Making)   1. BS up to the 400s. Will decrease steroids again today and hopefully change to po tomorrow (NPO today for bronch). On Lantus   2. Bronch today for airway survey.  Continue mucomyst for now  3. Day 5 Zosyn - blood cultures negative. No sputum cultured but wife reports that he was coughing up greenish/yellow material at home. WBC down to normal  4. Smoking cessation recommended. Will need office follow up with PFTs. Suspected COPD - ? Severity. Using albuterol here - no outpatient tx before admission  5. Home lisinopril on hold - watch blood pressure    Mariusz West, CEDRICK      Lungs: no wheezing  Heart:  RRR with no Murmur/Rubs/Gallops    Additional Comments:  Wean steroids and plan for inspection bronch this pm. Cont. Zosyn D # 5 and likely change to po ABX soon    I have spoken with and examined the patient. I agree with the above assessment and plan as documented. Florecita Esparza MD      More than 50% of time documented was spent face-to-face contact with the patient and in the care of the patient on the floor/unit where the patient is located.

## 2021-06-14 NOTE — PROCEDURES
PROCEDURE    Bronchoscopy with airway inspection Dishachristina Lopezau of foreign body. INDICATION   Suspected tracheal tear    EQUIPMENT:  Olympus  Bronchoscope    ANESTHESIA    MAC anesthesia provided by anesthesiology    IMAGING:  CT Chest 6/10/21        AIRWAY INSPECTION    After obtaining informed consent, Olympus  Bronchoscopewas introduced into the trachea without complication. RIGHT    LOCATION NORM/ABNORM DESCRIPTION   Larynx NL    VOCAL CORDS ABNL While, purulent mucus present at the vocal cords. TRACHEA ABNL Large posterior wall tear approximately 1cm below vocal cords. Initially covered with thick strand of purulent material. Mixed in was foreign looking material, either cartilage or less likely vegetable matter. ANA NL    RMSB NL    RUL NL    BI NL    RML NL    RLL NL    SUP SEGM RLL NL    MED BASAL NL    ANTERIOR BASAL NL    LATERAL BASAL NL    POSTERIOR BASAL NL               LEFT    LOCATION NORM/ABNORM DESCRIPTION   LMSB NL    BRIANNA NL    LINGULA NL    LLL NL    SUPERIOR SEG LLL NL    HAROLDO-MEDIAL LLL NL    LATERAL LLL NL    POSTERIOR LLL NL      Attempts were made to remove the mass of purulent material covering the posterior wall tear. It was too large and thick for suction to work. Green followed by orange forceps were used to remove pieces of this with modest results. Pieces of what is suspected of being cartilage were removed and placed in a specimen container. Ultimately a second bronchoscope had to be placed in the mouth, one to apply suction to the end of this strand to hold it in place, while the second was used with forceps to gradually remove pieces of this material. Once this had been done several times the whole thing was able to be removed and was placed in a separate specimen container. Once this had been done a clearer view was established of the size of the tear.  It appeared to be approximately 3cm long and 2cm wide as determined by the bronchoscopic measuring device. There was no visible communication with the esophagus. Then the scope was advanced into the lower airways with scant purulent material present but no other major abnormalities. The following samples were obtained:    Bronchial Wash: trachea: culture    Trachea foreign bodies vs cartilage - formalin to pathology. The procedure was completed without complication and the patient tolerated the procedure well. EBL: None    Recommendations:  Already have retrievable covered metal stents 40x18, 40x20 sizes. Ordering 60x18 and 60x20 as well (50mm lengths are not available) and will work on setting up bronchoscopy with anesthesia hopefully Friday with Dr Mery Hsu MD

## 2021-06-14 NOTE — PROGRESS NOTES
MSN CM:  Patient to have bronch today and plan for discharge tomorrow per MD.  No discharge needs at this time. Case Management will continue to follow.

## 2021-06-14 NOTE — ANESTHESIA PREPROCEDURE EVALUATION
Relevant Problems   No relevant active problems       Anesthetic History               Review of Systems / Medical History  Patient summary reviewed and pertinent labs reviewed    Pulmonary    COPD      Smoker      Comments: Vocal cord mass   Neuro/Psych              Cardiovascular    Hypertension              Exercise tolerance: >4 METS  Comments: TTE 3/2021:  EF 35-40% with septal hypokinesis and grade 1 diastolic dysfunction   GI/Hepatic/Renal       Hepatitis: type C         Endo/Other    Diabetes: type 2         Other Findings   Comments: Substance abuse  Methamphetamine use         Physical Exam    Airway  Mallampati: II  TM Distance: > 6 cm  Neck ROM: normal range of motion        Cardiovascular  Regular rate and rhythm,  S1 and S2 normal,  no murmur, click, rub, or gallop             Dental  No notable dental hx       Pulmonary  Breath sounds clear to auscultation               Abdominal         Other Findings            Anesthetic Plan    ASA: 3  Anesthesia type: total IV anesthesia            Anesthetic plan and risks discussed with: Patient

## 2021-06-14 NOTE — PROGRESS NOTES
Patient returned to room 809, via stretcher; accompanied by transport. Patient alert and oriented x 2, drowsy. Assist x 2 to bed. NAD noted. Encouraged to call for needs. Call light within reach. Will continue to monitor.

## 2021-06-15 ENCOUNTER — APPOINTMENT (OUTPATIENT)
Dept: NON INVASIVE DIAGNOSTICS | Age: 56
DRG: 143 | End: 2021-06-15
Attending: PHYSICIAN ASSISTANT
Payer: COMMERCIAL

## 2021-06-15 PROBLEM — I47.20 V TACH: Chronic | Status: ACTIVE | Noted: 2021-06-15

## 2021-06-15 LAB
ANION GAP SERPL CALC-SCNC: 7 MMOL/L (ref 7–16)
ATRIAL RATE: 78 BPM
BACTERIA SPEC CULT: NORMAL
BACTERIA SPEC CULT: NORMAL
BUN SERPL-MCNC: 20 MG/DL (ref 6–23)
CALCIUM SERPL-MCNC: 8 MG/DL (ref 8.3–10.4)
CALCULATED P AXIS, ECG09: 86 DEGREES
CALCULATED R AXIS, ECG10: -108 DEGREES
CALCULATED T AXIS, ECG11: 77 DEGREES
CHLORIDE SERPL-SCNC: 99 MMOL/L (ref 98–107)
CO2 SERPL-SCNC: 31 MMOL/L (ref 21–32)
CREAT SERPL-MCNC: 1.09 MG/DL (ref 0.8–1.5)
DIAGNOSIS, 93000: NORMAL
ECHO AO ROOT DIAM: 2.8 CM
ECHO AV PEAK GRADIENT: 6 MMHG
ECHO AV PEAK VELOCITY: 119 CM/S
ECHO LA AREA 2C: 14.1 CM2
ECHO LA AREA 4C: 17.6 CM2
ECHO LA MAJOR AXIS: 5.27 CM
ECHO LA MINOR AXIS: 2.86 CM
ECHO LV INTERNAL DIMENSION DIASTOLIC: 4.3 CM (ref 4.2–5.9)
ECHO LV INTERNAL DIMENSION SYSTOLIC: 3.7 CM
ECHO LV IVSD: 1.5 CM (ref 0.6–1)
ECHO LV MASS 2D: 245 G (ref 88–224)
ECHO LV MASS INDEX 2D: 133.1 G/M2 (ref 49–115)
ECHO LV POSTERIOR WALL DIASTOLIC: 1.4 CM (ref 0.6–1)
ECHO LVOT PEAK GRADIENT: 3 MMHG
ECHO RV INTERNAL DIMENSION: 2.5 CM
ECHO RV TAPSE: 2.58 CM (ref 1.5–2)
GLUCOSE BLD STRIP.AUTO-MCNC: 277 MG/DL (ref 65–100)
GLUCOSE BLD STRIP.AUTO-MCNC: 316 MG/DL (ref 65–100)
GLUCOSE BLD STRIP.AUTO-MCNC: 328 MG/DL (ref 65–100)
GLUCOSE BLD STRIP.AUTO-MCNC: 378 MG/DL (ref 65–100)
GLUCOSE BLD STRIP.AUTO-MCNC: 382 MG/DL (ref 65–100)
GLUCOSE SERPL-MCNC: 303 MG/DL (ref 65–100)
MAGNESIUM SERPL-MCNC: 2.2 MG/DL (ref 1.8–2.4)
P-R INTERVAL, ECG05: 190 MS
POTASSIUM SERPL-SCNC: 4.1 MMOL/L (ref 3.5–5.1)
Q-T INTERVAL, ECG07: 448 MS
QRS DURATION, ECG06: 146 MS
QTC CALCULATION (BEZET), ECG08: 510 MS
SERVICE CMNT-IMP: ABNORMAL
SERVICE CMNT-IMP: NORMAL
SERVICE CMNT-IMP: NORMAL
SODIUM SERPL-SCNC: 137 MMOL/L (ref 136–145)
TSH SERPL DL<=0.005 MIU/L-ACNC: 2.42 UIU/ML (ref 0.36–3.74)
VENTRICULAR RATE, ECG03: 78 BPM

## 2021-06-15 PROCEDURE — 74011000250 HC RX REV CODE- 250: Performed by: NURSE PRACTITIONER

## 2021-06-15 PROCEDURE — 74011250637 HC RX REV CODE- 250/637: Performed by: INTERNAL MEDICINE

## 2021-06-15 PROCEDURE — 36415 COLL VENOUS BLD VENIPUNCTURE: CPT

## 2021-06-15 PROCEDURE — 74011250637 HC RX REV CODE- 250/637: Performed by: PHYSICIAN ASSISTANT

## 2021-06-15 PROCEDURE — 99223 1ST HOSP IP/OBS HIGH 75: CPT | Performed by: INTERNAL MEDICINE

## 2021-06-15 PROCEDURE — 94640 AIRWAY INHALATION TREATMENT: CPT

## 2021-06-15 PROCEDURE — 74011250637 HC RX REV CODE- 250/637: Performed by: NURSE PRACTITIONER

## 2021-06-15 PROCEDURE — 74011250636 HC RX REV CODE- 250/636: Performed by: INTERNAL MEDICINE

## 2021-06-15 PROCEDURE — 82962 GLUCOSE BLOOD TEST: CPT

## 2021-06-15 PROCEDURE — 84443 ASSAY THYROID STIM HORMONE: CPT

## 2021-06-15 PROCEDURE — 94760 N-INVAS EAR/PLS OXIMETRY 1: CPT

## 2021-06-15 PROCEDURE — 74011250637 HC RX REV CODE- 250/637: Performed by: HOSPITALIST

## 2021-06-15 PROCEDURE — 74011250636 HC RX REV CODE- 250/636: Performed by: NURSE PRACTITIONER

## 2021-06-15 PROCEDURE — 80048 BASIC METABOLIC PNL TOTAL CA: CPT

## 2021-06-15 PROCEDURE — 74011000258 HC RX REV CODE- 258: Performed by: NURSE PRACTITIONER

## 2021-06-15 PROCEDURE — 65660000000 HC RM CCU STEPDOWN

## 2021-06-15 PROCEDURE — 99232 SBSQ HOSP IP/OBS MODERATE 35: CPT | Performed by: INTERNAL MEDICINE

## 2021-06-15 PROCEDURE — 74011000250 HC RX REV CODE- 250: Performed by: INTERNAL MEDICINE

## 2021-06-15 PROCEDURE — 74011636637 HC RX REV CODE- 636/637: Performed by: INTERNAL MEDICINE

## 2021-06-15 PROCEDURE — 74011250636 HC RX REV CODE- 250/636: Performed by: HOSPITALIST

## 2021-06-15 PROCEDURE — 83735 ASSAY OF MAGNESIUM: CPT

## 2021-06-15 PROCEDURE — 74011636637 HC RX REV CODE- 636/637: Performed by: HOSPITALIST

## 2021-06-15 PROCEDURE — 74011000258 HC RX REV CODE- 258: Performed by: HOSPITALIST

## 2021-06-15 PROCEDURE — 74011000250 HC RX REV CODE- 250: Performed by: EMERGENCY MEDICINE

## 2021-06-15 PROCEDURE — C8929 TTE W OR WO FOL WCON,DOPPLER: HCPCS

## 2021-06-15 RX ORDER — METOPROLOL SUCCINATE 50 MG/1
50 TABLET, EXTENDED RELEASE ORAL DAILY
Status: DISCONTINUED | OUTPATIENT
Start: 2021-06-15 | End: 2021-06-18 | Stop reason: HOSPADM

## 2021-06-15 RX ORDER — GUAIFENESIN 600 MG/1
1200 TABLET, EXTENDED RELEASE ORAL EVERY 12 HOURS
Status: DISCONTINUED | OUTPATIENT
Start: 2021-06-15 | End: 2021-06-17

## 2021-06-15 RX ORDER — AMIODARONE HYDROCHLORIDE 200 MG/1
400 TABLET ORAL 2 TIMES DAILY
Status: DISCONTINUED | OUTPATIENT
Start: 2021-06-15 | End: 2021-06-18 | Stop reason: HOSPADM

## 2021-06-15 RX ORDER — CALCIUM CARBONATE 200(500)MG
400 TABLET,CHEWABLE ORAL
Status: DISCONTINUED | OUTPATIENT
Start: 2021-06-15 | End: 2021-06-18 | Stop reason: HOSPADM

## 2021-06-15 RX ADMIN — ALBUTEROL SULFATE 2.5 MG: 2.5 SOLUTION RESPIRATORY (INHALATION) at 15:59

## 2021-06-15 RX ADMIN — INSULIN LISPRO 12 UNITS: 100 INJECTION, SOLUTION INTRAVENOUS; SUBCUTANEOUS at 21:44

## 2021-06-15 RX ADMIN — GUAIFENESIN 1200 MG: 600 TABLET ORAL at 12:01

## 2021-06-15 RX ADMIN — INSULIN LISPRO 9 UNITS: 100 INJECTION, SOLUTION INTRAVENOUS; SUBCUTANEOUS at 17:37

## 2021-06-15 RX ADMIN — ALBUTEROL SULFATE 2.5 MG: 2.5 SOLUTION RESPIRATORY (INHALATION) at 11:21

## 2021-06-15 RX ADMIN — ASPIRIN 81 MG: 81 TABLET ORAL at 08:26

## 2021-06-15 RX ADMIN — METHYLPREDNISOLONE SODIUM SUCCINATE 40 MG: 40 INJECTION, POWDER, FOR SOLUTION INTRAMUSCULAR; INTRAVENOUS at 08:25

## 2021-06-15 RX ADMIN — PERFLUTREN 1 ML: 6.52 INJECTION, SUSPENSION INTRAVENOUS at 15:30

## 2021-06-15 RX ADMIN — BUDESONIDE 500 MCG: 0.5 INHALANT RESPIRATORY (INHALATION) at 19:46

## 2021-06-15 RX ADMIN — METOPROLOL SUCCINATE 50 MG: 50 TABLET, EXTENDED RELEASE ORAL at 12:01

## 2021-06-15 RX ADMIN — CALCIUM CARBONATE (ANTACID) CHEW TAB 500 MG 400 MG: 500 CHEW TAB at 06:20

## 2021-06-15 RX ADMIN — LISINOPRIL 10 MG: 5 TABLET ORAL at 08:27

## 2021-06-15 RX ADMIN — BUDESONIDE 500 MCG: 0.5 INHALANT RESPIRATORY (INHALATION) at 07:26

## 2021-06-15 RX ADMIN — ALBUTEROL SULFATE 2.5 MG: 2.5 SOLUTION RESPIRATORY (INHALATION) at 19:46

## 2021-06-15 RX ADMIN — METOPROLOL TARTRATE 25 MG: 25 TABLET, FILM COATED ORAL at 08:26

## 2021-06-15 RX ADMIN — GUAIFENESIN 1200 MG: 600 TABLET ORAL at 21:44

## 2021-06-15 RX ADMIN — PIPERACILLIN SODIUM AND TAZOBACTAM SODIUM 3.38 G: 3; .375 INJECTION, POWDER, LYOPHILIZED, FOR SOLUTION INTRAVENOUS at 21:48

## 2021-06-15 RX ADMIN — SODIUM CHLORIDE 5 ML: 9 INJECTION, SOLUTION INTRAMUSCULAR; INTRAVENOUS; SUBCUTANEOUS at 05:51

## 2021-06-15 RX ADMIN — INSULIN LISPRO 15 UNITS: 100 INJECTION, SOLUTION INTRAVENOUS; SUBCUTANEOUS at 08:26

## 2021-06-15 RX ADMIN — SODIUM CHLORIDE 10 ML: 9 INJECTION, SOLUTION INTRAMUSCULAR; INTRAVENOUS; SUBCUTANEOUS at 21:45

## 2021-06-15 RX ADMIN — PIPERACILLIN SODIUM AND TAZOBACTAM SODIUM 3.38 G: 3; .375 INJECTION, POWDER, LYOPHILIZED, FOR SOLUTION INTRAVENOUS at 05:51

## 2021-06-15 RX ADMIN — ACETYLCYSTEINE 400 MG: 200 SOLUTION ORAL; RESPIRATORY (INHALATION) at 07:25

## 2021-06-15 RX ADMIN — INSULIN GLARGINE 20 UNITS: 100 INJECTION, SOLUTION SUBCUTANEOUS at 21:44

## 2021-06-15 RX ADMIN — SODIUM CHLORIDE 10 ML: 9 INJECTION, SOLUTION INTRAMUSCULAR; INTRAVENOUS; SUBCUTANEOUS at 13:53

## 2021-06-15 RX ADMIN — AMIODARONE HYDROCHLORIDE 400 MG: 200 TABLET ORAL at 17:37

## 2021-06-15 RX ADMIN — AMIODARONE HYDROCHLORIDE 400 MG: 200 TABLET ORAL at 12:00

## 2021-06-15 RX ADMIN — INSULIN LISPRO 12 UNITS: 100 INJECTION, SOLUTION INTRAVENOUS; SUBCUTANEOUS at 12:01

## 2021-06-15 RX ADMIN — ALBUTEROL SULFATE 2.5 MG: 2.5 SOLUTION RESPIRATORY (INHALATION) at 07:26

## 2021-06-15 RX ADMIN — PIPERACILLIN SODIUM AND TAZOBACTAM SODIUM 3.38 G: 3; .375 INJECTION, POWDER, LYOPHILIZED, FOR SOLUTION INTRAVENOUS at 13:52

## 2021-06-15 NOTE — PROGRESS NOTES
Pt resting in bed with eyes closed. Pt awakens when spoken to. Pt on RA. Pt denies pain or distress at this time. Call light in reach, will monitor.

## 2021-06-15 NOTE — PROGRESS NOTES
[facility]ist Progress Note     Name:  Brenda Fairchild  Age:55 y.o. Sex:male   :  1965    MRN:  [id number]     Admit Date:  6/10/2021    Reason for Admission:  Pneumomediastinum [facility]) [J98.2]    Assessment & Plan       Acute respiratory failure with COPD exacerbation, pneumonia, tracheal defect with pneumomediastinum:  · Wean O2 as tolerant, on room air   · D6 zosyn  · IV steroid taper completed   · Scheduled nebs, pulmicort  ·  bronch with stent for -18  · appreciate pulmonary  · Needs smoking cessation       Dm2:  · SSI  · continued  lantus         Hyponatremia:  · Corrected for hyperglycemia   · Trend BMP-improved       VTACH, HFrEF:  · followup remote tele   · Follow potassium/ magnesium- supplement   · Continued metoprolol /  Lisinopril  · Amiodarone added  · Cardiology following   · ECHO pending       Elevated LFTS, HEPC:  · Trend LFTS      Hypokalemia:  · Replace and repeat lab         Diet:  DIET ADULT  DVT PPx: SCD  GI Ppx:  none  Code: Full Code    Dispo/Discharge Planning: pending     Hospital Course/Subjective:       Mr. Liana Allen is a 62 yo male PMH of COPD, DM2, HTN, tobacco use, methamphetamine use, HEPC, HFrEF, VTACH admitted with COPD exacerbation. CTA chest shows defect or rupture of posterior tracheal wall near thoracic inlet with air into the upper mediastinum and soft tissues, obstruction of bilateral lower bronchi. He has been seen by pulmonary and placed on nebs, pulmicort, IV steroids and zosyn. BC NGTD. He is s/p bronch 21 showing purulent mucus at vocal cords and large posterior wall tear 1 cm below vocal cords. He is pending tracheal stent placement for 21. Cardiology following for recurrent VTACH. Amiodarone added. ECHO ordered.        Discharge plans pending         Subjective/24 hr Events (06/15/21):   friend present , ate ok, no dyspnea,  No palpitations or chest pain, had BM,     Objective:     Patient Vitals for the past 24 hrs:   Temp Pulse Resp BP SpO2   06/15/21 1200  77      06/15/21 1133 98 °F (36.7 °C) 65 18 (!) 149/68 99 %   06/15/21 0749 98.2 °F (36.8 °C) 78 18 (!) 155/81 99 %   06/15/21 0726     96 %   06/15/21 0330 98 °F (36.7 °C) 82 18 125/70 99 %   06/14/21 2310 98.2 °F (36.8 °C) 75 16 (!) 141/75 96 %   06/14/21 2028     95 %   06/14/21 1921 98.3 °F (36.8 °C) 77 16 122/69 95 %   06/14/21 1618 98.5 °F (36.9 °C) 82 20 (!) 140/73 97 %   06/14/21 1557  78 16 133/81 95 %   06/14/21 1548  73 15 127/78 97 %   06/14/21 1537 98 °F (36.7 °C) 72 12 (!) 115/59 99 %     Oxygen Therapy  O2 Sat (%): 99 % (06/15/21 1133)  Pulse via Oximetry: 86 beats per minute (06/14/21 2028)  O2 Device: None (Room air) (06/15/21 0805)  Skin Assessment: Clean, dry, & intact (06/14/21 2028)  O2 Flow Rate (L/min): 4 l/min (06/14/21 1537)  FIO2 (%): 24 % (06/13/21 2020)    Body mass index is 20.43 kg/m². Physical Exam:   General:     Alert , no distress    Lungs:   Clear   Cardiac:   RRR, Normal S1 and S2. No S3, S4 or murmurs. No edema   Neuro:   nonfocal        Data Review:  I have reviewed all labs, meds, and studies from the last 24 hours:    Labs:  Recent Results (from the past 24 hour(s))   CULTURE, RESPIRATORY/SPUTUM/BRONCH W GRAM STAIN    Collection Time: 06/14/21  3:20 PM    Specimen: Bronchial Washing; Sputum   Result Value Ref Range    Special Requests: NO SPECIAL REQUESTS      GRAM STAIN 10 TO 45 WBC'S/OIF     GRAM STAIN NO EPITHELIAL CELLS SEEN      GRAM STAIN FEW GRAM POSITIVE COCCI      GRAM STAIN FEW YEAST      Culture result:        NO GROWTH AFTER SHORT PERIOD OF INCUBATION. FURTHER RESULTS TO FOLLOW AFTER OVERNIGHT INCUBATION.    GLUCOSE, POC    Collection Time: 06/14/21  4:17 PM   Result Value Ref Range    Glucose (POC) 112 (H) 65 - 100 mg/dL    Performed by [address] [place] [address], POC    Collection Time: 06/14/21  9:03 PM   Result Value Ref Range    Glucose (POC) 254 (H) 65 - 100 mg/dL    Performed by [email or web address]    EKG, 12 LEAD, SUBSEQUENT Collection Time: 06/15/21  1:29 AM   Result Value Ref Range    Ventricular Rate 78 BPM    Atrial Rate 78 BPM    P-R Interval 190 ms    QRS Duration 146 ms    Q-T Interval 448 ms    QTC Calculation (Bezet) 510 ms    Calculated P Axis 86 degrees    Calculated R Axis -108 degrees    Calculated T Axis 77 degrees    Diagnosis       Sinus rhythm with Premature atrial complexes  Non-specific intra-ventricular conduction block  Possible Lateral infarct (cited on or before 10-NICHOLE-2021)  Abnormal ECG  When compared with ECG of 10-NICHOLE-2021 19:54,  Premature ventricular complexes are no longer Present  Premature atrial complexes are now Present  Criteria for Inferior infarct are no longer Present  Serial changes of Lateral infarct Present  Confirmed by Bari Rodrigues MD (), ELLIS RIZVI ([id number]) on 6/15/2021 7:04:28 AM     METABOLIC PANEL, BASIC    Collection Time: 06/15/21  2:33 AM   Result Value Ref Range    Sodium 137 136 - 145 mmol/L    Potassium 4.1 3.5 - 5.1 mmol/L    Chloride 99 98 - 107 mmol/L    CO2 31 21 - 32 mmol/L    Anion gap 7 7 - 16 mmol/L    Glucose 303 (H) 65 - 100 mg/dL    BUN 20 6 - 23 MG/DL    Creatinine 1.09 0.8 - 1.5 MG/DL    GFR est AA >60 >60 ml/min/1.73m2    GFR est non-AA >60 >60 ml/min/1.73m2    Calcium 8.0 (L) 8.3 - 10.4 MG/DL   MAGNESIUM    Collection Time: 06/15/21  2:33 AM   Result Value Ref Range    Magnesium 2.2 1.8 - 2.4 mg/dL   TSH 3RD GENERATION    Collection Time: 06/15/21  2:33 AM   Result Value Ref Range    TSH 2.420 0.358 - 3.740 uIU/mL   GLUCOSE, POC    Collection Time: 06/15/21  5:57 AM   Result Value Ref Range    Glucose (POC) 382 (H) 65 - 100 mg/dL    Performed by [facility]    GLUCOSE, POC    Collection Time: 06/15/21  8:12 AM   Result Value Ref Range    Glucose (POC) 378 (H) 65 - 100 mg/dL    Performed by [facility]    GLUCOSE, POC    Collection Time: 06/15/21 11:09 AM   Result Value Ref Range    Glucose (POC) 328 (H) 65 - 100 mg/dL    Performed by [facility]        All Micro Results     Procedure Component Value Units Date/Time    CULTURE, RESPIRATORY/SPUTUM/BRONCH Cordella Kedar [[id number]] Collected: 06/14/21 1520    Order Status: Completed Specimen: Sputum from Bronchial Washing Updated: 06/15/21 0815     Special Requests: NO SPECIAL REQUESTS        GRAM STAIN 10 TO 39 WBC'S/OIF      NO EPITHELIAL CELLS SEEN         FEW GRAM POSITIVE COCCI         FEW YEAST        Culture result:       NO GROWTH AFTER SHORT PERIOD OF INCUBATION. FURTHER RESULTS TO FOLLOW AFTER OVERNIGHT INCUBATION.           CULTURE, BLOOD [[id number]] Collected: 06/10/21 1825    Order Status: Completed Specimen: Blood Updated: 06/15/21 0719     Special Requests: RAC     Culture result: NO GROWTH 5 DAYS       CULTURE, BLOOD [[id number]] Collected: 06/10/21 1825    Order Status: Completed Specimen: Blood Updated: 06/15/21 0719     Special Requests: LAC     Culture result: NO GROWTH 5 DAYS       CULTURE, RESPIRATORY/SPUTUM/BRONCH [place] Cover STAIN [[id number]] Collected: 06/11/21 0930    Order Status: Canceled Specimen: Sputum           EKG Results     Procedure 720 Value Units Date/Time    EKG, 12 LEAD, SUBSEQUENT [[id number]] Collected: 06/15/21 0129    Order Status: Completed Updated: 06/15/21 0725     Ventricular Rate 78 BPM      Atrial Rate 78 BPM      P-R Interval 190 ms      QRS Duration 146 ms      Q-T Interval 448 ms      QTC Calculation (Bezet) 510 ms      Calculated P Axis 86 degrees      Calculated R Axis -108 degrees      Calculated T Axis 77 degrees      Diagnosis --     Sinus rhythm with Premature atrial complexes  Non-specific intra-ventricular conduction block  Possible Lateral infarct (cited on or before 10-NICHOLE-2021)  Abnormal ECG  When compared with ECG of 10-NICHOLE-2021 19:54,  Premature ventricular complexes are no longer Present  Premature atrial complexes are now Present  Criteria for Inferior infarct are no longer Present  Serial changes of Lateral infarct Present  Confirmed by Leanna Gil MD (), ELLIS RIZVI ([id number]) on 6/15/2021 7:25:48 AM      EKG, 12 LEAD, INITIAL [[id number]] Collected: 06/10/21 1954    Order Status: Completed Updated: 06/10/21 2052     Ventricular Rate 96 BPM      Atrial Rate 96 BPM      P-R Interval 196 ms      QRS Duration 150 ms      Q-T Interval 410 ms      QTC Calculation (Bezet) 517 ms      Calculated P Axis 86 degrees      Calculated R Axis -54 degrees      Calculated T Axis 105 degrees      Diagnosis --       Sinus rhythm with sinus arrhythmia with occasional Premature ventricular   complexes  Biatrial enlargement  Non-specific intra-ventricular conduction block  Abnormal ECG  When compared with ECG of 10-NICHOLE-2021 17:32,  Premature supraventricular complexes are no longer Present  Serial changes of Lateral infarct Present  Confirmed by Tiffanie Evans ([id number]) on 6/10/2021 8:52:09 PM      EKG [[id number]] Collected: 06/10/21 1732    Order Status: Completed Updated: 06/10/21 1807     Ventricular Rate 89 BPM      Atrial Rate 89 BPM      P-R Interval 198 ms      QRS Duration 152 ms      Q-T Interval 450 ms      QTC Calculation (Bezet) 547 ms      Calculated P Axis 82 degrees      Calculated R Axis -64 degrees      Calculated T Axis 101 degrees      Diagnosis --     !! AGE AND GENDER SPECIFIC ECG ANALYSIS !! Sinus rhythm with Premature supraventricular complexes with occasional   Premature ventricular complexes  Biatrial enlargement  Non-specific intra-ventricular conduction block  anterior infarct, age indeterminant  Abnormal ECG  No previous ECGs available  Confirmed by [facility]  MD ()ELLIS ([id number]) on 6/10/2021 6:07:12 PM            Other Studies:  No results found.     Current Meds:   Current Facility-Administered Medications   Medication Dose Route Frequency    calcium carbonate (TUMS) chewable tablet 400 mg [elemental]  400 mg Oral TID PRN    guaiFENesin ER (MUCINEX) tablet 1,200 mg  1,200 mg Oral Q12H    metoprolol succinate (TOPROL-XL) XL tablet 50 mg  50 mg Oral DAILY    amiodarone (CORDARONE) tablet 400 mg  400 mg Oral BID    albuterol (PROVENTIL VENTOLIN) nebulizer solution 2.5 mg  2.5 mg Nebulization QID RT    lisinopriL (PRINIVIL, ZESTRIL) tablet 10 mg  10 mg Oral DAILY    insulin glargine (LANTUS) injection 20 Units  20 Units SubCUTAneous QHS    acetaminophen (TYLENOL) tablet 650 mg  650 mg Oral Q6H PRN    Or    acetaminophen (TYLENOL) suppository 650 mg  650 mg Rectal Q6H PRN    polyethylene glycol (MIRALAX) packet 17 g  17 g Oral DAILY PRN    ondansetron (ZOFRAN ODT) tablet 4 mg  4 mg Oral Q8H PRN    Or    ondansetron (ZOFRAN) injection 4 mg  4 mg IntraVENous Q6H PRN    insulin lispro (HUMALOG) injection   SubCUTAneous AC&HS    piperacillin-tazobactam (ZOSYN) 3.375 g in 0.9% sodium chloride (MBP/ADV) 100 mL MBP  3.375 g IntraVENous Q8H    aspirin delayed-release tablet 81 mg  81 mg Oral DAILY    LORazepam (ATIVAN) injection 1 mg  1 mg IntraVENous Q6H PRN    nicotine (NICODERM CQ) 21 mg/24 hr patch 1 Patch  1 Patch TransDERmal Q24H    albuterol (PROVENTIL VENTOLIN) nebulizer solution 2.5 mg  2.5 mg Nebulization Q4H PRN    budesonide (PULMICORT) 500 mcg/2 ml nebulizer suspension  500 mcg Nebulization BID RT    sodium chloride (NS) flush 5-10 mL  5-10 mL IntraVENous Q8H    sodium chloride (NS) flush 5-10 mL  5-10 mL IntraVENous PRN       Problem List:  Hospital Problems as of 6/15/2021 Date Reviewed: 6/11/2021        Codes Class Noted - Resolved POA    V tach ([facility] (Chronic) ICD-10-CM: I47.2  ICD-9-CM: 427.1  6/15/2021 - Present Yes        * (Principal) Laceration of trachea ICD-10-CM: S11.021A  ICD-9-CM: 874.02  6/14/2021 - Present Unknown        COPD exacerbation ([facility] ICD-10-CM: J44.1  ICD-9-CM: 491.21  6/11/2021 - Present Yes        Pneumomediastinum ([facility] ICD-10-CM: J98.2  ICD-9-CM: 518.1  6/10/2021 - Present Yes        Substance abuse (HCC) (Chronic) ICD-10-CM: F19.10  ICD-9-CM: 305.90  6/10/2021 - Present Yes        Acute hyponatremia ICD-10-CM: E87.1  ICD-9-CM: 276.1  6/10/2021 - Present Yes        Uncontrolled type II diabetes mellitus ([facility] ICD-10-CM: E11.65  ICD-9-CM: 250.02  6/10/2021 - Present Yes        Dehydration ICD-10-CM: E86.0  ICD-9-CM: 276.51  6/10/2021 - Present Yes                   Part of this note was written by using a voice dictation software and the note has been proof read but may still contain some grammatical/other typographical errors.     Signed By: Tasneem Pereira MD   [employer] [facility]    Jessica 15, 2021  3:16 PM

## 2021-06-15 NOTE — PROGRESS NOTES
Pt sitting in bed. No distress noted at this time. Pt encouraged to call for assistance if needed call light in reach, will monitor.

## 2021-06-15 NOTE — PROGRESS NOTES
Jacob Darby  Admission Date: 6/10/2021             Daily Progress Note: 6/15/2021    The patient's chart is reviewed and the patient is discussed with the staff.  Zeinab boswell.  male, PMH DM, HTN, tobacco abuse, methamphetamine abuse, Hep C positive in 2018, and cervical disc disorder, seen and evaluated at the request of Angel Ayers for pneumomediastinum.  The patient presented to the ED here via EMS with c/o productive cough w/ yellow sputum and shortness of breath X 3 days.  O2 sat on RA was noted to be 88%. CT Chest was performed to r/o PE which revealed no PE but did indicate pneumomediastinum and obstruction of the lower lobar bronchi, likely mucus impaction. WBC has went from 11.6 on admission to 16.1. The patient was started on Zosyn and is on O2 at 3L NC.         The patient was admitted to [facility] in March of this year after presenting to the ED at [facility] with c/o cough, dysphonia, and worsening dyspnea.  Laryngoscopy was performed and demonstrated nodular mass right vocal cord and thick whitish exudate overlying both vocal cords. At discharge from that admission the patient was placed on Augmentin and Nystatin for vocal cord mass vs lagryngeal thrush.  The patient had heavy Haemophilus influenza growth in sputum culture.  Echocardiogram during that admission revealed EF 35-40% with septal hypokinesis and grade 1 diastolic dysfunction.  ventricular ectopy noted 6/15 - cardiology consulted. Bronch on 6/15 - 3X2 cm posterior wall tear noted 1 cm below vocal cords but no visible communication seen. Purulent material removed with ? Foreign body. Material sent for path. Plans for stent - ? Friday. Subjective:     He is coughing up minimal amounts of mucus. Wife at bedside. Discussed results of bronch yesterday. Reported  Ventricular ectopy overnight - cardiology to see.       Current Facility-Administered Medications   Medication Dose Route Frequency    calcium carbonate (TUMS) chewable tablet 400 mg [elemental]  400 mg Oral TID PRN    guaiFENesin ER (MUCINEX) tablet 1,200 mg  1,200 mg Oral Q12H    albuterol (PROVENTIL VENTOLIN) nebulizer solution 2.5 mg  2.5 mg Nebulization QID RT    lisinopriL (PRINIVIL, ZESTRIL) tablet 10 mg  10 mg Oral DAILY    insulin glargine (LANTUS) injection 20 Units  20 Units SubCUTAneous QHS    acetaminophen (TYLENOL) tablet 650 mg  650 mg Oral Q6H PRN    Or    acetaminophen (TYLENOL) suppository 650 mg  650 mg Rectal Q6H PRN    polyethylene glycol (MIRALAX) packet 17 g  17 g Oral DAILY PRN    ondansetron (ZOFRAN ODT) tablet 4 mg  4 mg Oral Q8H PRN    Or    ondansetron (ZOFRAN) injection 4 mg  4 mg IntraVENous Q6H PRN    insulin lispro (HUMALOG) injection   SubCUTAneous AC&HS    piperacillin-tazobactam (ZOSYN) 3.375 g in 0.9% sodium chloride (MBP/ADV) 100 mL MBP  3.375 g IntraVENous Q8H    metoprolol tartrate (LOPRESSOR) tablet 25 mg  25 mg Oral Q12H    aspirin delayed-release tablet 81 mg  81 mg Oral DAILY    LORazepam (ATIVAN) injection 1 mg  1 mg IntraVENous Q6H PRN    nicotine (NICODERM CQ) 21 mg/24 hr patch 1 Patch  1 Patch TransDERmal Q24H    albuterol (PROVENTIL VENTOLIN) nebulizer solution 2.5 mg  2.5 mg Nebulization Q4H PRN    budesonide (PULMICORT) 500 mcg/2 ml nebulizer suspension  500 mcg Nebulization BID RT    sodium chloride (NS) flush 5-10 mL  5-10 mL IntraVENous Q8H    sodium chloride (NS) flush 5-10 mL  5-10 mL IntraVENous PRN         Objective:     Vitals:    06/14/21 2028 06/14/21 2310 06/15/21 0330 06/15/21 0749   BP:  (!) 141/75 125/70 (!) 155/81   Pulse:  75 82 78   Resp:  16 18 18   Temp:  98.2 °F (36.8 °C) 98 °F (36.7 °C) 98.2 °F (36.8 °C)   SpO2: 95% 96% 99% 99%   Weight:       Height:         Intake and Output:   06/13 1901 - 06/15 0700  In: 2028 [P.O.:1120; I.V.:908]  Out: 2025 [Urine:2025]  No intake/output data recorded.     Physical Exam:   Constitutional:  the patient is well developed and in no acute distress  HEENT:  Sclera clear, pupils equal, oral mucosa moist  Lungs:  Less congested breath sounds on exam today. No wheezing. Now on room air  Cardiovascular:  RRR without M,G,R. Sinus with ventricular ectopy per telemetry  Abd/GI: soft and non-tender; with positive bowel sounds. Ext: warm without cyanosis. There is no lower leg edema. Musculoskeletal: moves all four extremities with equal strength  Skin:  no jaundice or rashes, no wounds   Neuro: no gross neuro deficits   Musculoskeletal: can ambulate. No deformity  Psychiatric: Calm.      Review of Systems - Respiratory ROS: positive for - less cough and shortness of breath  Cardiovascular ROS: no chest pain or dyspnea on exertion  Gastrointestinal ROS: no abdominal pain, change in bowel habits, or black or bloody stools   Lines: peripheral IV    CHEST XRAY: none today      LAB  Recent Labs     06/14/21  0546   WBC 7.9   HGB 13.3*   HCT 40.1*        Recent Labs     06/15/21  0233 06/14/21  0546    136*   K 4.1 3.9   CL 99 98   CO2 31 33*   * 150*   BUN 20 20   CREA 1.09 0.62*   MG 2.2 1.9   ALB  --  2.0*         Assessment:  (Medical Decision Making)     Hospital Problems  Date Reviewed: 6/11/2021        Codes Class Noted POA    COPD exacerbation ([facility] ICD-10-CM: J44.1  ICD-9-CM: 491.21  6/11/2021 Yes    No wheezing on exam today    * (Principal) [facility]omediastinum ([facility] ICD-10-CM: J98.2  ICD-9-CM: 518.1  6/10/2021 Yes    Large tear found with bronch yesterday    Substance abuse (HCC) (Chronic) ICD-10-CM: F19.10  ICD-9-CM: 305.90  6/10/2021 Yes    noted    Acute hyponatremia ICD-10-CM: E87.1  ICD-9-CM: 276.1  6/10/2021 Yes    Corrected with hydration    Uncontrolled type II diabetes mellitus ([facility] ICD-10-CM: E11.65  ICD-9-CM: 250.02  6/10/2021 Yes    BS up to the 400s with steroids    Dehydration ICD-10-CM: E86.0  ICD-9-CM: 276.51  6/10/2021 Yes    As above        Laceration of trachea, need stent this week      Plan:  (Medical Decision Making)   1. BS up to the 400s. Will stop steroids today - continue pulmicort. He does not use any inhalers at home but has suspected COPD. On Lantus (he takes at home). 2. Bronch yesterday - large 3X2 cm tear noted below vocal cords. Stent ordered with hopeful placement on Friday. Will stop Mucomyst and add Mucinex to help with secretion clearance  3. Day 6 Zosyn - blood cultures negative. Bronch washings sent for culture yesterday - purulent secretions noted along tear and in lower airways  WBC down to normal.   4. Smoking cessation recommended. Will need office follow up with PFTs. Suspected COPD - ? Severity. Using albuterol here   5. Home lisinopril on hold - will restart with noted hypertension - watch blood pressure  6. Cardiology to see re: ventricular ectopy. Recent echo at [address] with reduced EF. K+ 4.1/Mg 2.2    Greg Gamble, CEDRICK    Lungs: CTA  Heart:  RRR with no Murmur/Rubs/Gallops    Additional Comments:  Bronch findings are noted. Plan for stent placement on Friday. Cont. BD and Zosyn pending culture from yestrday    I have spoken with and examined the patient. I agree with the above assessment and plan as documented. Caroline Anaya MD      More than 50% of time documented was spent face-to-face contact with the patient and in the care of the patient on the floor/unit where the patient is located.

## 2021-06-15 NOTE — PROGRESS NOTES
Problem: Falls - Risk of  Goal: *Absence of Falls  Description: Document Arianne Borden Fall Risk and appropriate interventions in the flowsheet. Outcome: Progressing Towards Goal  Note: Fall Risk Interventions:  Mobility Interventions: Bed/chair exit alarm, Strengthening exercises (ROM-active/passive)         Medication Interventions: Assess postural VS orthostatic hypotension, Bed/chair exit alarm, Patient to call before getting OOB    Elimination Interventions: Call light in reach              Problem: Patient Education: Go to Patient Education Activity  Goal: Patient/Family Education  Outcome: Progressing Towards Goal     Problem: Pressure Injury - Risk of  Goal: *Prevention of pressure injury  Description: Document Kenneth Scale and appropriate interventions in the flowsheet. Outcome: Progressing Towards Goal  Note: Pressure Injury Interventions:  Sensory Interventions: Assess changes in LOC, Discuss PT/OT consult with provider, Keep linens dry and wrinkle-free, Maintain/enhance activity level, Minimize linen layers, Pressure redistribution bed/mattress (bed type), Turn and reposition approx. every two hours (pillows and wedges if needed)    Moisture Interventions: Minimize layers    Activity Interventions: PT/OT evaluation    Mobility Interventions: Assess need for specialty bed, HOB 30 degrees or less, Pressure redistribution bed/mattress (bed type), Turn and reposition approx.  every two hours(pillow and wedges)    Nutrition Interventions: Document food/fluid/supplement intake    Friction and Shear Interventions: Minimize layers                Problem: Patient Education: Go to Patient Education Activity  Goal: Patient/Family Education  Outcome: Progressing Towards Goal

## 2021-06-15 NOTE — H&P
[facility] Cardiology Initial Cardiac Evaluation   Primary Cardiologist: [facility]  AttendingCardiologist: Dr. Geraldine Ann     Date of  Admission: 6/10/2021  5:12 PM     HPI:  Sujata May is a 63 y.o. male with h/o poorly controlled DM2, HTN, COPD, HFrEF 35-40% by recent echo (at [address] [facility] 3/2021), NSVT, Hepatitis C with cirrhosis, substance abuse and heavy tobacco use who presented to [facility] ED on 6/10/21 with c/o cough and shortness of breath. Family reported Pt had decreased PO intake, weakness and fatigue. CT chest showed pneumomediastinum, Na 121 and elevated lactic acid. Hospitalist admitted patient and Pulmonary was consulted. Chart review revealed recent admission at [address] [facility] 3/2021 with respiratory failure which required intubation. 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 He was evaluated by ENT and treated with diflucan. During his hospitalization he had NSVT and an echocardiogram was performed which showed decreased EF 35-40% with septal hypokinesis. Medical management was planned due to meth use and non compliance. He was discharged home on BB, ACE-I with planned out patient follow up and if he proved compliance, [facility] would pursue stress test/LHC for ischemic work up. Pt had bronchoscopy 6/14 with finding of 3 X 2 cm posterior wall tear noted 1 cm below vocal cords but no visible communication, purulent material removed with possible foreign body- material sent for pathology and plans for stent  Possibly Friday. Pt has continued to have runs of NSVT and cardiology was asked to evaluate. Pt denies CP, SOB, palpitations, LE swelling, orthopnea or syncope. He states he smokes 1.5-2 packs per day (female  says 3 ppd) and methamphetamine use last within the last month. Past Medical History:   Diagnosis Date    Diabetes mellitus ([address]     HCV (hepatitis C virus)     HTN (hypertension)     Substance abuse ([facility]     Vocal cord mass       History reviewed.  No pertinent surgical history. No Known Allergies   Social History     Socioeconomic History    Marital status: SINGLE     Spouse name: Not on file    Number of children: Not on file    Years of education: Not on file    Highest education level: Not on file   Occupational History    Not on file   Tobacco Use    Smoking status: Former Smoker     Quit date: 2021     Years since quittin.0    Smokeless tobacco: Never Used   Vaping Use    Vaping Use: Never used   Substance and Sexual Activity    Alcohol use: Not Currently    Drug use: Not Currently    Sexual activity: Not on file   Other Topics Concern     Service No    Blood Transfusions No    Caffeine Concern No    Occupational Exposure No    Hobby Hazards No    Sleep Concern No    Stress Concern No    Weight Concern No    Special Diet No    Back Care No    Exercise No    Bike Helmet No    Seat Belt No    Self-Exams No   Social History Narrative    Not on file     Social Determinants of Health     Financial Resource Strain:     Difficulty of Paying Living Expenses:    Food Insecurity:     Worried About Running Out of Food in the Last Year:     Ran Out of Food in the Last Year:    Transportation Needs:     Lack of Transportation (Medical):  Lack of Transportation (Non-Medical):    Physical Activity:     Days of Exercise per Week:     Minutes of Exercise per Session:    Stress:     Feeling of Stress :    Social Connections:     Frequency of Communication with Friends and Family:     Frequency of Social Gatherings with Friends and Family:     Attends Voodoo Services:     Active Member of Clubs or Organizations:     Attends Club or Organization Meetings:     Marital Status:    Intimate Partner Violence:     Fear of Current or Ex-Partner:     Emotionally Abused:     Physically Abused:     Sexually Abused:      History reviewed. No pertinent family history.      Current Facility-Administered Medications   Medication Dose Route Frequency    calcium carbonate (TUMS) chewable tablet 400 mg [elemental]  400 mg Oral TID PRN    guaiFENesin ER (MUCINEX) tablet 1,200 mg  1,200 mg Oral Q12H    albuterol (PROVENTIL VENTOLIN) nebulizer solution 2.5 mg  2.5 mg Nebulization QID RT    lisinopriL (PRINIVIL, ZESTRIL) tablet 10 mg  10 mg Oral DAILY    insulin glargine (LANTUS) injection 20 Units  20 Units SubCUTAneous QHS    acetaminophen (TYLENOL) tablet 650 mg  650 mg Oral Q6H PRN    Or    acetaminophen (TYLENOL) suppository 650 mg  650 mg Rectal Q6H PRN    polyethylene glycol (MIRALAX) packet 17 g  17 g Oral DAILY PRN    ondansetron (ZOFRAN ODT) tablet 4 mg  4 mg Oral Q8H PRN    Or    ondansetron (ZOFRAN) injection 4 mg  4 mg IntraVENous Q6H PRN    insulin lispro (HUMALOG) injection   SubCUTAneous AC&HS    piperacillin-tazobactam (ZOSYN) 3.375 g in 0.9% sodium chloride (MBP/ADV) 100 mL MBP  3.375 g IntraVENous Q8H    metoprolol tartrate (LOPRESSOR) tablet 25 mg  25 mg Oral Q12H    aspirin delayed-release tablet 81 mg  81 mg Oral DAILY    LORazepam (ATIVAN) injection 1 mg  1 mg IntraVENous Q6H PRN    nicotine (NICODERM CQ) 21 mg/24 hr patch 1 Patch  1 Patch TransDERmal Q24H    albuterol (PROVENTIL VENTOLIN) nebulizer solution 2.5 mg  2.5 mg Nebulization Q4H PRN    budesonide (PULMICORT) 500 mcg/2 ml nebulizer suspension  500 mcg Nebulization BID RT    sodium chloride (NS) flush 5-10 mL  5-10 mL IntraVENous Q8H    sodium chloride (NS) flush 5-10 mL  5-10 mL IntraVENous PRN       Review of symptoms:  General: no recent weight loss/gain, +weakness,+ fatigue, +fever or chills   Skin: no rashes, lumps, or other skin changes   HEENT: no headache, dizziness, lightheadedness, vision changes, hearing changes, tinnitus, vertigo, sinus pressure/pain, bleeding gums, sore throat, or hoarseness   Neck: no swollen glands, goiter, pain or stiffness   Respiratory:+ cough, +sputum, no hemoptysis, +dyspnea, +wheezing   Cardiovascular: no chest pain or discomfort, palpitations,+ dyspnea, no orthopnea, paroxysmal nocturnal dyspnea, no peripheral edema   Gastrointestinal: no trouble swallowing, +heartburn, change of appetite, nausea, change in bowel habits, pain with defecation, rectal bleeding or black/tarry stools, hemorrhoids, constipation, diarrhea, abdominal pain, jaundice, liver or gallbladder problems   Urinary: no frequency, urgency , hematuria, burning/pain with urination, recent flank pain, polyuria, nocturia, or difficulty urinating   Peripheral Vascular: no claudication, leg cramps, prior DVTs, swelling of calves, legs, or feet, color change, or swelling with redness or tenderness   Musculoskeletal: no muscle or joint pain/stiffness, joint swelling, erythema of joints, or back pain   Psychiatric: no depression, mental disorders, or excessive stress   Neurological: no history of CVA, dizziness, no sensory or motor loss, seizures, syncope, tremors, numbness, tingling, no changes in mood, attention, or speech, no changes in orientation, memory, insight, or judgment. no headache, vertigo. Hematologic: no anemia, easy bruising or bleeding   Endocrine: +diabetes, thyroid problems, heat or cold intolerance, excessive sweating, polyuria, polydipsia        Subjective:   Physical Exam    Visit Vitals  BP (!) 155/81 (BP 1 Location: Left upper arm, BP Patient Position: At rest)   Pulse 78   Temp 98.2 °F (36.8 °C)   Resp 18   Ht 5' 11\" (1.803 m)   Wt 66.5 kg (146 lb 8 oz)   SpO2 99%   BMI 20.43 kg/m²     General Appearance:  Well developed, well nourished, alert and oriented x 3, and individual in no acute distress. Appears older than stated age   Ears/Nose/Mouth/Throat:   Hearing grossly normal.         Neck: Supple. Chest:   Lungs rhonchi bilaterally. Cardiovascular:  Regular rate and rhythm, S1, S2    Abdomen:   Soft, non-tender, bowel sounds are active. Extremities: No edema bilaterally. Skin: Warm and dry.            Labs:   Recent Results (from the past 24 hour(s))   GLUCOSE, POC    Collection Time: 06/14/21 11:29 AM   Result Value Ref Range    Glucose (POC) 139 (H) 65 - 100 mg/dL    Performed by Katherine    GLUCOSE, POC    Collection Time: 06/14/21  1:59 PM   Result Value Ref Range    Glucose (POC) 112 (H) 65 - 100 mg/dL    Performed by Seble Noe    CULTURE, RESPIRATORY/SPUTUM/BRONCH Mollie Amen STAIN    Collection Time: 06/14/21  3:20 PM    Specimen: Bronchial Washing; Sputum   Result Value Ref Range    Special Requests: NO SPECIAL REQUESTS      GRAM STAIN 10 TO 45 WBC'S/OIF     GRAM STAIN NO EPITHELIAL CELLS SEEN      GRAM STAIN FEW GRAM POSITIVE COCCI      GRAM STAIN FEW YEAST      Culture result:        NO GROWTH AFTER SHORT PERIOD OF INCUBATION. FURTHER RESULTS TO FOLLOW AFTER OVERNIGHT INCUBATION.    GLUCOSE, POC    Collection Time: 06/14/21  4:17 PM   Result Value Ref Range    Glucose (POC) 112 (H) 65 - 100 mg/dL    Performed by [address] [place] [address] [facility], POC    Collection Time: 06/14/21  9:03 PM   Result Value Ref Range    Glucose (POC) 254 (H) 65 - 100 mg/dL    Performed by NiecyZOFIA    EKG, 12 LEAD, SUBSEQUENT    Collection Time: 06/15/21  1:29 AM   Result Value Ref Range    Ventricular Rate 78 BPM    Atrial Rate 78 BPM    P-R Interval 190 ms    QRS Duration 146 ms    Q-T Interval 448 ms    QTC Calculation (Bezet) 510 ms    Calculated P Axis 86 degrees    Calculated R Axis -108 degrees    Calculated T Axis 77 degrees    Diagnosis       Sinus rhythm with Premature atrial complexes  Non-specific intra-ventricular conduction block  Possible Lateral infarct (cited on or before 10-NICHOLE-2021)  Abnormal ECG  When compared with ECG of 10-NICHOLE-2021 19:54,  Premature ventricular complexes are no longer Present  Premature atrial complexes are now Present  Criteria for Inferior infarct are no longer Present  Serial changes of Lateral infarct Present  Confirmed by Evangelina Monahan MD (), ELLIS RIZVI ([id number]) on 6/15/2021 0:17:50 AM     METABOLIC PANEL, BASIC Collection Time: 06/15/21  2:33 AM   Result Value Ref Range    Sodium 137 136 - 145 mmol/L    Potassium 4.1 3.5 - 5.1 mmol/L    Chloride 99 98 - 107 mmol/L    CO2 31 21 - 32 mmol/L    Anion gap 7 7 - 16 mmol/L    Glucose 303 (H) 65 - 100 mg/dL    BUN 20 6 - 23 MG/DL    Creatinine 1.09 0.8 - 1.5 MG/DL    GFR est AA >60 >60 ml/min/1.73m2    GFR est non-AA >60 >60 ml/min/1.73m2    Calcium 8.0 (L) 8.3 - 10.4 MG/DL   MAGNESIUM    Collection Time: 06/15/21  2:33 AM   Result Value Ref Range    Magnesium 2.2 1.8 - 2.4 mg/dL   GLUCOSE, POC    Collection Time: 06/15/21  5:57 AM   Result Value Ref Range    Glucose (POC) 382 (H) 65 - 100 mg/dL    Per[facility] by [facility]    GLUCOSE, POC    Collection Time: 06/15/21  8:12 AM   Result Value Ref Range    Glucose (POC) 378 (H) 65 - 100 mg/dL    Performed by ToneyRitaT        Pt was seen and examined by Dr. Liss Barkley, he agrees with the following A&P. Assessment/Plan:          Diagnosis    NSVT with recent diagnosis of HFrEF 35-40% at [facility] in 3/2021- recommend changing Lopressor to Toprol XL, continue ACE-I, add Amiodarone load 400 mg BID and taper, control BP, currently appears euvolemic, check TSH, repeat echo, Pt needs ischemic work up which was postponed by [facility] due to non compliance and drug use    COPD exacerbation ([address]- antibiotics per pulmonary    Pneumomediastinum ([address]- per pulmonary    Laceration of trachea- s/p bronch per pulmonary    Hyponatremia- per primary team    Uncontrolled type II diabetes mellitus ([address]- insulin per primary team    Vocal cord paralysis    Essential hypertension- uncontrolled, titrate meds, monitor    Tobacco abuse- cessation encouraged    Methamphetamine use ([address]- cessation encouraged       Thank you for allowing us to participate in the care of this patient, we will continue to follow along with you.     Kimberly Saenz PA-C

## 2021-06-15 NOTE — PROGRESS NOTES
Paged MD Waters re 9 beat run of VT, now NSR in 70s with stable VS. EKG, BMP and mag and cardiology c/s ordered. 0136: EKG shows NSR with PACs and intraventricular block. MD Jt LAL paged. Waiting for labs.

## 2021-06-15 NOTE — PROGRESS NOTES
Problem: Falls - Risk of  Goal: *Absence of Falls  Description: Document Tucker Martin Fall Risk and appropriate interventions in the flowsheet.   Outcome: Progressing Towards Goal  Note: Fall Risk Interventions:  Mobility Interventions: Bed/chair exit alarm, Strengthening exercises (ROM-active/passive)         Medication Interventions: Assess postural VS orthostatic hypotension, Bed/chair exit alarm, Patient to call before getting OOB    Elimination Interventions: Call light in reach              Problem: Patient Education: Go to Patient Education Activity  Goal: Patient/Family Education  Outcome: Progressing Towards Goal

## 2021-06-16 PROBLEM — E86.0 DEHYDRATION: Status: RESOLVED | Noted: 2021-06-10 | Resolved: 2021-06-16

## 2021-06-16 LAB
ANION GAP SERPL CALC-SCNC: 7 MMOL/L (ref 7–16)
BASOPHILS # BLD: 0 K/UL (ref 0–0.2)
BASOPHILS NFR BLD: 0 % (ref 0–2)
BUN SERPL-MCNC: 19 MG/DL (ref 6–23)
CALCIUM SERPL-MCNC: 8.3 MG/DL (ref 8.3–10.4)
CHLORIDE SERPL-SCNC: 100 MMOL/L (ref 98–107)
CO2 SERPL-SCNC: 29 MMOL/L (ref 21–32)
CREAT SERPL-MCNC: 0.95 MG/DL (ref 0.8–1.5)
DIFFERENTIAL METHOD BLD: ABNORMAL
EOSINOPHIL # BLD: 0 K/UL (ref 0–0.8)
EOSINOPHIL NFR BLD: 0 % (ref 0.5–7.8)
ERYTHROCYTE [DISTWIDTH] IN BLOOD BY AUTOMATED COUNT: 13.2 % (ref 11.9–14.6)
GLUCOSE BLD STRIP.AUTO-MCNC: 225 MG/DL (ref 65–100)
GLUCOSE BLD STRIP.AUTO-MCNC: 231 MG/DL (ref 65–100)
GLUCOSE BLD STRIP.AUTO-MCNC: 240 MG/DL (ref 65–100)
GLUCOSE BLD STRIP.AUTO-MCNC: 287 MG/DL (ref 65–100)
GLUCOSE SERPL-MCNC: 222 MG/DL (ref 65–100)
HCT VFR BLD AUTO: 44.1 % (ref 41.1–50.3)
HGB BLD-MCNC: 14.6 G/DL (ref 13.6–17.2)
IMM GRANULOCYTES # BLD AUTO: 0.1 K/UL (ref 0–0.5)
IMM GRANULOCYTES NFR BLD AUTO: 1 % (ref 0–5)
LYMPHOCYTES # BLD: 2.3 K/UL (ref 0.5–4.6)
LYMPHOCYTES NFR BLD: 26 % (ref 13–44)
MAGNESIUM SERPL-MCNC: 1.9 MG/DL (ref 1.8–2.4)
MCH RBC QN AUTO: 26.8 PG (ref 26.1–32.9)
MCHC RBC AUTO-ENTMCNC: 33.1 G/DL (ref 31.4–35)
MCV RBC AUTO: 80.9 FL (ref 79.6–97.8)
MONOCYTES # BLD: 0.8 K/UL (ref 0.1–1.3)
MONOCYTES NFR BLD: 9 % (ref 4–12)
NEUTS SEG # BLD: 5.6 K/UL (ref 1.7–8.2)
NEUTS SEG NFR BLD: 63 % (ref 43–78)
NRBC # BLD: 0 K/UL (ref 0–0.2)
PLATELET # BLD AUTO: 169 K/UL (ref 150–450)
PMV BLD AUTO: 10.4 FL (ref 9.4–12.3)
POTASSIUM SERPL-SCNC: 4.2 MMOL/L (ref 3.5–5.1)
RBC # BLD AUTO: 5.45 M/UL (ref 4.23–5.6)
SERVICE CMNT-IMP: ABNORMAL
SODIUM SERPL-SCNC: 136 MMOL/L (ref 138–145)
WBC # BLD AUTO: 9 K/UL (ref 4.3–11.1)

## 2021-06-16 PROCEDURE — 74011250637 HC RX REV CODE- 250/637: Performed by: INTERNAL MEDICINE

## 2021-06-16 PROCEDURE — 74011000258 HC RX REV CODE- 258: Performed by: NURSE PRACTITIONER

## 2021-06-16 PROCEDURE — 65660000000 HC RM CCU STEPDOWN

## 2021-06-16 PROCEDURE — 74011250637 HC RX REV CODE- 250/637: Performed by: NURSE PRACTITIONER

## 2021-06-16 PROCEDURE — 74011000250 HC RX REV CODE- 250: Performed by: EMERGENCY MEDICINE

## 2021-06-16 PROCEDURE — 94760 N-INVAS EAR/PLS OXIMETRY 1: CPT

## 2021-06-16 PROCEDURE — 74011636637 HC RX REV CODE- 636/637: Performed by: HOSPITALIST

## 2021-06-16 PROCEDURE — 74011636637 HC RX REV CODE- 636/637: Performed by: INTERNAL MEDICINE

## 2021-06-16 PROCEDURE — 99232 SBSQ HOSP IP/OBS MODERATE 35: CPT | Performed by: INTERNAL MEDICINE

## 2021-06-16 PROCEDURE — 74011250637 HC RX REV CODE- 250/637: Performed by: HOSPITALIST

## 2021-06-16 PROCEDURE — 82962 GLUCOSE BLOOD TEST: CPT

## 2021-06-16 PROCEDURE — 94640 AIRWAY INHALATION TREATMENT: CPT

## 2021-06-16 PROCEDURE — 77010033678 HC OXYGEN DAILY

## 2021-06-16 PROCEDURE — 74011000250 HC RX REV CODE- 250: Performed by: NURSE PRACTITIONER

## 2021-06-16 PROCEDURE — 85025 COMPLETE CBC W/AUTO DIFF WBC: CPT

## 2021-06-16 PROCEDURE — 36415 COLL VENOUS BLD VENIPUNCTURE: CPT

## 2021-06-16 PROCEDURE — 80048 BASIC METABOLIC PNL TOTAL CA: CPT

## 2021-06-16 PROCEDURE — 83735 ASSAY OF MAGNESIUM: CPT

## 2021-06-16 PROCEDURE — 74011250637 HC RX REV CODE- 250/637: Performed by: PHYSICIAN ASSISTANT

## 2021-06-16 PROCEDURE — 74011250636 HC RX REV CODE- 250/636: Performed by: NURSE PRACTITIONER

## 2021-06-16 RX ORDER — LANOLIN ALCOHOL/MO/W.PET/CERES
400 CREAM (GRAM) TOPICAL 2 TIMES DAILY
Status: DISCONTINUED | OUTPATIENT
Start: 2021-06-16 | End: 2021-06-18 | Stop reason: HOSPADM

## 2021-06-16 RX ORDER — INSULIN GLARGINE 100 [IU]/ML
25 INJECTION, SOLUTION SUBCUTANEOUS
Status: DISCONTINUED | OUTPATIENT
Start: 2021-06-16 | End: 2021-06-18 | Stop reason: HOSPADM

## 2021-06-16 RX ADMIN — ALBUTEROL SULFATE 2.5 MG: 2.5 SOLUTION RESPIRATORY (INHALATION) at 20:17

## 2021-06-16 RX ADMIN — INSULIN LISPRO 6 UNITS: 100 INJECTION, SOLUTION INTRAVENOUS; SUBCUTANEOUS at 21:34

## 2021-06-16 RX ADMIN — INSULIN LISPRO 6 UNITS: 100 INJECTION, SOLUTION INTRAVENOUS; SUBCUTANEOUS at 11:48

## 2021-06-16 RX ADMIN — ALBUTEROL SULFATE 2.5 MG: 2.5 SOLUTION RESPIRATORY (INHALATION) at 11:18

## 2021-06-16 RX ADMIN — LISINOPRIL 10 MG: 5 TABLET ORAL at 11:48

## 2021-06-16 RX ADMIN — AMIODARONE HYDROCHLORIDE 400 MG: 200 TABLET ORAL at 16:33

## 2021-06-16 RX ADMIN — INSULIN LISPRO 6 UNITS: 100 INJECTION, SOLUTION INTRAVENOUS; SUBCUTANEOUS at 06:15

## 2021-06-16 RX ADMIN — PIPERACILLIN SODIUM AND TAZOBACTAM SODIUM 3.38 G: 3; .375 INJECTION, POWDER, LYOPHILIZED, FOR SOLUTION INTRAVENOUS at 05:54

## 2021-06-16 RX ADMIN — SODIUM CHLORIDE 10 ML: 9 INJECTION, SOLUTION INTRAMUSCULAR; INTRAVENOUS; SUBCUTANEOUS at 21:35

## 2021-06-16 RX ADMIN — PIPERACILLIN SODIUM AND TAZOBACTAM SODIUM 3.38 G: 3; .375 INJECTION, POWDER, LYOPHILIZED, FOR SOLUTION INTRAVENOUS at 21:35

## 2021-06-16 RX ADMIN — ALBUTEROL SULFATE 2.5 MG: 2.5 SOLUTION RESPIRATORY (INHALATION) at 07:29

## 2021-06-16 RX ADMIN — GUAIFENESIN 1200 MG: 600 TABLET ORAL at 21:34

## 2021-06-16 RX ADMIN — SODIUM CHLORIDE 10 ML: 9 INJECTION, SOLUTION INTRAMUSCULAR; INTRAVENOUS; SUBCUTANEOUS at 05:54

## 2021-06-16 RX ADMIN — ASPIRIN 81 MG: 81 TABLET ORAL at 11:48

## 2021-06-16 RX ADMIN — INSULIN GLARGINE 25 UNITS: 100 INJECTION, SOLUTION SUBCUTANEOUS at 21:34

## 2021-06-16 RX ADMIN — ALBUTEROL SULFATE 2.5 MG: 2.5 SOLUTION RESPIRATORY (INHALATION) at 15:25

## 2021-06-16 RX ADMIN — INSULIN LISPRO 9 UNITS: 100 INJECTION, SOLUTION INTRAVENOUS; SUBCUTANEOUS at 16:33

## 2021-06-16 RX ADMIN — BUDESONIDE 500 MCG: 0.5 INHALANT RESPIRATORY (INHALATION) at 07:29

## 2021-06-16 RX ADMIN — AMIODARONE HYDROCHLORIDE 400 MG: 200 TABLET ORAL at 11:48

## 2021-06-16 RX ADMIN — MAGNESIUM OXIDE TAB 400 MG (241.3 MG ELEMENTAL MG) 400 MG: 400 (241.3 MG) TAB at 16:33

## 2021-06-16 RX ADMIN — BUDESONIDE 500 MCG: 0.5 INHALANT RESPIRATORY (INHALATION) at 20:17

## 2021-06-16 RX ADMIN — METOPROLOL SUCCINATE 50 MG: 50 TABLET, EXTENDED RELEASE ORAL at 11:48

## 2021-06-16 RX ADMIN — SODIUM CHLORIDE 10 ML: 9 INJECTION, SOLUTION INTRAMUSCULAR; INTRAVENOUS; SUBCUTANEOUS at 14:04

## 2021-06-16 RX ADMIN — PIPERACILLIN SODIUM AND TAZOBACTAM SODIUM 3.38 G: 3; .375 INJECTION, POWDER, LYOPHILIZED, FOR SOLUTION INTRAVENOUS at 14:04

## 2021-06-16 NOTE — PROGRESS NOTES
[facility] CARDIOLOGY PROGRESS NOTE    6/16/2021 9:42 AM    Admit Date: 6/10/2021        Subjective:   Stable overnight without angina, CHF, or palpitations. Vitals stable and controlled. No other complaints overnight. Tolerating meds well. Objective:      Vitals:    06/16/21 0231 06/16/21 0729 06/16/21 0734 06/16/21 0800   BP: 125/76  112/67    Pulse: 84  67 72   Resp: 18  18    Temp: 97.8 °F (36.6 °C)  97.9 °F (36.6 °C)    SpO2: 97% 95% 94%    Weight:       Height:           Physical Exam:   Neck- supple, no JVD at 60 deg  CV- regular rate and rhythm no MRG  Lung- clear bilaterally  Abd- soft, nontender, nondistended  Ext- no edema  Skin- warm and dry    Data Review:   Recent Labs     06/16/21  0543 06/15/21  0233 06/14/21  0546   * 137 136*   K 4.2 4.1 3.9   MG 1.9 2.2 1.9   BUN 19 20 20   CREA 0.95 1.09 0.62*   * 303* 150*   WBC 9.0  --  7.9   HGB 14.6  --  13.3*   HCT 44.1  --  40.1*     --  161     Echo 6/15/21:    · LV: Estimated LVEF is 40 - 45%. Normal cavity size. Moderate concentric hypertrophy. Left ventricular diastolic dysfunction. · Echo study was technically difficulty. · Contrast used: DEFINITY. · LA: Mildly dilated left atrium. ·   Assessment and Plan:     1. Chronic systolic CHF: Poor prognosis given his noncompliance and lifestyle/substance use history. Dr. Jessie Genao discussed this with patient and his wife yesterday. He seems disengaged with his health condition. Will continue Toprol and lisinopril therapy, titrate as BP and HR allow. Echo has been ordered and will be reviewed. No plans for cardiac catheterization given his acute illness and noncompliance. 2.  Nonsustained ventricular tachycardia: Started amiodarone - continue taper as tolerated. Monitor on telemetry. Not ICD or LifeVest candidate given his noncompliance.   If he were to take his medications consistently and follow-up in our office, we would proceed with further evaluation for ICD in the future. 3.  Respiratory failure: Therapy as directed by pulmonary. Stable and lying flat comfortably this AM without orthopnea, SOB, or wheezing. 4.  Tobacco/substance abuse: Discussed need for cessation. 5. Hypokalemia- recheck BMP and Mg in AM, replete as needed, K>4 and Mg>2 if possible. 6. COPD exacerbation- improved, smoking cessation discussed, no SOB or wheezing and no clinical signs of volume overload today. Bronch with stent 6/18 - continue nebs/steroid taper, titrate meds as tolerated    LINUS Estrada MD  [facility] Cardiology  Pager [phone]

## 2021-06-16 NOTE — PROGRESS NOTES
Attempted to given am meds. Pt refuses all med at this time. Related to \"stomach pain\" pt with good po intake for breakfast. No distress noted at this time. Will monitor.

## 2021-06-16 NOTE — PROGRESS NOTES
Pt resting in bed with eyes closed. Pt awakens when spoken to. Pt on RA. Pt will  Speak very little with nurse. Pt denies pain or distress at this time. Call light in reach, will monitor.

## 2021-06-16 NOTE — PROGRESS NOTES
Pt resting in bed with eyes closed. No s/sx of distress noted at this time. Call light I reach, will monitor.

## 2021-06-16 NOTE — PROGRESS NOTES
Steffany Darby  Admission Date: 6/10/2021             Daily Progress Note: 6/16/2021    The patient's chart is reviewed and the patient is discussed with the staff.  Godfrey Dickerson y.o.  male, PMH DM, HTN, tobacco abuse, methamphetamine abuse, Hep C positive in 2018, and cervical disc disorder, seen and evaluated at the request of Suri Hendricks for pneumomediastinum.  The patient presented to the ED here via EMS with c/o productive cough w/ yellow sputum and shortness of breath X 3 days.  O2 sat on RA was noted to be 88%. CT Chest was performed to r/o PE which revealed no PE but did indicate pneumomediastinum and obstruction of the lower lobar bronchi, likely mucus impaction. WBC has went from 11.6 on admission to 16.1. The patient was started on Zosyn and is on O2 at 3L NC.         The patient was admitted to [facility] in March of this year after presenting to the ED at [facility] with c/o cough, dysphonia, and worsening dyspnea.  Laryngoscopy was performed and demonstrated nodular mass right vocal cord and thick whitish exudate overlying both vocal cords. At discharge from that admission the patient was placed on Augmentin and Nystatin for vocal cord mass vs lagryngeal thrush.  The patient had heavy Haemophilus influenza growth in sputum culture.  Echocardiogram during that admission revealed EF 35-40% with septal hypokinesis and grade 1 diastolic dysfunction.  ventricular ectopy noted 6/15 - cardiology consulted. Bronch on 6/15 - 3X2 cm posterior wall tear noted 1 cm below vocal cords but no visible communication seen. Purulent material removed with ? Foreign body. Material sent for path.  Plans for stent 6/18    Subjective:     Bronch 6/15 with large tear below vocal cords and removal of foreign body- cartiledge and bone  Plans for stent 6/18 per Dr Favian Reynoso      Current Facility-Administered Medications   Medication Dose Route Frequency    calcium carbonate (TUMS) chewable tablet 400 mg [elemental]  400 mg Oral TID PRN    guaiFENesin ER (MUCINEX) tablet 1,200 mg  1,200 mg Oral Q12H    metoprolol succinate (TOPROL-XL) XL tablet 50 mg  50 mg Oral DAILY    amiodarone (CORDARONE) tablet 400 mg  400 mg Oral BID    albuterol (PROVENTIL VENTOLIN) nebulizer solution 2.5 mg  2.5 mg Nebulization QID RT    lisinopriL (PRINIVIL, ZESTRIL) tablet 10 mg  10 mg Oral DAILY    insulin glargine (LANTUS) injection 20 Units  20 Units SubCUTAneous QHS    acetaminophen (TYLENOL) tablet 650 mg  650 mg Oral Q6H PRN    Or    acetaminophen (TYLENOL) suppository 650 mg  650 mg Rectal Q6H PRN    polyethylene glycol (MIRALAX) packet 17 g  17 g Oral DAILY PRN    ondansetron (ZOFRAN ODT) tablet 4 mg  4 mg Oral Q8H PRN    Or    ondansetron (ZOFRAN) injection 4 mg  4 mg IntraVENous Q6H PRN    insulin lispro (HUMALOG) injection   SubCUTAneous AC&HS    piperacillin-tazobactam (ZOSYN) 3.375 g in 0.9% sodium chloride (MBP/ADV) 100 mL MBP  3.375 g IntraVENous Q8H    aspirin delayed-release tablet 81 mg  81 mg Oral DAILY    LORazepam (ATIVAN) injection 1 mg  1 mg IntraVENous Q6H PRN    nicotine (NICODERM CQ) 21 mg/24 hr patch 1 Patch  1 Patch TransDERmal Q24H    albuterol (PROVENTIL VENTOLIN) nebulizer solution 2.5 mg  2.5 mg Nebulization Q4H PRN    budesonide (PULMICORT) 500 mcg/2 ml nebulizer suspension  500 mcg Nebulization BID RT    sodium chloride (NS) flush 5-10 mL  5-10 mL IntraVENous Q8H    sodium chloride (NS) flush 5-10 mL  5-10 mL IntraVENous PRN         Objective:     Vitals:    06/16/21 0800 06/16/21 1119 06/16/21 1132 06/16/21 1200   BP:   136/81    Pulse: 72  77 67   Resp:   18    Temp:   97.8 °F (36.6 °C)    SpO2:  96% 95%    Weight:       Height:         Intake and Output:   06/14 1901 - 06/16 0700  In: 1940 [P.O.:1840;  I.V.:100]  Out: 2025 [Urine:2025]  06/16 0701 - 06/16 1900  In: 360 [P.O.:360]  Out: -     Physical Exam:   Constitutional:  the patient is thin  HEENT:  Sclera clear, pupils equal, oral mucosa moist  Lungs: CTA on RA, + congested cough, no wheezes   Cardiovascular:  RRR without M,G,R.   Abd/GI: soft and non-tender; with positive bowel sounds. Ext: warm without cyanosis. There is no lower leg edema. Musculoskeletal: moves all four extremities with equal strength  Skin:  no jaundice or rashes, no wounds   Neuro: A & O X 3, flat affect      Lines: peripheral IV    CHEST XRAY: none today      LAB  Recent Labs     06/16/21  0543 06/14/21  0546   WBC 9.0 7.9   HGB 14.6 13.3*   HCT 44.1 40.1*    161     Recent Labs     06/16/21  0543 06/15/21  0233 06/14/21  0546   * 137 136*   K 4.2 4.1 3.9    99 98   CO2 29 31 33*   * 303* 150*   BUN 19 20 20   CREA 0.95 1.09 0.62*   MG 1.9 2.2 1.9   ALB  --   --  2.0*     Sputum culture  GRAM STAIN FEW YEAST    Preliminary   Culture result: Abnormal     Preliminary   MODERATE YEAST SUBCULTURE IN PROGRESS    Culture result:    Preliminary   MODERATE NORMAL RESPIRATORY DEUCE    Culture result:    Preliminary   CULTURE IN PROGRESS,FURTHER UPDATES TO FOLLOW          Assessment:  (Medical Decision Making)     Hospital Problems  Date Reviewed: 6/11/2021        Codes Class Noted POA    V tach ([facility] (Chronic) ICD-10-CM: I47.2  ICD-9-CM: 427.1  6/15/2021 Yes        * (Principal) Laceration of trachea ICD-10-CM: S11.021A  ICD-9-CM: 874.02  6/14/2021 Unknown        COPD exacerbation ([facility] ICD-10-CM: J44.1  ICD-9-CM: 491.21  6/11/2021 Yes        Pneumomediastinum ([facility] ICD-10-CM: J98.2  ICD-9-CM: 518.1  6/10/2021 Yes        Substance abuse (HCC) (Chronic) ICD-10-CM: F19.10  ICD-9-CM: 305.90  6/10/2021 Yes        Acute hyponatremia ICD-10-CM: E87.1  ICD-9-CM: 276.1  6/10/2021 Yes        Uncontrolled type II diabetes mellitus (HCC) ICD-10-CM: E11.65  ICD-9-CM: 250.02  6/10/2021 Yes        Dehydration ICD-10-CM: E86.0  ICD-9-CM: 276.51  6/10/2021 Yes            Smoker and drug abuse hx here with lacerated trachea and foreign body. Underlying DM, NSVT, HTN and chronic heart failure. S/P bronch/ inspection- cultures and pathology sent ad foreign body removed. Abx started and plans for stent placement on Friday. Followed by cardiology, medications adjusted. There are no plans for intervention at this point. Plan:  (Medical Decision Making)     --Plans for stent 6/18- large 3X2 cm tear noted below vocal cords  --Day 7 Zosyn - blood cultures negative. Bronch washings sent for culture with yeast so far- purulent secretions noted along tear and in lower airways  WBC down to normal.   -- Smoking cessation and lifestyle modification recommended. Will need office follow up with PFTs if agreeable, on Nicoderm here. -- Cardiology following, amiodarone started. echo ordered. HTN now controlled.  No plans for cardiac catheterization, ICD or life vest given his acute illness and noncompliance. Marcos Medrano, CEDRICK    Lungs: no wheezing  Heart:  RRR with no Murmur/Rubs/Gallops    Additional Comments:   Cont. Zosyn and follow cultures. Plan for tracheal stent on Friday    I have spoken with and examined the patient. I agree with the above assessment and plan as documented.     Rashard Holland MD

## 2021-06-16 NOTE — PROGRESS NOTES
MSN, CM:  Patient scheduled to have tracheal stent placed Friday. Cardiology follow with amiodarone started and ECHO ordered. Discharged needs unclear at this time. Case Management will continue to follow.

## 2021-06-16 NOTE — PROGRESS NOTES
[facility]ist Progress Note     Name:  Keira Simon  Age:55 y.o. Sex:male   :  1965    MRN:  [id number]     Admit Date:  6/10/2021    Reason for Admission:  Pneumomediastinum [facility]) [J98.2]    Assessment & Plan       Acute respiratory failure with COPD exacerbation, pneumonia, tracheal defect with pneumomediastinum:  · Wean O2 as tolerant, on room air   · D7 zosyn  · IV steroid taper completed   · Scheduled nebs, pulmicort  ·  bronch with stent for 6-18  · appreciate pulmonary  · Needs smoking cessation       Dm2:  · SSI  · Increase  lantus         Hyponatremia:  · Corrected for hyperglycemia   · Trend BMP-improved       VTACH, HFrEF:  · followup remote tele   · Follow potassium >4.0/ magnesium >2.0- supplement   · Continued metoprolol /  Lisinopril  · Amiodarone added  · Cardiology following       Elevated LFTS, HEPC:  · Trend LFTS      Hypokalemia/hypomagnesemia:  · Replace and repeat lab         Diet:  DIET ADULT  DVT PPx: SCD  GI Ppx:  none  Code: Full Code    Dispo/Discharge Planning: pending     Hospital Course/Subjective:       Mr. Tim Shanks is a 62 yo male PMH of COPD, DM2, HTN, tobacco use, methamphetamine use, HEPC, HFrEF, VTACH admitted with COPD exacerbation. CTA chest shows defect or rupture of posterior tracheal wall near thoracic inlet with air into the upper mediastinum and soft tissues, obstruction of bilateral lower bronchi. He has been seen by pulmonary and placed on nebs, pulmicort, IV steroids and zosyn. BC NGTD. He is s/p bronch 21 showing purulent mucus at vocal cords and large posterior wall tear 1 cm below vocal cords. He is pending tracheal stent placement for 21. Cardiology following for recurrent VTACH. Amiodarone added. ECHO EF 40-45%.       Discharge plans pending         Subjective/24 hr Events (21):       friend present , sleeping through the stay     Objective:     Patient Vitals for the past 24 hrs:   Temp Pulse Resp BP SpO2   21 1539 97.9 °F (36.6 °C) 78 18 118/75 97 %   06/16/21 1525     99 %   06/16/21 1200  67      06/16/21 1132 97.8 °F (36.6 °C) 77 18 136/81 95 %   06/16/21 1119     96 %   06/16/21 0800  72      06/16/21 0734 97.9 °F (36.6 °C) 67 18 112/67 94 %   06/16/21 0729     95 %   06/16/21 0231 97.8 °F (36.6 °C) 84 18 125/76 97 %   06/15/21 2246 98.1 °F (36.7 °C) 80 18 135/70 94 %   06/15/21 1951 98 °F (36.7 °C) 73 18 (!) 154/75 99 %   06/15/21 1946     94 %   06/15/21 1745 97.8 °F (36.6 °C) 78 18 (!) 155/73 97 %     Oxygen Therapy  O2 Sat (%): 97 % (06/16/21 1539)  Pulse via Oximetry: 75 beats per minute (06/16/21 1525)  O2 Device: None (Room air) (06/16/21 1525)  Skin Assessment: Clean, dry, & intact (06/15/21 1946)  O2 Flow Rate (L/min): 4 l/min (06/14/21 1537)  FIO2 (%): 21 % (06/16/21 1525)    Body mass index is 20.36 kg/m². Physical Exam:   General:     Alert , no distress    Lungs:   Clear   Cardiac:   RRR, Normal S1 and S2. No S3, S4 or murmurs.   No edema   Neuro:   Nonfocal, sleeping         Data Review:  I have reviewed all labs, meds, and studies from the last 24 hours:    Labs:  Recent Results (from the past 24 hour(s))   GLUCOSE, POC    Collection Time: 06/15/21  8:48 PM   Result Value Ref Range    Glucose (POC) 316 (H) 65 - 100 mg/dL    Performed by [facility]    METABOLIC PANEL, BASIC    Collection Time: 06/16/21  5:43 AM   Result Value Ref Range    Sodium 136 (L) 138 - 145 mmol/L    Potassium 4.2 3.5 - 5.1 mmol/L    Chloride 100 98 - 107 mmol/L    CO2 29 21 - 32 mmol/L    Anion gap 7 7 - 16 mmol/L    Glucose 222 (H) 65 - 100 mg/dL    BUN 19 6 - 23 MG/DL    Creatinine 0.95 0.8 - 1.5 MG/DL    GFR est AA >60 >60 ml/min/1.73m2    GFR est non-AA >60 >60 ml/min/1.73m2    Calcium 8.3 8.3 - 10.4 MG/DL   MAGNESIUM    Collection Time: 06/16/21  5:43 AM   Result Value Ref Range    Magnesium 1.9 1.8 - 2.4 mg/dL   CBC WITH AUTOMATED DIFF    Collection Time: 06/16/21  5:43 AM   Result Value Ref Range WBC 9.0 4.3 - 11.1 K/uL    RBC 5.45 4.23 - 5.6 M/uL    HGB 14.6 13.6 - 17.2 g/dL    HCT 44.1 41.1 - 50.3 %    MCV 80.9 79.6 - 97.8 FL    MCH 26.8 26.1 - 32.9 PG    MCHC 33.1 31.4 - 35.0 g/dL    RDW 13.2 11.9 - 14.6 %    PLATELET 064 450 - 946 K/uL    MPV 10.4 9.4 - 12.3 FL    ABSOLUTE NRBC 0.00 0.0 - 0.2 K/uL    DF AUTOMATED      NEUTROPHILS 63 43 - 78 %    LYMPHOCYTES 26 13 - 44 %    MONOCYTES 9 4.0 - 12.0 %    EOSINOPHILS 0 (L) 0.5 - 7.8 %    BASOPHILS 0 0.0 - 2.0 %    IMMATURE GRANULOCYTES 1 0.0 - 5.0 %    ABS. NEUTROPHILS 5.6 1.7 - 8.2 K/UL    ABS. LYMPHOCYTES 2.3 0.5 - 4.6 K/UL    ABS. MONOCYTES 0.8 0.1 - 1.3 K/UL    ABS. EOSINOPHILS 0.0 0.0 - 0.8 K/UL    ABS. BASOPHILS 0.0 0.0 - 0.2 K/UL    ABS. IMM.  GRANS. 0.1 0.0 - 0.5 K/UL   GLUCOSE, POC    Collection Time: 06/16/21  6:00 AM   Result Value Ref Range    Glucose (POC) 240 (H) 65 - 100 mg/dL    Performed by [facility] [employer]    GLUCOSE, POC    Collection Time: 06/16/21 11:13 AM   Result Value Ref Range    Glucose (POC) 231 (H) 65 - 100 mg/dL    Performed by [employer]    GLUCOSE, POC    Collection Time: 06/16/21  4:13 PM   Result Value Ref Range    Glucose (POC) 287 (H) 65 - 100 mg/dL    Performed by [employer]        All Micro Results     Procedure Component Value Units Date/Time    CULTURE, RESPIRATORY/SPUTUM/BRONCH Ella Fish STAIN [[id number]]  (Abnormal) Collected: 06/14/21 1520    Order Status: Completed Specimen: Sputum from Bronchial Washing Updated: 06/16/21 1105     Special Requests: NO SPECIAL REQUESTS        GRAM STAIN 10 TO 45 WBC'S/OIF      NO EPITHELIAL CELLS SEEN         FEW GRAM POSITIVE COCCI         FEW YEAST        Culture result:       MODERATE YEAST SUBCULTURE IN PROGRESS                  MODERATE NORMAL RESPIRATORY DEUCE                  CULTURE IN PROGRESS,FURTHER UPDATES TO FOLLOW          CULTURE, BLOOD [[id number]] Collected: 06/10/21 1825    Order Status: Completed Specimen: Blood Updated: 06/15/21 0719     Special Requests: RAC Culture result: NO GROWTH 5 DAYS       CULTURE, BLOOD [[id number]] Collected: 06/10/21 1825    Order Status: Completed Specimen: Blood Updated: 06/15/21 0719     Special Requests: LAC     Culture result: NO GROWTH 5 DAYS       CULTURE, RESPIRATORY/SPUTUM/BRONCH Jhony Necessary STAIN [[id number]] Collected: 06/11/21 0930    Order Status: Canceled Specimen: Sputum           EKG Results     Procedure 720 Value Units Date/Time    EKG, 12 LEAD, SUBSEQUENT [[id number]] Collected: 06/15/21 0129    Order Status: Completed Updated: 06/15/21 0725     Ventricular Rate 78 BPM      Atrial Rate 78 BPM      P-R Interval 190 ms      QRS Duration 146 ms      Q-T Interval 448 ms      QTC Calculation (Bezet) 510 ms      Calculated P Axis 86 degrees      Calculated R Axis -108 degrees      Calculated T Axis 77 degrees      Diagnosis --     Sinus rhythm with Premature atrial complexes  Non-specific intra-ventricular conduction block  Possible Lateral infarct (cited on or before 10-NICHOLE-2021)  Abnormal ECG  When compared with ECG of 10-NICHOLE-2021 19:54,  Premature ventricular complexes are no longer Present  Premature atrial complexes are now Present  Criteria for Inferior infarct are no longer Present  Serial changes of Lateral infarct Present  Confirmed by Lesly Schwartz MD (), ELLIS RIZVI ([id number]) on 6/15/2021 7:25:48 AM      EKG, 12 LEAD, INITIAL [[id number]] Collected: 06/10/21 1954    Order Status: Completed Updated: 06/10/21 2052     Ventricular Rate 96 BPM      Atrial Rate 96 BPM      P-R Interval 196 ms      QRS Duration 150 ms      Q-T Interval 410 ms      QTC Calculation (Bezet) 517 ms      Calculated P Axis 86 degrees      Calculated R Axis -54 degrees      Calculated T Axis 105 degrees      Diagnosis --       Sinus rhythm with sinus arrhythmia with occasional Premature ventricular   complexes  Biatrial enlargement  Non-specific intra-ventricular conduction block  Abnormal ECG  When compared with ECG of 10-NICHOLE-2021 17:32,  Premature supraventricular complexes are no longer Present  Serial changes of Lateral infarct Present  Confirmed by Teodora Lutz ([id number]) on 6/10/2021 8:52:09 PM      EKG [[id number]] Collected: 06/10/21 1732    Order Status: Completed Updated: 06/10/21 1807     Ventricular Rate 89 BPM      Atrial Rate 89 BPM      P-R Interval 198 ms      QRS Duration 152 ms      Q-T Interval 450 ms      QTC Calculation (Bezet) 547 ms      Calculated P Axis 82 degrees      Calculated R Axis -64 degrees      Calculated T Axis 101 degrees      Diagnosis --     !! AGE AND GENDER SPECIFIC ECG ANALYSIS !! Sinus rhythm with Premature supraventricular complexes with occasional   Premature ventricular complexes  Biatrial enlargement  Non-specific intra-ventricular conduction block  anterior infarct, age indeterminant  Abnormal ECG  No previous ECGs available  Confirmed by Heydi Daniels MD (), ELLIS RIZVI ([id number]) on 6/10/2021 6:07:12 PM            Other Studies:  No results found.     Current Meds:   Current Facility-Administered Medications   Medication Dose Route Frequency    magnesium oxide (MAG-OX) tablet 400 mg  400 mg Oral BID    calcium carbonate (TUMS) chewable tablet 400 mg [elemental]  400 mg Oral TID PRN    guaiFENesin ER (MUCINEX) tablet 1,200 mg  1,200 mg Oral Q12H    metoprolol succinate (TOPROL-XL) XL tablet 50 mg  50 mg Oral DAILY    amiodarone (CORDARONE) tablet 400 mg  400 mg Oral BID    albuterol (PROVENTIL VENTOLIN) nebulizer solution 2.5 mg  2.5 mg Nebulization QID RT    lisinopriL (PRINIVIL, ZESTRIL) tablet 10 mg  10 mg Oral DAILY    insulin glargine (LANTUS) injection 20 Units  20 Units SubCUTAneous QHS    acetaminophen (TYLENOL) tablet 650 mg  650 mg Oral Q6H PRN    Or    acetaminophen (TYLENOL) suppository 650 mg  650 mg Rectal Q6H PRN    polyethylene glycol (MIRALAX) packet 17 g  17 g Oral DAILY PRN    ondansetron (ZOFRAN ODT) tablet 4 mg  4 mg Oral Q8H PRN    Or    ondansetron (ZOFRAN) injection 4 mg  4 mg IntraVENous Q6H PRN    insulin lispro (HUMALOG) injection   SubCUTAneous AC&HS    piperacillin-tazobactam (ZOSYN) 3.375 g in 0.9% sodium chloride (MBP/ADV) 100 mL MBP  3.375 g IntraVENous Q8H    aspirin delayed-release tablet 81 mg  81 mg Oral DAILY    LORazepam (ATIVAN) injection 1 mg  1 mg IntraVENous Q6H PRN    nicotine (NICODERM CQ) 21 mg/24 hr patch 1 Patch  1 Patch TransDERmal Q24H    albuterol (PROVENTIL VENTOLIN) nebulizer solution 2.5 mg  2.5 mg Nebulization Q4H PRN    budesonide (PULMICORT) 500 mcg/2 ml nebulizer suspension  500 mcg Nebulization BID RT    sodium chloride (NS) flush 5-10 mL  5-10 mL IntraVENous Q8H    sodium chloride (NS) flush 5-10 mL  5-10 mL IntraVENous PRN       Problem List:  Hospital Problems as of 6/16/2021 Date Reviewed: 6/11/2021        Codes Class Noted - Resolved POA    V tach ([facility] (Chronic) ICD-10-CM: I47.2  ICD-9-CM: 427.1  6/15/2021 - Present Yes        * (Principal) Laceration of trachea ICD-10-CM: S11.021A  ICD-9-CM: 874.02  6/14/2021 - Present Yes        COPD exacerbation ([facility] ICD-10-CM: J44.1  ICD-9-CM: 491.21  6/11/2021 - Present Yes        Pneumomediastinum ([facility] ICD-10-CM: J98.2  ICD-9-CM: 518.1  6/10/2021 - Present Yes        Substance abuse ([facility] (Chronic) ICD-10-CM: F19.10  ICD-9-CM: 305.90  6/10/2021 - Present Yes        Acute hyponatremia ICD-10-CM: E87.1  ICD-9-CM: 276.1  6/10/2021 - Present Yes        Uncontrolled type II diabetes mellitus (HCC) (Chronic) ICD-10-CM: E11.65  ICD-9-CM: 250.02  6/10/2021 - Present Yes        RESOLVED: Dehydration ICD-10-CM: E86.0  ICD-9-CM: 276.51  6/10/2021 - 6/16/2021 Yes                   Part of this note was written by using a voice dictation software and the note has been proof read but may still contain some grammatical/other typographical errors.     Signed By: Tacho Cano MD   [facility] Hospitalist Service    June 16, 2021  3:16 PM

## 2021-06-17 ENCOUNTER — ANESTHESIA EVENT (OUTPATIENT)
Dept: ENDOSCOPY | Age: 56
DRG: 143 | End: 2021-06-17
Payer: COMMERCIAL

## 2021-06-17 LAB
ANION GAP SERPL CALC-SCNC: 6 MMOL/L (ref 7–16)
BASOPHILS # BLD: 0 K/UL (ref 0–0.2)
BASOPHILS NFR BLD: 0 % (ref 0–2)
BUN SERPL-MCNC: 20 MG/DL (ref 6–23)
CALCIUM SERPL-MCNC: 8.3 MG/DL (ref 8.3–10.4)
CHLORIDE SERPL-SCNC: 99 MMOL/L (ref 98–107)
CO2 SERPL-SCNC: 28 MMOL/L (ref 21–32)
CREAT SERPL-MCNC: 0.73 MG/DL (ref 0.8–1.5)
DIFFERENTIAL METHOD BLD: NORMAL
EOSINOPHIL # BLD: 0.1 K/UL (ref 0–0.8)
EOSINOPHIL NFR BLD: 1 % (ref 0.5–7.8)
ERYTHROCYTE [DISTWIDTH] IN BLOOD BY AUTOMATED COUNT: 13.5 % (ref 11.9–14.6)
GLUCOSE BLD STRIP.AUTO-MCNC: 287 MG/DL (ref 65–100)
GLUCOSE BLD STRIP.AUTO-MCNC: 289 MG/DL (ref 65–100)
GLUCOSE BLD STRIP.AUTO-MCNC: 320 MG/DL (ref 65–100)
GLUCOSE BLD STRIP.AUTO-MCNC: 346 MG/DL (ref 65–100)
GLUCOSE SERPL-MCNC: 292 MG/DL (ref 65–100)
HCT VFR BLD AUTO: 42.5 % (ref 41.1–50.3)
HGB BLD-MCNC: 14.3 G/DL (ref 13.6–17.2)
IMM GRANULOCYTES # BLD AUTO: 0.1 K/UL (ref 0–0.5)
IMM GRANULOCYTES NFR BLD AUTO: 2 % (ref 0–5)
LYMPHOCYTES # BLD: 1.6 K/UL (ref 0.5–4.6)
LYMPHOCYTES NFR BLD: 23 % (ref 13–44)
MAGNESIUM SERPL-MCNC: 1.7 MG/DL (ref 1.8–2.4)
MCH RBC QN AUTO: 27 PG (ref 26.1–32.9)
MCHC RBC AUTO-ENTMCNC: 33.6 G/DL (ref 31.4–35)
MCV RBC AUTO: 80.2 FL (ref 79.6–97.8)
MONOCYTES # BLD: 0.7 K/UL (ref 0.1–1.3)
MONOCYTES NFR BLD: 10 % (ref 4–12)
NEUTS SEG # BLD: 4.7 K/UL (ref 1.7–8.2)
NEUTS SEG NFR BLD: 65 % (ref 43–78)
NRBC # BLD: 0 K/UL (ref 0–0.2)
PLATELET # BLD AUTO: 155 K/UL (ref 150–450)
PMV BLD AUTO: 10.3 FL (ref 9.4–12.3)
POTASSIUM SERPL-SCNC: 4.1 MMOL/L (ref 3.5–5.1)
RBC # BLD AUTO: 5.3 M/UL (ref 4.23–5.6)
SERVICE CMNT-IMP: ABNORMAL
SODIUM SERPL-SCNC: 133 MMOL/L (ref 138–145)
WBC # BLD AUTO: 7.1 K/UL (ref 4.3–11.1)

## 2021-06-17 PROCEDURE — 74011250637 HC RX REV CODE- 250/637: Performed by: INTERNAL MEDICINE

## 2021-06-17 PROCEDURE — 74011000250 HC RX REV CODE- 250: Performed by: NURSE PRACTITIONER

## 2021-06-17 PROCEDURE — 94640 AIRWAY INHALATION TREATMENT: CPT

## 2021-06-17 PROCEDURE — 99232 SBSQ HOSP IP/OBS MODERATE 35: CPT | Performed by: INTERNAL MEDICINE

## 2021-06-17 PROCEDURE — 74011000258 HC RX REV CODE- 258: Performed by: NURSE PRACTITIONER

## 2021-06-17 PROCEDURE — 74011250637 HC RX REV CODE- 250/637: Performed by: PHYSICIAN ASSISTANT

## 2021-06-17 PROCEDURE — 65660000000 HC RM CCU STEPDOWN

## 2021-06-17 PROCEDURE — 94760 N-INVAS EAR/PLS OXIMETRY 1: CPT

## 2021-06-17 PROCEDURE — 74011250637 HC RX REV CODE- 250/637: Performed by: NURSE PRACTITIONER

## 2021-06-17 PROCEDURE — 74011636637 HC RX REV CODE- 636/637: Performed by: HOSPITALIST

## 2021-06-17 PROCEDURE — 85025 COMPLETE CBC W/AUTO DIFF WBC: CPT

## 2021-06-17 PROCEDURE — 74011250636 HC RX REV CODE- 250/636: Performed by: NURSE PRACTITIONER

## 2021-06-17 PROCEDURE — 74011250637 HC RX REV CODE- 250/637: Performed by: HOSPITALIST

## 2021-06-17 PROCEDURE — 82962 GLUCOSE BLOOD TEST: CPT

## 2021-06-17 PROCEDURE — 74011000250 HC RX REV CODE- 250: Performed by: EMERGENCY MEDICINE

## 2021-06-17 PROCEDURE — 36415 COLL VENOUS BLD VENIPUNCTURE: CPT

## 2021-06-17 PROCEDURE — 80048 BASIC METABOLIC PNL TOTAL CA: CPT

## 2021-06-17 PROCEDURE — 83735 ASSAY OF MAGNESIUM: CPT

## 2021-06-17 RX ORDER — SODIUM CHLORIDE, SODIUM LACTATE, POTASSIUM CHLORIDE, CALCIUM CHLORIDE 600; 310; 30; 20 MG/100ML; MG/100ML; MG/100ML; MG/100ML
100 INJECTION, SOLUTION INTRAVENOUS CONTINUOUS
Status: CANCELLED | OUTPATIENT
Start: 2021-06-17

## 2021-06-17 RX ORDER — SODIUM CHLORIDE, SODIUM LACTATE, POTASSIUM CHLORIDE, CALCIUM CHLORIDE 600; 310; 30; 20 MG/100ML; MG/100ML; MG/100ML; MG/100ML
25 INJECTION, SOLUTION INTRAVENOUS CONTINUOUS
Status: CANCELLED | OUTPATIENT
Start: 2021-06-17 | End: 2021-06-18

## 2021-06-17 RX ORDER — MAGNESIUM SULFATE HEPTAHYDRATE 40 MG/ML
2 INJECTION, SOLUTION INTRAVENOUS ONCE
Status: ACTIVE | OUTPATIENT
Start: 2021-06-17 | End: 2021-06-17

## 2021-06-17 RX ADMIN — INSULIN LISPRO 12 UNITS: 100 INJECTION, SOLUTION INTRAVENOUS; SUBCUTANEOUS at 21:30

## 2021-06-17 RX ADMIN — SODIUM CHLORIDE 5 ML: 9 INJECTION, SOLUTION INTRAMUSCULAR; INTRAVENOUS; SUBCUTANEOUS at 21:35

## 2021-06-17 RX ADMIN — ALBUTEROL SULFATE 2.5 MG: 2.5 SOLUTION RESPIRATORY (INHALATION) at 15:06

## 2021-06-17 RX ADMIN — INSULIN LISPRO 6 UNITS: 100 INJECTION, SOLUTION INTRAVENOUS; SUBCUTANEOUS at 16:30

## 2021-06-17 RX ADMIN — AMIODARONE HYDROCHLORIDE 400 MG: 200 TABLET ORAL at 17:05

## 2021-06-17 RX ADMIN — PIPERACILLIN SODIUM AND TAZOBACTAM SODIUM 3.38 G: 3; .375 INJECTION, POWDER, LYOPHILIZED, FOR SOLUTION INTRAVENOUS at 14:40

## 2021-06-17 RX ADMIN — MAGNESIUM OXIDE TAB 400 MG (241.3 MG ELEMENTAL MG) 400 MG: 400 (241.3 MG) TAB at 17:05

## 2021-06-17 RX ADMIN — ASPIRIN 81 MG: 81 TABLET ORAL at 09:59

## 2021-06-17 RX ADMIN — METOPROLOL SUCCINATE 50 MG: 50 TABLET, EXTENDED RELEASE ORAL at 10:00

## 2021-06-17 RX ADMIN — AMIODARONE HYDROCHLORIDE 400 MG: 200 TABLET ORAL at 10:00

## 2021-06-17 RX ADMIN — MAGNESIUM OXIDE TAB 400 MG (241.3 MG ELEMENTAL MG) 400 MG: 400 (241.3 MG) TAB at 10:00

## 2021-06-17 RX ADMIN — BUDESONIDE 500 MCG: 0.5 INHALANT RESPIRATORY (INHALATION) at 20:35

## 2021-06-17 RX ADMIN — SODIUM CHLORIDE 10 ML: 9 INJECTION, SOLUTION INTRAMUSCULAR; INTRAVENOUS; SUBCUTANEOUS at 14:40

## 2021-06-17 RX ADMIN — ALBUTEROL SULFATE 2.5 MG: 2.5 SOLUTION RESPIRATORY (INHALATION) at 07:16

## 2021-06-17 RX ADMIN — BUDESONIDE 500 MCG: 0.5 INHALANT RESPIRATORY (INHALATION) at 07:16

## 2021-06-17 RX ADMIN — ALBUTEROL SULFATE 2.5 MG: 2.5 SOLUTION RESPIRATORY (INHALATION) at 11:10

## 2021-06-17 RX ADMIN — ALBUTEROL SULFATE 2.5 MG: 2.5 SOLUTION RESPIRATORY (INHALATION) at 20:35

## 2021-06-17 RX ADMIN — PIPERACILLIN SODIUM AND TAZOBACTAM SODIUM 3.38 G: 3; .375 INJECTION, POWDER, LYOPHILIZED, FOR SOLUTION INTRAVENOUS at 06:06

## 2021-06-17 RX ADMIN — INSULIN LISPRO 12 UNITS: 100 INJECTION, SOLUTION INTRAVENOUS; SUBCUTANEOUS at 12:05

## 2021-06-17 RX ADMIN — INSULIN LISPRO 9 UNITS: 100 INJECTION, SOLUTION INTRAVENOUS; SUBCUTANEOUS at 06:06

## 2021-06-17 RX ADMIN — SODIUM CHLORIDE 10 ML: 9 INJECTION, SOLUTION INTRAMUSCULAR; INTRAVENOUS; SUBCUTANEOUS at 06:06

## 2021-06-17 RX ADMIN — PIPERACILLIN SODIUM AND TAZOBACTAM SODIUM 3.38 G: 3; .375 INJECTION, POWDER, LYOPHILIZED, FOR SOLUTION INTRAVENOUS at 22:00

## 2021-06-17 RX ADMIN — LISINOPRIL 10 MG: 5 TABLET ORAL at 10:00

## 2021-06-17 NOTE — PROGRESS NOTES
Patient refusing IV magnesium, MD made aware. Patient wanting to go outside alone qwith wife, MD instructed patient not to do so.  Patient not allowed to go Outside without staff member, RN offered to go outside with patient, patient very angry and agitated stating \"I dont need a  and I will go outside if I want to\"     Dr Meagan Castillo at bedside now with patient

## 2021-06-17 NOTE — PROGRESS NOTES
Ravin Darby  Admission Date: 6/10/2021             Daily Progress Note: 6/17/2021      The patient's chart is reviewed and the patient is discussed with the staff. Gely [email or web address].  male, PMH DM, HTN, tobacco abuse, methamphetamine abuse, Hep C positive in 2018, and cervical disc disorder, seen and evaluated at the request of Lee Vaca for pneumomediastinum.  The patient presented to the ED here via EMS with c/o productive cough w/ yellow sputum and shortness of breath X 3 days.  O2 sat on RA was noted to be 88%. CT Chest was performed to r/o PE which revealed no PE but did indicate pneumomediastinum and obstruction of the lower lobar bronchi, likely mucus impaction. WBC has went from 11.6 on admission to 16.1. The patient was started on Zosyn and is on O2 at 3L NC.  Bronch on 6/15 - 3X2 cm posterior wall tear noted 1 cm below vocal cords but no visible communication seen. Purulent material removed with ? Foreign body. Material sent for path - noted cartilage and bone. Culture reported yeast.  Plans for stent 6/18         The patient was admitted to [address] [place] [address] in March of this year after presenting to the ED at [facility] with c/o cough, dysphonia, and worsening dyspnea.  Laryngoscopy was performed and demonstrated nodular mass right vocal cord and thick whitish exudate overlying both vocal cords. At discharge from that admission the patient was placed on Augmentin and Nystatin for vocal cord mass vs lagryngeal thrush.  The patient had heavy Haemophilus influenza growth in sputum culture.  Echocardiogram during that admission revealed EF 35-40% with septal hypokinesis and grade 1 diastolic dysfunction.        Ventricular ectopy noted 6/15 - cardiology consulted. Echo here with EF of 40 to 45%, started on Amiodarone, ACE and beta blocker. No plans for ICD with history of noncompliance. To follow up with cardiology as outpatient.         Subjective:       Sleeping but easily arouses. Discussed plan for tomorrow, results of path. He denies any congestion or shortness of breath.       Current Facility-Administered Medications   Medication Dose Route Frequency    magnesium sulfate 2 g/50 ml IVPB (premix or compounded)  2 g IntraVENous ONCE    magnesium oxide (MAG-OX) tablet 400 mg  400 mg Oral BID    insulin glargine (LANTUS) injection 25 Units  25 Units SubCUTAneous QHS    calcium carbonate (TUMS) chewable tablet 400 mg [elemental]  400 mg Oral TID PRN    guaiFENesin ER (MUCINEX) tablet 1,200 mg  1,200 mg Oral Q12H    metoprolol succinate (TOPROL-XL) XL tablet 50 mg  50 mg Oral DAILY    amiodarone (CORDARONE) tablet 400 mg  400 mg Oral BID    albuterol (PROVENTIL VENTOLIN) nebulizer solution 2.5 mg  2.5 mg Nebulization QID RT    lisinopriL (PRINIVIL, ZESTRIL) tablet 10 mg  10 mg Oral DAILY    acetaminophen (TYLENOL) tablet 650 mg  650 mg Oral Q6H PRN    Or    acetaminophen (TYLENOL) suppository 650 mg  650 mg Rectal Q6H PRN    polyethylene glycol (MIRALAX) packet 17 g  17 g Oral DAILY PRN    ondansetron (ZOFRAN ODT) tablet 4 mg  4 mg Oral Q8H PRN    Or    ondansetron (ZOFRAN) injection 4 mg  4 mg IntraVENous Q6H PRN    insulin lispro (HUMALOG) injection   SubCUTAneous AC&HS    piperacillin-tazobactam (ZOSYN) 3.375 g in 0.9% sodium chloride (MBP/ADV) 100 mL MBP  3.375 g IntraVENous Q8H    aspirin delayed-release tablet 81 mg  81 mg Oral DAILY    LORazepam (ATIVAN) injection 1 mg  1 mg IntraVENous Q6H PRN    nicotine (NICODERM CQ) 21 mg/24 hr patch 1 Patch  1 Patch TransDERmal Q24H    albuterol (PROVENTIL VENTOLIN) nebulizer solution 2.5 mg  2.5 mg Nebulization Q4H PRN    budesonide (PULMICORT) 500 mcg/2 ml nebulizer suspension  500 mcg Nebulization BID RT    sodium chloride (NS) flush 5-10 mL  5-10 mL IntraVENous Q8H    sodium chloride (NS) flush 5-10 mL  5-10 mL IntraVENous PRN         Objective:     Vitals:    06/17/21 0246 06/17/21 0248 06/17/21 0717 06/17/21 0725 BP:  132/74  133/70   Pulse:  74  66   Resp:  18  18   Temp:  97.8 °F (36.6 °C)  98.4 °F (36.9 °C)   SpO2:  98% 93% 93%   Weight: 167 lb 8 oz (76 kg)      Height:         Intake and Output:   06/15 1901 - 06/17 0700  In: 1200 [P.O.:1200]  Out: 1000 [Urine:1000]  06/17 0701 - 06/17 1900  In: -   Out: 200 [Urine:200]    Physical Exam:   Constitutional:  the patient is well developed and in no acute distress  HEENT:  Sclera clear, pupils equal, oral mucosa moist  Lungs: clear bilaterally. On room air  Cardiovascular:  RRR without M,G,R  Abd/GI: soft and non-tender; with positive bowel sounds. Ext: warm without cyanosis. There is no lower leg edema. Musculoskeletal: moves all four extremities with equal strength  Skin:  no jaundice or rashes, no wounds   Neuro: no gross neuro deficits   Musculoskeletal: can ambulate. No deformity  Psychiatric: Calm. Review of Systems - Respiratory ROS: no cough, shortness of breath, or wheezing  Cardiovascular ROS: no chest pain or dyspnea on exertion  Gastrointestinal ROS: no abdominal pain, change in bowel habits, or black or bloody stools   Lines: peripheral IV    CHEST XRAY:  None today    Echo:       LAB  Recent Labs     06/17/21  0532 06/16/21  0543   WBC 7.1 9.0   HGB 14.3 14.6   HCT 42.5 44.1    169     Recent Labs     06/17/21  0532 06/16/21  0543 06/15/21  0233   * 136* 137   K 4.1 4.2 4.1   CL 99 100 99   CO2 28 29 31   * 222* 303*   BUN 20 19 20   CREA 0.73* 0.95 1.09   MG 1.7* 1.9 2.2       Assessment:  (Medical Decision Making)     Hospital Problems  Date Reviewed: 6/11/2021        Codes Class Noted POA    V tach ([facility] (Chronic) ICD-10-CM: I47.2  ICD-9-CM: 427.1  6/15/2021 Yes    Nonsustained. On amiodarone now.  EF 40 to 45% per echo    * (Principal) Laceration of trachea ICD-10-CM: S11.021A  ICD-9-CM: 874.02  6/14/2021 Yes    Plans for stent tomorrow    COP[facility] exacerbation ([facility] ICD-10-CM: J44.1  ICD-9-CM: 491.21  6/11/2021 Yes    resolved Pneumomediastinum ([facility] ICD-10-CM: J98.2  ICD-9-CM: 518.1  6/10/2021 Yes    Secondary to above    Substance abuse ([facility] (Chronic) ICD-10-CM: F19.10  ICD-9-CM: 305.90  6/10/2021 Yes    Have discussed cessation - ? interest    Acute hyponatremia ICD-10-CM: E87.1  ICD-9-CM: 276.1  6/10/2021 Yes    Improved with hydration but a little low again today    Uncontrolled type II diabetes mellitus (HCC) (Chronic) ICD-10-CM: E11.65  ICD-9-CM: 250.02  6/10/2021 Yes    Off steroids but BS still in the 200s          Plan:  (Medical Decision Making)   1. Bronch with stent placement at 2 pm tomorrow   2. Off steroids but BS still in the 200s. Using SSI. Will not adjust for today since he will be NPO tonight but may need to increase dose before discharge. Was on 25 units at home and this is the current dose but poor control  3. Cardiology has signed off. EF 40 to 45%. Will need follow up with them as outpatient. Now on Amiodarone. Continue ACE and beta blocker from home. Supplementing Mg. 4. Day 7 Zosyn - yeast in bronch washings. ? Need to continue    Aneudy Levy, CEDRICK     Lungs:  CTA  Heart:  RRR with no Murmur/Rubs/Gallops    Additional Comments: He finished 7 days of IV Zosyn and no significant pathogen noted on his bronchial washing. We will stop that and plan for bronchoscopy with stent placement tomorrow. He does have known LV dysfunction and we will continue amiodarone along with beta-blockers    I have spoken with and examined the patient. I agree with the above assessment and plan as documented. Pilo Diana MD        More than 50% of time documented was spent face-to-face contact with the patient and in the care of the patient on the floor/unit where the patient is located.

## 2021-06-17 NOTE — PROGRESS NOTES
[facility]ist Progress Note     Name:  Anupama Solano  Age:55 y.o. Sex:male   :  1965    MRN:  [id number]     Admit Date:  6/10/2021    Reason for Admission:  Pneumomediastinum [facility]) [J98.2]    Assessment & Plan       Acute respiratory failure with COPD exacerbation, pneumonia, tracheal defect with pneumomediastinum:  · Weaned to room air   · D8 zosyn  · IV steroid taper completed   · Scheduled nebs, pulmicort  ·  bronch with stent for 6-18  · appreciate pulmonary  · Needs smoking cessation       Dm2:  · SSI  · Continued   lantus - NPO at [place] for tomorrows bronch        Hyponatremia:  · Corrected for hyperglycemia   · Trend BMP-improved       VTACH, HFrEF:  · followup remote tele   · Follow potassium >4.0/ magnesium >2.0- supplement   · Continued metoprolol /  Lisinopril  · Amiodarone added  · Cardiology following       Elevated LFTS, HEPC:  · Trend LFTS      Hypokalemia/hypomagnesemia:  · Replace and repeat lab         Diet:  DIET ADULT  DIET NPO  DIET NPO  DVT PPx: SCD  GI Ppx:  none  Code: Full Code    Dispo/Discharge Planning: pending       I have counseled that he should not go outside though he states he will     Hospital Course/Subjective:       Mr. Roosevelt Cristina is a 62 yo male PMH of COPD, DM2, HTN, tobacco use, methamphetamine use, HEPC, HFrEF, VTACH admitted with COPD exacerbation. CTA chest shows defect or rupture of posterior tracheal wall near thoracic inlet with air into the upper mediastinum and soft tissues, obstruction of bilateral lower bronchi. He has been seen by pulmonary and placed on nebs, pulmicort, IV steroids and zosyn. BC NGTD. He is s/p bronch 21 showing purulent mucus at vocal cords and large posterior wall tear 1 cm below vocal cords. He is pending tracheal stent placement for 21. Cardiology following for recurrent VTACH. Amiodarone added. ECHO EF 40-45%.       Discharge plans pending         Subjective/24 hr Events (21):       friend present , demanding to go outside without assistance, ate ok,  No dyspnea     Objective:     Patient Vitals for the past 24 hrs:   Temp Pulse Resp BP SpO2   06/17/21 1506     95 %   06/17/21 1458 98.9 °F (37.2 °C) 73 20 (!) 151/66 96 %   06/17/21 1124 97.6 °F (36.4 °C) 69 20 128/85 100 %   06/17/21 1111     98 %   06/17/21 0725 98.4 °F (36.9 °C) 66 18 133/70 93 %   06/17/21 0717     93 %   06/17/21 0248 97.8 °F (36.6 °C) 74 18 132/74 98 %   06/16/21 2218 97.1 °F (36.2 °C) 77 17 135/78 97 %   06/16/21 2017     97 %   06/16/21 1934 98.1 °F (36.7 °C) 75 17 127/61 96 %     Oxygen Therapy  O2 Sat (%): 95 % (06/17/21 1506)  Pulse via Oximetry: 69 beats per minute (06/17/21 1506)  O2 Device: None (Room air) (06/17/21 1506)  Skin Assessment: Clean, dry, & intact (06/15/21 1946)  O2 Flow Rate (L/min): 4 l/min (06/14/21 1537)  FIO2 (%): 21 % (06/17/21 1506)    Body mass index is 23.36 kg/m². Physical Exam:   General:     Alert , no distress    Lungs:   Clear   Cardiac:   RRR, Normal S1 and S2. No S3, S4 or murmurs.   No edema   Neuro:   Nonfoca        Data Review:  I have reviewed all labs, meds, and studies from the last 24 hours:    Labs:  Recent Results (from the past 24 hour(s))   GLUCOSE, POC    Collection Time: 06/16/21  4:13 PM   Result Value Ref Range    Glucose (POC) 287 (H) 65 - 100 mg/dL    [facility]    GLUCOSE, POC    Collection Time: 06/16/21  8:45 PM   Result Value Ref Range    Glucose (POC) 225 (H) 65 - 100 mg/dL    [facility]    METABOLIC PANEL, BASIC    Collection Time: 06/17/21  5:32 AM   Result Value Ref Range    Sodium 133 (L) 138 - 145 mmol/L    Potassium 4.1 3.5 - 5.1 mmol/L    Chloride 99 98 - 107 mmol/L    CO2 28 21 - 32 mmol/L    Anion gap 6 (L) 7 - 16 mmol/L    Glucose 292 (H) 65 - 100 mg/dL    BUN 20 6 - 23 MG/DL    Creatinine 0.73 (L) 0.8 - 1.5 MG/DL    GFR est AA >60 >60 ml/min/1.73m2    GFR est non-AA >60 >60 ml/min/1.73m2    Calcium 8.3 8.3 - 10.4 MG/DL MAGNESIUM    Collection Time: 06/17/21  5:32 AM   Result Value Ref Range    Magnesium 1.7 (L) 1.8 - 2.4 mg/dL   CBC WITH AUTOMATED DIFF    Collection Time: 06/17/21  5:32 AM   Result Value Ref Range    WBC 7.1 4.3 - 11.1 K/uL    RBC 5.30 4.23 - 5.6 M/uL    HGB 14.3 13.6 - 17.2 g/dL    HCT 42.5 41.1 - 50.3 %    MCV 80.2 79.6 - 97.8 FL    MCH 27.0 26.1 - 32.9 PG    MCHC 33.6 31.4 - 35.0 g/dL    RDW 13.5 11.9 - 14.6 %    PLATELET 672 267 - 665 K/uL    MPV 10.3 9.4 - 12.3 FL    ABSOLUTE NRBC 0.00 0.0 - 0.2 K/uL    DF AUTOMATED      NEUTROPHILS 65 43 - 78 %    LYMPHOCYTES 23 13 - 44 %    MONOCYTES 10 4.0 - 12.0 %    EOSINOPHILS 1 0.5 - 7.8 %    BASOPHILS 0 0.0 - 2.0 %    IMMATURE GRANULOCYTES 2 0.0 - 5.0 %    ABS. NEUTROPHILS 4.7 1.7 - 8.2 K/UL    ABS. LYMPHOCYTES 1.6 0.5 - 4.6 K/UL    ABS. MONOCYTES 0.7 0.1 - 1.3 K/UL    ABS. EOSINOPHILS 0.1 0.0 - 0.8 K/UL    ABS. BASOPHILS 0.0 0.0 - 0.2 K/UL    ABS. IMM.  GRANS. 0.1 0.0 - 0.5 K/UL   GLUCOSE, POC    Collection Time: 06/17/21  5:41 AM   Result Value Ref Range    Glucose (POC) 287 (H) 65 - 100 mg/dL    Performed by Rebecca    GLUCOSE, POC    Collection Time: 06/17/21 11:43 AM   Result Value Ref Range    Glucose (POC) 346 (H) 65 - 100 mg/dL    Performed by Dominga Clifford        All Micro Results     Procedure Component Value Units Date/Time    CULTURE, RESPIRATORY/SPUTUM/BRONCH Holli Shaker STAIN [[id number]]  (Abnormal) Collected: 06/14/21 1520    Order Status: Completed Specimen: Sputum from Bronchial Washing Updated: 06/17/21 0901     Special Requests: NO SPECIAL REQUESTS        GRAM STAIN 10 TO 45 WBC'S/OIF      NO EPITHELIAL CELLS SEEN         FEW GRAM POSITIVE COCCI         FEW YEAST        Culture result:       MODERATE YEAST IDENTIFICATION TO FOLLOW                  MODERATE NORMAL RESPIRATORY DEUCE                  CULTURE IN PROGRESS,FURTHER UPDATES TO FOLLOW          CULTURE, BLOOD [[id number]] Collected: 06/10/21 1825    Order Status: Completed Specimen: Blood Updated: 06/15/21 0719     Special Requests: RAC     Culture result: NO GROWTH 5 DAYS       CULTURE, BLOOD [[id number]] Collected: 06/10/21 1825    Order Status: Completed Specimen: Blood Updated: 06/15/21 0719     Special Requests: LAC     Culture result: NO GROWTH 5 DAYS       CULTURE, RESPIRATORY/SPUTUM/BRONCH Fatou Grain STAIN [[id number]] Collected: 06/11/21 0930    Order Status: Canceled Specimen: Sputum           EKG Results     Procedure 720 Value Units Date/Time    EKG, 12 LEAD, SUBSEQUENT [[id number]] Collected: 06/15/21 0129    Order Status: Completed Updated: 06/15/21 0725     Ventricular Rate 78 BPM      Atrial Rate 78 BPM      P-R Interval 190 ms      QRS Duration 146 ms      Q-T Interval 448 ms      QTC Calculation (Bezet) 510 ms      Calculated P Axis 86 degrees      Calculated R Axis -108 degrees      Calculated T Axis 77 degrees      Diagnosis --     Sinus rhythm with Premature atrial complexes  Non-specific intra-ventricular conduction block  Possible Lateral infarct (cited on or before 10-NICHOLE-2021)  Abnormal ECG  When compared with ECG of 10-NICHOLE-2021 19:54,  Premature ventricular complexes are no longer Present  Premature atrial complexes are now Present  Criteria for Inferior infarct are no longer Present  Serial changes of Lateral infarct Present  Confirmed by [facility]  MD (), ELLIS RIZVI ([id number]) on 6/15/2021 7:25:48 AM      EKG, 12 LEAD, INITIAL [[id number]] Collected: 06/10/21 1954    Order Status: Completed Updated: 06/10/21 2052     Ventricular Rate 96 BPM      Atrial Rate 96 BPM      P-R Interval 196 ms      QRS Duration 150 ms      Q-T Interval 410 ms      QTC Calculation (Bezet) 517 ms      Calculated P Axis 86 degrees      Calculated R Axis -54 degrees      Calculated T Axis 105 degrees      Diagnosis --       Sinus rhythm with sinus arrhythmia with occasional Premature ventricular   complexes  Biatrial enlargement  Non-specific intra-ventricular conduction block  Abnormal ECG  When compared with ECG of 10-NICHOLE-2021 17:32,  Premature supraventricular complexes are no longer Present  Serial changes of Lateral infarct Present  Confirmed by Willis Avalos ([id number]) on 6/10/2021 8:52:09 PM      EKG [[id number]] Collected: 06/10/21 1732    Order Status: Completed Updated: 06/10/21 1807     Ventricular Rate 89 BPM      Atrial Rate 89 BPM      P-R Interval 198 ms      QRS Duration 152 ms      Q-T Interval 450 ms      QTC Calculation (Bezet) 547 ms      Calculated P Axis 82 degrees      Calculated R Axis -64 degrees      Calculated T Axis 101 degrees      Diagnosis --     !! AGE AND GENDER SPECIFIC ECG ANALYSIS !! Sinus rhythm with Premature supraventricular complexes with occasional   Premature ventricular complexes  Biatrial enlargement  Non-specific intra-ventricular conduction block  anterior infarct, age indeterminant  Abnormal ECG  No previous ECGs available  Confirmed by Audra Montana MD (), ELLIS RIZVI ([id number]) on 6/10/2021 6:07:12 PM            Other Studies:  No results found.     Current Meds:   Current Facility-Administered Medications   Medication Dose Route Frequency    magnesium sulfate 2 g/50 ml IVPB (premix or compounded)  2 g IntraVENous ONCE    magnesium oxide (MAG-OX) tablet 400 mg  400 mg Oral BID    insulin glargine (LANTUS) injection 25 Units  25 Units SubCUTAneous QHS    calcium carbonate (TUMS) chewable tablet 400 mg [elemental]  400 mg Oral TID PRN    metoprolol succinate (TOPROL-XL) XL tablet 50 mg  50 mg Oral DAILY    amiodarone (CORDARONE) tablet 400 mg  400 mg Oral BID    albuterol (PROVENTIL VENTOLIN) nebulizer solution 2.5 mg  2.5 mg Nebulization QID RT    lisinopriL (PRINIVIL, ZESTRIL) tablet 10 mg  10 mg Oral DAILY    acetaminophen (TYLENOL) tablet 650 mg  650 mg Oral Q6H PRN    Or    acetaminophen (TYLENOL) suppository 650 mg  650 mg Rectal Q6H PRN    polyethylene glycol (MIRALAX) packet 17 g  17 g Oral DAILY PRN    ondansetron (ZOFRAN ODT) tablet 4 mg  4 mg Oral Q8H PRN Or    ondansetron (ZOFRAN) injection 4 mg  4 mg IntraVENous Q6H PRN    insulin lispro (HUMALOG) injection   SubCUTAneous AC&HS    piperacillin-tazobactam (ZOSYN) 3.375 g in 0.9% sodium chloride (MBP/ADV) 100 mL MBP  3.375 g IntraVENous Q8H    aspirin delayed-release tablet 81 mg  81 mg Oral DAILY    LORazepam (ATIVAN) injection 1 mg  1 mg IntraVENous Q6H PRN    nicotine (NICODERM CQ) 21 mg/24 hr patch 1 Patch  1 Patch TransDERmal Q24H    albuterol (PROVENTIL VENTOLIN) nebulizer solution 2.5 mg  2.5 mg Nebulization Q4H PRN    budesonide (PULMICORT) 500 mcg/2 ml nebulizer suspension  500 mcg Nebulization BID RT    sodium chloride (NS) flush 5-10 mL  5-10 mL IntraVENous Q8H    sodium chloride (NS) flush 5-10 mL  5-10 mL IntraVENous PRN       Problem List:  Hospital Problems as of 6/17/2021 Date Reviewed: 6/11/2021        Codes Class Noted - Resolved POA    V tach ([facility] (Chronic) ICD-10-CM: I47.2  ICD-9-CM: 427.1  6/15/2021 - Present Yes        * (Principal) Laceration of trachea ICD-10-CM: S11.021A  ICD-9-CM: 874.02  6/14/2021 - Present Yes        COPD exacerbation ([facility] ICD-10-CM: J44.1  ICD-9-CM: 491.21  6/11/2021 - Present Yes        Pneumomediastinum ([facility] ICD-10-CM: J98.2  ICD-9-CM: 518.1  6/10/2021 - Present Yes        Substance abuse ([facility] (Chronic) ICD-10-CM: F19.10  ICD-9-CM: 305.90  6/10/2021 - Present Yes        Acute hyponatremia ICD-10-CM: E87.1  ICD-9-CM: 276.1  6/10/2021 - Present Yes        Uncontrolled type II diabetes mellitus (HCC) (Chronic) ICD-10-CM: E11.65  ICD-9-CM: 250.02  6/10/2021 - Present Yes        RESOLVED: Dehydration ICD-10-CM: E86.0  ICD-9-CM: 276.51  6/10/2021 - 6/16/2021 Yes                   Part of this note was written by using a voice dictation software and the note has been proof read but may still contain some grammatical/other typographical errors.     Signed By: Sandy Croft MD   [facility]    June 17, 2021  3:16 PM

## 2021-06-17 NOTE — PROGRESS NOTES
Patient advised not to leave the floor by this RN and by MD.  Patient is seen outside smoking a cigarette at this time.

## 2021-06-17 NOTE — PROGRESS NOTES
[facility] CARDIOLOGY PROGRESS NOTE    6/17/2021 9:42 AM    Admit Date: 6/10/2021        Subjective:   Stable overnight without angina, CHF, or palpitations. Vitals stable and controlled. No other complaints overnight. Tolerating meds well. Seems to be at reasonable baseline    Objective:      Vitals:    06/16/21 2017 06/16/21 2218 06/17/21 0246 06/17/21 0248   BP:  135/78  132/74   Pulse:  77  74   Resp:  17  18   Temp:  97.1 °F (36.2 °C)  97.8 °F (36.6 °C)   SpO2: 97% 97%  98%   Weight:   167 lb 8 oz (76 kg)    Height:           Physical Exam:   Neck- supple, no JVD at 60 deg  CV- regular rate and rhythm no MRG  Lung- clear bilaterally  Abd- soft, nontender, nondistended  Ext- no edema  Skin- warm and dry    Data Review:   Recent Labs     06/17/21  0532 06/16/21  0543 06/15/21  0233   NA  --  136* 137   K  --  4.2 4.1   MG  --  1.9 2.2   BUN  --  19 20   CREA  --  0.95 1.09   GLU  --  222* 303*   WBC 7.1 9.0  --    HGB 14.3 14.6  --    HCT 42.5 44.1  --     169  --      Echo 6/15/21:    · LV: Estimated LVEF is 40 - 45%. Normal cavity size. Moderate concentric hypertrophy. Left ventricular diastolic dysfunction. · Echo study was technically difficulty. · Contrast used: DEFINITY. · LA: Mildly dilated left atrium. ·   Assessment and Plan:     1. Chronic systolic CHF: Poor prognosis given his noncompliance and lifestyle/substance use history. Dr. Astrid Sullivan discussed this with patient and his wife yesterday. He seems disengaged with his health condition. Will continue Toprol and lisinopril therapy, titrate as BP and HR allow. Echo has been ordered and will be reviewed. No plans for cardiac catheterization given his acute illness and noncompliance. 2.  Nonsustained ventricular tachycardia: Started amiodarone - continue taper as tolerated. Monitor on telemetry. Not ICD or LifeVest candidate given his noncompliance.   If he were to take his medications consistently and follow-up in our office, we would proceed with further evaluation for ICD in the future. 3.  Respiratory failure: Therapy as directed by pulmonary. Stable and lying flat comfortably this AM without orthopnea, SOB, or wheezing. 4.  Tobacco/substance abuse: Discussed need for cessation. 5. Hypokalemia- recheck BMP and Mg in AM, replete as needed, K>4 and Mg>2 if possible. 6. COPD exacerbation- improved, smoking cessation discussed, no SOB or wheezing and no clinical signs of volume overload today.   Bronch with stent 6/18 - continue nebs/steroid taper, titrate meds as tolerated    Call if needed

## 2021-06-17 NOTE — PROGRESS NOTES
Patient went off floor with wife after being instructed not to go off floor or outside by staff and Dr Martínez Cui

## 2021-06-18 ENCOUNTER — HOSPITAL ENCOUNTER (INPATIENT)
Dept: GENERAL RADIOLOGY | Age: 56
DRG: 143 | End: 2021-06-18
Attending: INTERNAL MEDICINE
Payer: COMMERCIAL

## 2021-06-18 ENCOUNTER — ANESTHESIA (OUTPATIENT)
Dept: ENDOSCOPY | Age: 56
DRG: 143 | End: 2021-06-18
Payer: COMMERCIAL

## 2021-06-18 VITALS
WEIGHT: 168.1 LBS | HEART RATE: 73 BPM | BODY MASS INDEX: 23.53 KG/M2 | HEIGHT: 71 IN | SYSTOLIC BLOOD PRESSURE: 146 MMHG | RESPIRATION RATE: 20 BRPM | TEMPERATURE: 98.6 F | DIASTOLIC BLOOD PRESSURE: 75 MMHG | OXYGEN SATURATION: 99 %

## 2021-06-18 LAB
ANION GAP SERPL CALC-SCNC: 6 MMOL/L (ref 7–16)
BACTERIA SPEC CULT: ABNORMAL
BACTERIA SPEC CULT: ABNORMAL
BASOPHILS # BLD: 0 K/UL (ref 0–0.2)
BASOPHILS NFR BLD: 0 % (ref 0–2)
BUN SERPL-MCNC: 16 MG/DL (ref 6–23)
CALCIUM SERPL-MCNC: 8.4 MG/DL (ref 8.3–10.4)
CHLORIDE SERPL-SCNC: 104 MMOL/L (ref 98–107)
CO2 SERPL-SCNC: 28 MMOL/L (ref 21–32)
CREAT SERPL-MCNC: 0.69 MG/DL (ref 0.8–1.5)
DIFFERENTIAL METHOD BLD: NORMAL
EOSINOPHIL # BLD: 0.1 K/UL (ref 0–0.8)
EOSINOPHIL NFR BLD: 1 % (ref 0.5–7.8)
ERYTHROCYTE [DISTWIDTH] IN BLOOD BY AUTOMATED COUNT: 13.8 % (ref 11.9–14.6)
GLUCOSE BLD STRIP.AUTO-MCNC: 173 MG/DL (ref 65–100)
GLUCOSE BLD STRIP.AUTO-MCNC: 92 MG/DL (ref 65–100)
GLUCOSE SERPL-MCNC: 95 MG/DL (ref 65–100)
GRAM STN SPEC: ABNORMAL
HCT VFR BLD AUTO: 43.8 % (ref 41.1–50.3)
HGB BLD-MCNC: 14.1 G/DL (ref 13.6–17.2)
IMM GRANULOCYTES # BLD AUTO: 0.2 K/UL (ref 0–0.5)
IMM GRANULOCYTES NFR BLD AUTO: 2 % (ref 0–5)
LYMPHOCYTES # BLD: 2.8 K/UL (ref 0.5–4.6)
LYMPHOCYTES NFR BLD: 28 % (ref 13–44)
MAGNESIUM SERPL-MCNC: 1.7 MG/DL (ref 1.8–2.4)
MCH RBC QN AUTO: 26.4 PG (ref 26.1–32.9)
MCHC RBC AUTO-ENTMCNC: 32.2 G/DL (ref 31.4–35)
MCV RBC AUTO: 82 FL (ref 79.6–97.8)
MONOCYTES # BLD: 0.9 K/UL (ref 0.1–1.3)
MONOCYTES NFR BLD: 9 % (ref 4–12)
NEUTS SEG # BLD: 5.9 K/UL (ref 1.7–8.2)
NEUTS SEG NFR BLD: 59 % (ref 43–78)
NRBC # BLD: 0 K/UL (ref 0–0.2)
PLATELET # BLD AUTO: 192 K/UL (ref 150–450)
PMV BLD AUTO: 10.3 FL (ref 9.4–12.3)
POTASSIUM SERPL-SCNC: 4.1 MMOL/L (ref 3.5–5.1)
RBC # BLD AUTO: 5.34 M/UL (ref 4.23–5.6)
SERVICE CMNT-IMP: ABNORMAL
SERVICE CMNT-IMP: ABNORMAL
SERVICE CMNT-IMP: NORMAL
SODIUM SERPL-SCNC: 138 MMOL/L (ref 138–145)
WBC # BLD AUTO: 10 K/UL (ref 4.3–11.1)

## 2021-06-18 PROCEDURE — 94760 N-INVAS EAR/PLS OXIMETRY 1: CPT

## 2021-06-18 PROCEDURE — 36415 COLL VENOUS BLD VENIPUNCTURE: CPT

## 2021-06-18 PROCEDURE — 74011250637 HC RX REV CODE- 250/637: Performed by: PHYSICIAN ASSISTANT

## 2021-06-18 PROCEDURE — 74011000250 HC RX REV CODE- 250: Performed by: NURSE PRACTITIONER

## 2021-06-18 PROCEDURE — 85025 COMPLETE CBC W/AUTO DIFF WBC: CPT

## 2021-06-18 PROCEDURE — 99232 SBSQ HOSP IP/OBS MODERATE 35: CPT | Performed by: INTERNAL MEDICINE

## 2021-06-18 PROCEDURE — 80048 BASIC METABOLIC PNL TOTAL CA: CPT

## 2021-06-18 PROCEDURE — 74011000258 HC RX REV CODE- 258: Performed by: NURSE PRACTITIONER

## 2021-06-18 PROCEDURE — 74011250637 HC RX REV CODE- 250/637: Performed by: INTERNAL MEDICINE

## 2021-06-18 PROCEDURE — 94640 AIRWAY INHALATION TREATMENT: CPT

## 2021-06-18 PROCEDURE — 74011250636 HC RX REV CODE- 250/636: Performed by: NURSE PRACTITIONER

## 2021-06-18 PROCEDURE — 83735 ASSAY OF MAGNESIUM: CPT

## 2021-06-18 PROCEDURE — 74011250637 HC RX REV CODE- 250/637: Performed by: NURSE PRACTITIONER

## 2021-06-18 PROCEDURE — 82962 GLUCOSE BLOOD TEST: CPT

## 2021-06-18 PROCEDURE — 74011250637 HC RX REV CODE- 250/637: Performed by: HOSPITALIST

## 2021-06-18 RX ORDER — LANOLIN ALCOHOL/MO/W.PET/CERES
400 CREAM (GRAM) TOPICAL 2 TIMES DAILY
Qty: 30 TABLET | Refills: 0 | Status: SHIPPED | OUTPATIENT
Start: 2021-06-18

## 2021-06-18 RX ORDER — METOPROLOL SUCCINATE 50 MG/1
50 TABLET, EXTENDED RELEASE ORAL DAILY
Qty: 30 TABLET | Refills: 0 | Status: SHIPPED | OUTPATIENT
Start: 2021-06-19

## 2021-06-18 RX ORDER — AMIODARONE HYDROCHLORIDE 400 MG/1
400 TABLET ORAL 2 TIMES DAILY
Qty: 14 TABLET | Refills: 0 | Status: SHIPPED | OUTPATIENT
Start: 2021-06-18

## 2021-06-18 RX ORDER — MAGNESIUM SULFATE HEPTAHYDRATE 40 MG/ML
4 INJECTION, SOLUTION INTRAVENOUS ONCE
Status: COMPLETED | OUTPATIENT
Start: 2021-06-18 | End: 2021-06-18

## 2021-06-18 RX ADMIN — METOPROLOL SUCCINATE 50 MG: 50 TABLET, EXTENDED RELEASE ORAL at 08:54

## 2021-06-18 RX ADMIN — BUDESONIDE 500 MCG: 0.5 INHALANT RESPIRATORY (INHALATION) at 08:14

## 2021-06-18 RX ADMIN — LISINOPRIL 10 MG: 5 TABLET ORAL at 08:54

## 2021-06-18 RX ADMIN — ALBUTEROL SULFATE 2.5 MG: 2.5 SOLUTION RESPIRATORY (INHALATION) at 08:14

## 2021-06-18 RX ADMIN — PIPERACILLIN SODIUM AND TAZOBACTAM SODIUM 3.38 G: 3; .375 INJECTION, POWDER, LYOPHILIZED, FOR SOLUTION INTRAVENOUS at 05:55

## 2021-06-18 RX ADMIN — SODIUM CHLORIDE 10 ML: 9 INJECTION, SOLUTION INTRAMUSCULAR; INTRAVENOUS; SUBCUTANEOUS at 08:56

## 2021-06-18 RX ADMIN — SODIUM CHLORIDE 5 ML: 9 INJECTION, SOLUTION INTRAMUSCULAR; INTRAVENOUS; SUBCUTANEOUS at 05:58

## 2021-06-18 RX ADMIN — MAGNESIUM SULFATE HEPTAHYDRATE 4 G: 40 INJECTION, SOLUTION INTRAVENOUS at 08:56

## 2021-06-18 NOTE — PROGRESS NOTES
TRANSFER - OUT REPORT:    Verbal report given to Stephanie Turner RN on Aniya Ortega  being transferred to GI lab for ordered procedure       Report consisted of patients Situation, Background, Assessment and   Recommendations(SBAR). Information from the following report(s) SBAR, Kardex, Intake/Output, MAR, Accordion, Recent Results and Med Rec Status was reviewed with the receiving nurse. Lines:   Peripheral IV 06/17/21 Anterior;Distal;Right Wrist (Active)   Site Assessment Clean, dry, & intact 06/18/21 0705   Phlebitis Assessment 0 06/18/21 0705   Infiltration Assessment 0 06/18/21 0705   Dressing Status Clean, dry, & intact 06/18/21 0705   Dressing Type Tape;Transparent 06/18/21 0705   Hub Color/Line Status Infusing 06/18/21 8035        Opportunity for questions and clarification was provided.       Patient transported with:   Roll20

## 2021-06-18 NOTE — PROGRESS NOTES
[facility]  Admission Date: 6/10/2021             Daily Progress Note: 6/18/2021      The patient's chart is reviewed and the patient is discussed with the staff. Ulysses Sander y.dionisio.  male, PMH DM, HTN, tobacco abuse, methamphetamine abuse, Hep C positive in 2018, and cervical disc disorder, seen and evaluated at the request of Kimberli Whelan for pneumomediastinum.  The patient presented to the ED here via EMS with c/o productive cough w/ yellow sputum and shortness of breath X 3 days.  O2 sat on RA was noted to be 88%. CT Chest was performed to r/o PE which revealed no PE but did indicate pneumomediastinum and obstruction of the lower lobar bronchi, likely mucus impaction. WBC has went from 11.6 on admission to 16.1. The patient was started on Zosyn and is on O2 at 3L NC.  Bronch on 6/15 - 3X2 cm posterior wall tear noted 1 cm below vocal cords but no visible communication seen. Purulent material removed with ? Foreign body. Material sent for path - noted cartilage and bone. Culture reported yeast.  Plans for stent 6/18         The patient was admitted to [address] [place] [address] in March of this year after presenting to the ED at [facility] with c/o cough, dysphonia, and worsening dyspnea.  Laryngoscopy was performed and demonstrated nodular mass right vocal cord and thick whitish exudate overlying both vocal cords. At discharge from that admission the patient was placed on Augmentin and Nystatin for vocal cord mass vs lagryngeal thrush.  The patient had heavy Haemophilus influenza growth in sputum culture.  Echocardiogram during that admission revealed EF 35-40% with septal hypokinesis and grade 1 diastolic dysfunction.        Ventricular ectopy noted 6/15 - cardiology consulted. Echo here with EF of 40 to 45%, started on Amiodarone, ACE and beta blocker. No plans for ICD with history of noncompliance. To follow up with cardiology as outpatient. Subjective:       Awake and hungry.   Did not get early breakfast.  Discussed procedure for today.       Current Facility-Administered Medications   Medication Dose Route Frequency    magnesium sulfate 4 g/100 mL IVPB  4 g IntraVENous ONCE    magnesium oxide (MAG-OX) tablet 400 mg  400 mg Oral BID    [Held by provider] insulin glargine (LANTUS) injection 25 Units  25 Units SubCUTAneous QHS    calcium carbonate (TUMS) chewable tablet 400 mg [elemental]  400 mg Oral TID PRN    metoprolol succinate (TOPROL-XL) XL tablet 50 mg  50 mg Oral DAILY    amiodarone (CORDARONE) tablet 400 mg  400 mg Oral BID    albuterol (PROVENTIL VENTOLIN) nebulizer solution 2.5 mg  2.5 mg Nebulization QID RT    lisinopriL (PRINIVIL, ZESTRIL) tablet 10 mg  10 mg Oral DAILY    acetaminophen (TYLENOL) tablet 650 mg  650 mg Oral Q6H PRN    Or    acetaminophen (TYLENOL) suppository 650 mg  650 mg Rectal Q6H PRN    polyethylene glycol (MIRALAX) packet 17 g  17 g Oral DAILY PRN    ondansetron (ZOFRAN ODT) tablet 4 mg  4 mg Oral Q8H PRN    Or    ondansetron (ZOFRAN) injection 4 mg  4 mg IntraVENous Q6H PRN    insulin lispro (HUMALOG) injection   SubCUTAneous AC&HS    aspirin delayed-release tablet 81 mg  81 mg Oral DAILY    LORazepam (ATIVAN) injection 1 mg  1 mg IntraVENous Q6H PRN    nicotine (NICODERM CQ) 21 mg/24 hr patch 1 Patch  1 Patch TransDERmal Q24H    albuterol (PROVENTIL VENTOLIN) nebulizer solution 2.5 mg  2.5 mg Nebulization Q4H PRN    budesonide (PULMICORT) 500 mcg/2 ml nebulizer suspension  500 mcg Nebulization BID RT    sodium chloride (NS) flush 5-10 mL  5-10 mL IntraVENous Q8H    sodium chloride (NS) flush 5-10 mL  5-10 mL IntraVENous PRN         Objective:     Vitals:    06/17/21 2234 06/18/21 0210 06/18/21 0745 06/18/21 0814   BP: (!) 142/83 136/74 120/68    Pulse: 79 74 71    Resp: 20 17 18    Temp: 98.1 °F (36.7 °C) 97.8 °F (36.6 °C) 98.6 °F (37 °C)    SpO2: 99% 99% 99% 97%   Weight:  168 lb 1.6 oz (76.2 kg)     Height:         Intake and Output: 06/16 1901 - 06/18 0700  In: 360 [P.O.:360]  Out: 2100 [Urine:2100]  No intake/output data recorded. Physical Exam:   Constitutional:  the patient is well developed and in no acute distress  HEENT:  Sclera clear, pupils equal, oral mucosa moist  Lungs: clear bilaterally. On room air  Cardiovascular:  RRR without M,G,R  Abd/GI: soft and non-tender; with positive bowel sounds. Ext: warm without cyanosis. There is no lower leg edema. Musculoskeletal: moves all four extremities with equal strength  Skin:  no jaundice or rashes, no wounds   Neuro: no gross neuro deficits   Musculoskeletal: can ambulate. No deformity  Psychiatric: Calm. Review of Systems - Respiratory ROS: no cough, shortness of breath, or wheezing  Cardiovascular ROS: no chest pain or dyspnea on exertion  Gastrointestinal ROS: no abdominal pain, change in bowel habits, or black or bloody stools   Lines: peripheral IV    CHEST XRAY:  None today    Echo:       LAB  Recent Labs     06/18/21  0600 06/17/21  0532 06/16/21  0543   WBC 10.0 7.1 9.0   HGB 14.1 14.3 14.6   HCT 43.8 42.5 44.1    155 169     Recent Labs     06/18/21  0600 06/17/21  0532 06/16/21  0543    133* 136*   K 4.1 4.1 4.2    99 100   CO2 28 28 29   GLU 95 292* 222*   BUN 16 20 19   CREA 0.69* 0.73* 0.95   MG 1.7* 1.7* 1.9       Assessment:  (Medical Decision Making)     Hospital Problems  Date Reviewed: 6/11/2021        Codes Class Noted POA    V tach ([facility] (Chronic) ICD-10-CM: I47.2  ICD-9-CM: 427.1  6/15/2021 Yes    Nonsustained. On amiodarone now. EF 40 to 45% per echo.  Mg still a little low    * (Principal) Laceration of trachea ICD-10-CM: S11.021A  ICD-9-CM: 874.02  6/14/2021 Yes    Plans for stent tomorrow    COPD exacerbation ([facility] ICD-10-CM: J44.1  ICD-9-CM: 491.21  6/11/2021 Yes    resolved    Pneumomediastinum ([facility] ICD-10-CM: J98.2  ICD-9-CM: 518.1  6/10/2021 Yes    Secondary to above    Substance abuse (HCC) (Chronic) ICD-10-CM: F19.10  ICD-9-CM: 305.90  6/10/2021 Yes    Have discussed cessation - ? interest    Acute hyponatremia ICD-10-CM: E87.1  ICD-9-CM: 276.1  6/10/2021 Yes    Improved with hydration     Uncontrolled type II diabetes mellitus (HCC) (Chronic) ICD-10-CM: E11.65  ICD-9-CM: 250.02  6/10/2021 Yes    BS down to 92 this AM - on lantus and now NPO          Plan:  (Medical Decision Making)   1. Bronch with stent placement at 2 pm today  2. Off steroids and BS down to 92 today (nurse providing liquid breakfast now) then NPO for procedure. Will place Lantus on hold and restart when diet restarted. 3. Cardiology has signed off. EF 40 to 45%. Will need follow up with them as outpatient. Now on Amiodarone. Continue ACE and beta blocker from home. Supplementing Mg orally - add IV dose today  4. Completed 7 day course of zosyn on 6/17. Bronchial washings negative. Elbert Stewart NP       More than 50% of time documented was spent face-to-face contact with the patient and in the care of the patient on the floor/unit where the patient is located. The patient has been seen and examined by me personally, the chart,labs, and radiographic studies have been reviewed. Chest: CTA  Extremities: trace edema      I agree with the above assessment and plan.   For Stent placement today    Ulisses Delgadillo MD.

## 2021-06-18 NOTE — PROGRESS NOTES
Pt remains NPO. SQBS 92.  Pt remains on room air. No distress. Able to rest in long interval tonight. Will update oncoming nurse.

## 2021-06-18 NOTE — PROGRESS NOTES
Pt will be NPO for surgery tomorrow. Perfect served Dr. Briseida Mathews. New orders to hold Lantus tonight.

## 2021-06-18 NOTE — PROGRESS NOTES
Patient removed tele monitor. Refuses to wear. Uncooperative this a.m. Dr Agudelo Hard notified in regards.

## 2021-06-18 NOTE — PROGRESS NOTES
Problem: Falls - Risk of  Goal: *Absence of Falls  Description: Document Alexi Schimke Fall Risk and appropriate interventions in the flowsheet. 6/18/2021 0805 by Maria Antonia Telles RN  Outcome: Progressing Towards Goal  Note: Fall Risk Interventions:  Mobility Interventions: Bed/chair exit alarm, Strengthening exercises (ROM-active/passive)         Medication Interventions: Assess postural VS orthostatic hypotension, Bed/chair exit alarm, Patient to call before getting OOB    Elimination Interventions: Call light in reach           6/18/2021 0805 by Maria Antonia Telles RN  Outcome: Progressing Towards Goal  Note: Fall Risk Interventions:  Mobility Interventions: Bed/chair exit alarm, Strengthening exercises (ROM-active/passive)         Medication Interventions: Assess postural VS orthostatic hypotension, Bed/chair exit alarm, Patient to call before getting OOB    Elimination Interventions: Call light in reach              Problem: Patient Education: Go to Patient Education Activity  Goal: Patient/Family Education  6/18/2021 0805 by Maria Antonia Telles RN  Outcome: Progressing Towards Goal  6/18/2021 0805 by Maria Antonia Telles RN  Outcome: Progressing Towards Goal     Problem: Pressure Injury - Risk of  Goal: *Prevention of pressure injury  Description: Document Kenneth Scale and appropriate interventions in the flowsheet. 6/18/2021 0805 by Maria Antonia Telles RN  Outcome: Progressing Towards Goal  Note: Pressure Injury Interventions:  Sensory Interventions: Assess changes in LOC, Discuss PT/OT consult with provider, Keep linens dry and wrinkle-free, Maintain/enhance activity level, Minimize linen layers, Pressure redistribution bed/mattress (bed type), Turn and reposition approx.  every two hours (pillows and wedges if needed)    Moisture Interventions: Minimize layers    Activity Interventions: PT/OT evaluation    Mobility Interventions: Assess need for specialty bed, HOB 30 degrees or less, Pressure redistribution bed/mattress (bed type), Turn and reposition approx. every two hours(pillow and wedges)    Nutrition Interventions: Document food/fluid/supplement intake    Friction and Shear Interventions: Minimize layers             6/18/2021 0805 by Megha Garner RN  Outcome: Progressing Towards Goal  Note: Pressure Injury Interventions:  Sensory Interventions: Assess changes in LOC, Discuss PT/OT consult with provider, Keep linens dry and wrinkle-free, Maintain/enhance activity level, Minimize linen layers, Pressure redistribution bed/mattress (bed type), Turn and reposition approx. every two hours (pillows and wedges if needed)    Moisture Interventions: Minimize layers    Activity Interventions: PT/OT evaluation    Mobility Interventions: Assess need for specialty bed, HOB 30 degrees or less, Pressure redistribution bed/mattress (bed type), Turn and reposition approx.  every two hours(pillow and wedges)    Nutrition Interventions: Document food/fluid/supplement intake    Friction and Shear Interventions: Minimize layers                Problem: Patient Education: Go to Patient Education Activity  Goal: Patient/Family Education  6/18/2021 0805 by Megha Garner RN  Outcome: Progressing Towards Goal  6/18/2021 0805 by Megha Garner RN  Outcome: Progressing Towards Goal     Problem: Breathing Pattern - Ineffective  Goal: *Absence of hypoxia  6/18/2021 0805 by Megha Garner RN  Outcome: Progressing Towards Goal  6/18/2021 0805 by Megha Garner RN  Outcome: Progressing Towards Goal  Goal: *Use of effective breathing techniques  6/18/2021 0805 by Megha Garner RN  Outcome: Progressing Towards Goal  6/18/2021 0805 by Megha Garner RN  Outcome: Progressing Towards Goal  Goal: *PALLIATIVE CARE:  Alleviation of Dyspnea  6/18/2021 0805 by Megha Garner RN  Outcome: Progressing Towards Goal  6/18/2021 0805 by Megha Garner RN  Outcome: Progressing Towards Goal     Problem: Patient Education: Go to Patient Education Activity  Goal: Patient/Family Education  6/18/2021 0805 by Rosamaria Ashby RN  Outcome: Progressing Towards Goal  6/18/2021 0805 by Rosamaria Ashby, RN  Outcome: Progressing Towards Goal

## 2021-06-18 NOTE — PROGRESS NOTES
TRANSFER - IN REPORT:    Verbal report received from [facility] on [facility]  being received from [phone] for ordered procedure      Report consisted of patients Situation, Background, Assessment and   Recommendations(SBAR). Information from the following report(s) SBAR, Intake/Output, MAR, Recent Results, Med Rec Status and Pre Procedure Checklist was reviewed with the receiving nurse. Opportunity for questions and clarification was provided.

## 2021-06-18 NOTE — PROGRESS NOTES
Patient verbally arguing with girlfriend at bedside; stating \"I am not going to have no procedure today\"  sqbs 173, held SSI d/t patient NPO status. Dr Severo Rock notified in regards.

## 2021-06-18 NOTE — DISCHARGE SUMMARY
[facility] Hospitalist Discharge Summary     Name:  Elvia Da Silva    Age:55 y.o. Sex:male  :  1965       MRN:  [id number]       Admitting Physician: Jocelyn Vigil MD Admit Date: 6/10/2021  5:12 PM   Attending Physician: Forest Casillas MD  Primary Care Physician: None       Discharge Physician: Sandy Croft MD  Discharge date: 21   Discharged Condition: Stable    Indication for Admission:   Chief Complaint   Patient presents with    Shortness of Breath        Reasons for hospitalization:  Hospital Problems as of 2021 Date Reviewed: 2021        Codes Class Noted - Resolved POA    [facility] ([facility] (Chronic) ICD-10-CM: I47.2  ICD-9-CM: 427.1  6/15/2021 - Present Yes        * (Principal) Laceration of trachea ICD-10-CM: S11.021A  ICD-9-CM: 874.02  2021 - Present Yes        COPD exacerbation ([facility] ICD-10-CM: J44.1  ICD-9-CM: 491.21  2021 - Present Yes        Pneumomediastinum ([facility] ICD-10-CM: J98.2  ICD-9-CM: 518.1  6/10/2021 - Present Yes        Substance abuse ([facility] (Chronic) ICD-10-CM: F19.10  ICD-9-CM: 305.90  6/10/2021 - Present Yes        Acute hyponatremia ICD-10-CM: E87.1  ICD-9-CM: 276.1  6/10/2021 - Present Yes        Uncontrolled type II diabetes mellitus ([facility] (Chronic) ICD-10-CM: E11.65  ICD-9-CM: 250.02  6/10/2021 - Present Yes        RESOLVED: Dehydration ICD-10-CM: E86.0  ICD-9-CM: 276.51  6/10/2021 - 2021 Yes                 Discharge Diagnosis: COPD exacerbation, rupture of posterior tracheal wall near thoracic inlet with air into the upper mediastinum and soft tissues, obstruction of bilateral lower bronchi      Did Patient have Sepsis (YES OR NO): no      Hospital Course :  Mr. Hasmukh Steiner is a 62 yo male PMH of COPD, DM2, HTN, tobacco use, methamphetamine use, HEPC, HFrEF, VTACH admitted with COPD exacerbation.    CTA chest shows defect or rupture of posterior tracheal wall near thoracic inlet with air into the upper mediastinum and soft tissues, obstruction of bilateral lower bronchi. He has been seen by pulmonary and placed on nebs, pulmicort, IV steroids and zosyn. BC NGTD. He is s/p bronch 6-14-21 showing purulent mucus at vocal cords and large posterior wall tear 1 cm below vocal cords. He is pending tracheal stent placement for 6-18-21. Cardiology following for recurrent VTACH. Amiodarone added. ECHO EF 40-45%.       He has repetitively stated that he is going outside, has left the floor against advice and now is leaving AMA despite conversations that this might result in death        Consults: 55 Spencer Chavez     Disposition: Home or Self Care  Diet: DIET NPO  DIET NPO  Code Status: Full Code      Discharge Info:     Current Discharge Medication List      START taking these medications    Details   amiodarone (PACERONE) 400 mg tablet Take 1 Tablet by mouth two (2) times a day. Qty: 14 Tablet, Refills: 0  Start date: 6/18/2021      magnesium oxide (MAG-OX) 400 mg tablet Take 1 Tablet by mouth two (2) times a day. Qty: 30 Tablet, Refills: 0  Start date: 6/18/2021      metoprolol succinate (TOPROL-XL) 50 mg XL tablet Take 1 Tablet by mouth daily. Qty: 30 Tablet, Refills: 0  Start date: 6/19/2021         CONTINUE these medications which have NOT CHANGED    Details   insulin glargine (Lantus Solostar U-100 Insulin) 100 unit/mL (3 mL) inpn 25 Units by SubCUTAneous route nightly. lisinopriL (PRINIVIL, ZESTRIL) 10 mg tablet Take 10 mg by mouth daily. aspirin delayed-release 81 mg tablet Take 81 mg by mouth daily.          STOP taking these medications       metoprolol tartrate (LOPRESSOR) 25 mg tablet Comments:   Reason for Stopping:               Medications Discontinued During This Encounter   Medication Reason    insulin glargine (LANTUS) injection 20 Units     insulin lispro (HUMALOG) injection DUPLICATE ORDER    insulin glargine (LANTUS) injection 35 Units     albuterol-ipratropium (DUO-NEB) 2.5 MG-0.5 MG/3 ML     0.9% sodium chloride infusion     insulin glargine (LANTUS) injection 35 Units     methylPREDNISolone (PF) (SOLU-MEDROL) injection 60 mg     insulin glargine (LANTUS) injection 40 Units     insulin glargine (LANTUS) injection 50 Units     methylPREDNISolone (PF) (SOLU-MEDROL) injection 60 mg     acetylcysteine (MUCOMYST) 100 mg/mL (10 %) nebulizer solution 400 mg Availability    albuterol (PROVENTIL VENTOLIN) nebulizer solution 2.5 mg     methylPREDNISolone (PF) (Solu-MEDROL) injection 60 mg     acetylcysteine (MUCOMYST) 200 mg/mL (20 %) solution 400 mg     methylPREDNISolone (PF) (Solu-MEDROL) injection 40 mg     metoprolol tartrate (LOPRESSOR) tablet 25 mg     insulin glargine (LANTUS) injection 20 Units     guaiFENesin ER (MUCINEX) tablet 1,200 mg     piperacillin-tazobactam (ZOSYN) 3.375 g in 0.9% sodium chloride (MBP/ADV) 100 mL MBP          Follow Up Orders: Follow-up Appointments   Procedures    FOLLOW UP VISIT Appointment in: Other (Specify) Per PCP, [facility] cardiology or [facility] cardiology 2 weeks, [facility] [facility] asap     Per PCP, [facility] cardiology or [facility] cardiology 2 weeks, [place] [facility] asap     Standing Status:   Standing     Number of Occurrences:   1     Order Specific Question:   Appointment in     Answer:    Other (Specify)       Follow-up Information     Follow up With Specialties Details Why Contact Info    None    None (395) Patient stated that they have no PCP              Discharge Exam:    Patient Vitals for the past 24 hrs:   Temp Pulse Resp BP SpO2   06/18/21 1142  73 20 (!) 146/75 99 %   06/18/21 0814     97 %   06/18/21 0745 98.6 °F (37 °C) 71 18 120/68 99 %   06/18/21 0210 97.8 °F (36.6 °C) 74 17 136/74 99 %   06/17/21 2234 98.1 °F (36.7 °C) 79 20 (!) 142/83 99 %   06/17/21 2035     100 %   06/17/21 1912 98.4 °F (36.9 °C) 80 18 105/61 98 %   06/17/21 1506     95 %   06/17/21 1458 98.9 °F (37.2 °C) 73 20 (!) 151/66 96 %     Oxygen Therapy  O2 Sat (%): 99 % (06/18/21 1142)  Pulse via Oximetry: 70 beats per minute (06/18/21 0814)  O2 Device: None (Room air) (06/18/21 0814)  Skin Assessment: Clean, dry, & intact (06/15/21 1946)  O2 Flow Rate (L/min): 4 l/min (06/14/21 1537)  FIO2 (%): 21 % (06/17/21 2035)    Estimated body mass index is 23.45 kg/m² as calculated from the following:    Height as of this encounter: 5' 11\" (1.803 m). Weight as of this encounter: 76.2 kg (168 lb 1.6 oz). Intake/Output Summary (Last 24 hours) at 6/18/2021 1238  Last data filed at 6/18/2021 0559  Gross per 24 hour   Intake    Output 1400 ml   Net -1400 ml       *Note that automatically entered I/Os may not be accurate; dependent on patient compliance with collection and accurate  by assistants.     Physical Exam:   General: No acute distress, speaking in full sentences, no use of accessory muscles   Lungs: Clear to auscultation bilaterally   Cardiovascular: Regular rate and rhythm with normal S1 and S2 , no edema   Abdomen: Soft, nontender, nondistended, normoactive bowel sounds   Extremities: No cyanosis clubbing or edema   Psych: Normal affect       All Labs from Last 24 Hrs:  Recent Results (from the past 24 hour(s))   GLUCOSE, POC    Collection Time: 06/17/21  3:57 PM   Result Value Ref Range    Glucose (POC) 289 (H) 65 - 100 mg/dL    Performed by [address] [place] [address], POC    Collection Time: 06/17/21  8:13 PM   Result Value Ref Range    Glucose (POC) 320 (H) 65 - 100 mg/dL    [facility]    GLUCOSE, POC    Collection Time: 06/18/21  5:59 AM   Result Value Ref Range    Glucose (POC) 92 65 - 100 mg/dL    Performed by [facility]    METABOLIC PANEL, BASIC    Collection Time: 06/18/21  6:00 AM   Result Value Ref Range    Sodium 138 138 - 145 mmol/L    Potassium 4.1 3.5 - 5.1 mmol/L    Chloride 104 98 - 107 mmol/L    CO2 28 21 - 32 mmol/L    Anion gap 6 (L) 7 - 16 mmol/L    Glucose 95 65 - 100 mg/dL    BUN 16 6 - 23 MG/DL Creatinine 0.69 (L) 0.8 - 1.5 MG/DL    GFR est AA >60 >60 ml/min/1.73m2    GFR est non-AA >60 >60 ml/min/1.73m2    Calcium 8.4 8.3 - 10.4 MG/DL   MAGNESIUM    Collection Time: 06/18/21  6:00 AM   Result Value Ref Range    Magnesium 1.7 (L) 1.8 - 2.4 mg/dL   CBC WITH AUTOMATED DIFF    Collection Time: 06/18/21  6:00 AM   Result Value Ref Range    WBC 10.0 4.3 - 11.1 K/uL    RBC 5.34 4.23 - 5.6 M/uL    HGB 14.1 13.6 - 17.2 g/dL    HCT 43.8 41.1 - 50.3 %    MCV 82.0 79.6 - 97.8 FL    MCH 26.4 26.1 - 32.9 PG    MCHC 32.2 31.4 - 35.0 g/dL    RDW 13.8 11.9 - 14.6 %    PLATELET 678 930 - 966 K/uL    MPV 10.3 9.4 - 12.3 FL    ABSOLUTE NRBC 0.00 0.0 - 0.2 K/uL    DF AUTOMATED      NEUTROPHILS 59 43 - 78 %    LYMPHOCYTES 28 13 - 44 %    MONOCYTES 9 4.0 - 12.0 %    EOSINOPHILS 1 0.5 - 7.8 %    BASOPHILS 0 0.0 - 2.0 %    IMMATURE GRANULOCYTES 2 0.0 - 5.0 %    ABS. NEUTROPHILS 5.9 1.7 - 8.2 K/UL    ABS. LYMPHOCYTES 2.8 0.5 - 4.6 K/UL    ABS. MONOCYTES 0.9 0.1 - 1.3 K/UL    ABS. EOSINOPHILS 0.1 0.0 - 0.8 K/UL    ABS. BASOPHILS 0.0 0.0 - 0.2 K/UL    ABS. IMM.  GRANS. 0.2 0.0 - 0.5 K/UL   GLUCOSE, POC    Collection Time: 06/18/21 11:05 AM   Result Value Ref Range    Glucose (POC) 173 (H) 65 - 100 mg/dL    Performed by Shama Merchant        All Micro Results     Procedure Component Value Units Date/Time    CULTURE, RESPIRATORY/SPUTUM/BRONCH Renford Dusky STAIN [[id number]]  (Abnormal) Collected: 06/14/21 1520    Order Status: Completed Specimen: Sputum from Bronchial Washing Updated: 06/18/21 0726     Special Requests: NO SPECIAL REQUESTS        GRAM STAIN 10 TO 45 WBC'S/OIF      NO EPITHELIAL CELLS SEEN         FEW GRAM POSITIVE COCCI         FEW YEAST        Culture result: MODERATE EVY ALBICANS               MODERATE NORMAL RESPIRATORY DEUCE          CULTURE, BLOOD [[id number]] Collected: 06/10/21 1825    Order Status: Completed Specimen: Blood Updated: 06/15/21 0719     Special Requests: RAC     Culture result: NO GROWTH 5 DAYS CULTURE, BLOOD [[id number]] Collected: 06/10/21 1825    Order Status: Completed Specimen: Blood Updated: 06/15/21 0719     Special Requests: LAC     Culture result: NO GROWTH 5 DAYS       CULTURE, RESPIRATORY/SPUTUM/BRONCH Faith Ledezmaing [[id number]] Collected: 06/11/21 0930    Order Status: Canceled Specimen: Sputum           SARS-CoV-2 Lab Results  \"Novel Coronavirus\" Test: No results found for: COV2NT   \"Emergent Disease\" Test: No results found for: EDPR  \"SARS-COV-2\" Test: No results found for: XGCOVT  Rapid Test: No results found for: COVR         Diagnostic Imaging/Tests:   XR CHEST PORT    Result Date: 6/10/2021  Portable chest x-ray CLINICAL INDICATION: Syncope FINDINGS: Single AP view of the chest submitted without comparison show the lungs to be expanded and clear. No pleural effusion or pneumothorax. The cardiac silhouette and mediastinum are unremarkable. The bones are normal.     Unremarkable portable chest x-ray. No acute abnormality evident. ECHO ADULT COMPLETE    Result Date: 6/15/2021  · LV: Estimated LVEF is 40 - 45%. Normal cavity size. Moderate concentric hypertrophy. Left ventricular diastolic dysfunction. · Echo study was technically difficulty. · Contrast used: DEFINITY. · LA: Mildly dilated left atrium. CT CHEST PULMONARY EMBOLISM    Result Date: 6/10/2021  CT OF THE CHEST WITH CONTRAST, PULMONARY EMBOLUS PROTOCOL. CLINICAL INDICATION: Shortness of breath for two days; productive cough, hypoxia PROCEDURE: Serial thin section axial images are obtained from the thoracic inlet through the upper abdomen following the administration of intravenous contrast per a dedicated, institutional pulmonary embolus protocol. Coronal MIP reformatted images are generated. Radiation dose reduction techniques were used for this study.  Our CT scanners use one or all of the following: Automated exposure control, adjusted of the mA and/or kV according to patient size, iterative reconstruction COMPARISON: No prior FINDINGS: There is adequate opacification of the central pulmonary arterial tree. No central intraluminal filling defect noted to indicate acute pulmonary embolus. No mediastinal or axillary adenopathy evident. There is no pneumothorax. No airspace consolidation noted. There is no pleural or pericardial effusion. There is no mediastinal or axillary adenopathy. There is a defect in the posterior wall of the trachea in the upper mediastinum at the thoracic inlet with extension of air into the paratracheal soft tissues in keeping with a small pneumomediastinum. There is obstruction of the lobar bronchi to the bilateral lower lobes with intrabronchial substance presumably mucus impaction Limited evaluation of the upper abdomen shows a nodular morphology to the liver. Cirrhosis could be considered. No aggressive osseous lesions identified. 1. No acute pulmonary embolus. 2. Defect or rupture of the posterior tracheal wall near the thoracic inlet with air extending into the upper mediastinal soft tissues indicative of pneumomediastinum. 3. No acute abnormality noted in the lungs. 4. Nodular morphology to the liver can be seen with cirrhosis. 5. Obstruction of the bilateral lower lobar bronchi likely mucus impaction. Aspirated fluid is not excluded. Echocardiogram/EKG results:  No results found for this visit on 06/10/21.     EKG Results     Procedure 720 Value Units Date/Time    EKG, 12 LEAD, SUBSEQUENT [[id number]] Collected: 06/15/21 0129    Order Status: Completed Updated: 06/15/21 0725     Ventricular Rate 78 BPM      Atrial Rate 78 BPM      P-R Interval 190 ms      QRS Duration 146 ms      Q-T Interval 448 ms      QTC Calculation (Bezet) 510 ms      Calculated P Axis 86 degrees      Calculated R Axis -108 degrees      Calculated T Axis 77 degrees      Diagnosis --     Sinus rhythm with Premature atrial complexes  Non-specific intra-ventricular conduction block  Possible Lateral infarct (cited on or before 10-NICHOLE-2021)  Abnormal ECG  When compared with ECG of 10-NICHOLE-2021 19:54,  Premature ventricular complexes are no longer Present  Premature atrial complexes are now Present  Criteria for Inferior infarct are no longer Present  Serial changes of Lateral infarct Present  Confirmed by Sterling Harvey MD ()ELLIS ([id number]) on 6/15/2021 7:25:48 AM      EKG, 12 LEAD, INITIAL [[id number]] Collected: 06/10/21 1954    Order Status: Completed Updated: 06/10/21 2052     Ventricular Rate 96 BPM      Atrial Rate 96 BPM      P-R Interval 196 ms      QRS Duration 150 ms      Q-T Interval 410 ms      QTC Calculation (Bezet) 517 ms      Calculated P Axis 86 degrees      Calculated R Axis -54 degrees      Calculated T Axis 105 degrees      Diagnosis --       Sinus rhythm with sinus arrhythmia with occasional Premature ventricular   complexes  Biatrial enlargement  Non-specific intra-ventricular conduction block  Abnormal ECG  When compared with ECG of 10-NICHOLE-2021 17:32,  Premature supraventricular complexes are no longer Present  Serial changes of Lateral infarct Present  Confirmed by Nikole Santos ([id number]) on 6/10/2021 8:52:09 PM      EKG [[id number]] Collected: 06/10/21 1732    Order Status: Completed Updated: 06/10/21 1807     Ventricular Rate 89 BPM      Atrial Rate 89 BPM      P-R Interval 198 ms      QRS Duration 152 ms      Q-T Interval 450 ms      QTC Calculation (Bezet) 547 ms      Calculated P Axis 82 degrees      Calculated R Axis -64 degrees      Calculated T Axis 101 degrees      Diagnosis --     !! AGE AND GENDER SPECIFIC ECG ANALYSIS !!   Sinus rhythm with Premature supraventricular complexes with occasional   Premature ventricular complexes  Biatrial enlargement  Non-specific intra-ventricular conduction block  anterior infarct, age indeterminant  Abnormal ECG  No previous ECGs available  Confirmed by Sterling Harvey MD ()ELLIS ([id number]) on 6/10/2021 6:07:12 PM            Results for orders placed or performed during the hospital encounter of 06/10/21   EKG, 12 LEAD, INITIAL   Result Value Ref Range    Ventricular Rate 96 BPM    Atrial Rate 96 BPM    P-R Interval 196 ms    QRS Duration 150 ms    Q-T Interval 410 ms    QTC Calculation (Bezet) 517 ms    Calculated P Axis 86 degrees    Calculated R Axis -54 degrees    Calculated T Axis 105 degrees    Diagnosis         Sinus rhythm with sinus arrhythmia with occasional Premature ventricular   complexes  Biatrial enlargement  Non-specific intra-ventricular conduction block  Abnormal ECG  When compared with ECG of 10-NICHOLE-2021 17:32,  Premature supraventricular complexes are no longer Present  Serial changes of Lateral infarct Present  Confirmed by Melissa Wood ([id number]) on 6/10/2021 8:52:09 PM         Procedures done this admission:  Procedure(s):  BRONCHOSCOPY **MD REQUEST NO INTUBATION**   BRONCHIAL TREE FOREIGN BODY REMOVAL        Time spent in patient discharge planning and coordination 40 minutes.       Signed By: Lane Stern MD   [email or web address]    June 18, 2021  12:38 PM

## 2021-06-18 NOTE — PROGRESS NOTES
IV bloody and leaking. New #22 gelco placed in right wrist area by Xander Bazan RN. Old #22 gelco removed, tip cath intact. Will monitor.

## 2021-06-18 NOTE — PROGRESS NOTES
Assumed care of patient. Assessment completed and documented, see docflow. Patient esting quietly in bed. NAD noted at present. Respirations even and non labored on RA. Girlfriend at bedside, encouraged to call for needs. Call light within reach. Will continue to monitor.

## 2021-06-18 NOTE — PROGRESS NOTES
MSN CM:  Patient to have stent placement today at 14:00. Patient may be discharge home with [facility] when medically stable. Not certain of discharge needs at this time. Case Management will continue to follow.

## 2021-06-18 NOTE — PROGRESS NOTES
Called GI lab, spoke with Darylene Munch, RN; informed patient left AMA and stent placement will not be completed today.

## 2021-06-18 NOTE — PROGRESS NOTES
Report received from Mary Solis RN. No distress. On remote telemetry at present. Running NSR. Will monitor.

## (undated) DEVICE — MOUTHPIECE ENDOSCP 20X27MM --

## (undated) DEVICE — 60 ML SYRINGE REGULAR TIP: Brand: MONOJECT

## (undated) DEVICE — SINGLE USE SUCTION VALVE MAJ-209: Brand: SINGLE USE SUCTION VALVE (STERILE)

## (undated) DEVICE — FORCEPS BX L115CM ALGTR JAW STP DISP

## (undated) DEVICE — SINGLE-USE BIOPSY FORCEPS: Brand: RADIAL JAW 4

## (undated) DEVICE — SIZER SURG NOVEL DSGN LEN DIAM MEAS ACCURACY LO COST

## (undated) DEVICE — KENDALL RADIOLUCENT FOAM MONITORING ELECTRODE RECTANGULAR SHAPE: Brand: KENDALL

## (undated) DEVICE — BRONCHOSCOPY PACK: Brand: MEDLINE INDUSTRIES, INC.

## (undated) DEVICE — SINGLE USE BIOPSY VALVE MAJ-210: Brand: SINGLE USE BIOPSY VALVE (STERILE)